# Patient Record
Sex: MALE | Race: WHITE | NOT HISPANIC OR LATINO | Employment: OTHER | URBAN - METROPOLITAN AREA
[De-identification: names, ages, dates, MRNs, and addresses within clinical notes are randomized per-mention and may not be internally consistent; named-entity substitution may affect disease eponyms.]

---

## 2017-10-10 ENCOUNTER — HOSPITAL ENCOUNTER (EMERGENCY)
Facility: HOSPITAL | Age: 77
Discharge: HOME | End: 2017-10-11
Attending: EMERGENCY MEDICINE | Admitting: NURSE PRACTITIONER

## 2017-10-10 DIAGNOSIS — R53.1 SPELL OF GENERALIZED WEAKNESS: Primary | ICD-10-CM

## 2017-10-10 PROCEDURE — 96360 HYDRATION IV INFUSION INIT: CPT

## 2017-10-10 PROCEDURE — 99283 EMERGENCY DEPT VISIT LOW MDM: CPT

## 2017-10-10 PROCEDURE — 85025 COMPLETE CBC W/AUTO DIFF WBC: CPT | Performed by: NURSE PRACTITIONER

## 2017-10-10 PROCEDURE — 80053 COMPREHEN METABOLIC PANEL: CPT | Performed by: NURSE PRACTITIONER

## 2017-10-10 RX ORDER — SODIUM CHLORIDE 0.9 % (FLUSH) 0.9 %
10 SYRINGE (ML) INJECTION AS NEEDED
Status: DISCONTINUED | OUTPATIENT
Start: 2017-10-10 | End: 2017-10-11 | Stop reason: HOSPADM

## 2017-10-10 RX ORDER — EZETIMIBE 10 MG/1
10 TABLET ORAL DAILY
COMMUNITY

## 2017-10-10 RX ORDER — ALLOPURINOL 100 MG/1
300 TABLET ORAL DAILY
COMMUNITY

## 2017-10-10 RX ORDER — ASPIRIN 81 MG/1
81 TABLET ORAL DAILY
Status: ON HOLD | COMMUNITY
End: 2021-01-01 | Stop reason: ALTCHOICE

## 2017-10-10 RX ORDER — TRIAMCINOLONE ACETONIDE 55 UG/1
2 SPRAY, METERED NASAL DAILY
Status: ON HOLD | COMMUNITY
End: 2021-01-01

## 2017-10-10 RX ADMIN — SODIUM CHLORIDE 1000 ML: 9 INJECTION, SOLUTION INTRAVENOUS at 23:56

## 2017-10-11 VITALS
HEIGHT: 68 IN | HEART RATE: 89 BPM | TEMPERATURE: 98.1 F | OXYGEN SATURATION: 93 % | DIASTOLIC BLOOD PRESSURE: 70 MMHG | WEIGHT: 151 LBS | RESPIRATION RATE: 16 BRPM | SYSTOLIC BLOOD PRESSURE: 162 MMHG | BODY MASS INDEX: 22.88 KG/M2

## 2017-10-11 LAB
ALBUMIN SERPL-MCNC: 4.1 G/DL (ref 3.5–5.2)
ALBUMIN/GLOB SERPL: 1.5 G/DL
ALP SERPL-CCNC: 129 U/L (ref 39–117)
ALT SERPL W P-5'-P-CCNC: 20 U/L (ref 1–41)
ANION GAP SERPL CALCULATED.3IONS-SCNC: 19.6 MMOL/L
AST SERPL-CCNC: 25 U/L (ref 1–40)
BASOPHILS # BLD AUTO: 0.02 10*3/MM3 (ref 0–0.2)
BASOPHILS NFR BLD AUTO: 0.2 % (ref 0–1.5)
BILIRUB SERPL-MCNC: 0.3 MG/DL (ref 0.1–1.2)
BUN BLD-MCNC: 24 MG/DL (ref 8–23)
BUN/CREAT SERPL: 20.5 (ref 7–25)
CALCIUM SPEC-SCNC: 9.6 MG/DL (ref 8.6–10.5)
CHLORIDE SERPL-SCNC: 100 MMOL/L (ref 98–107)
CO2 SERPL-SCNC: 20.4 MMOL/L (ref 22–29)
CREAT BLD-MCNC: 1.17 MG/DL (ref 0.76–1.27)
DEPRECATED RDW RBC AUTO: 50.7 FL (ref 37–54)
EOSINOPHIL # BLD AUTO: 0.23 10*3/MM3 (ref 0–0.7)
EOSINOPHIL NFR BLD AUTO: 2.4 % (ref 0.3–6.2)
ERYTHROCYTE [DISTWIDTH] IN BLOOD BY AUTOMATED COUNT: 14 % (ref 11.5–14.5)
GFR SERPL CREATININE-BSD FRML MDRD: 61 ML/MIN/1.73
GLOBULIN UR ELPH-MCNC: 2.8 GM/DL
GLUCOSE BLD-MCNC: 213 MG/DL (ref 65–99)
HCT VFR BLD AUTO: 45.6 % (ref 40.4–52.2)
HGB BLD-MCNC: 15.4 G/DL (ref 13.7–17.6)
IMM GRANULOCYTES # BLD: 0.04 10*3/MM3 (ref 0–0.03)
IMM GRANULOCYTES NFR BLD: 0.4 % (ref 0–0.5)
LYMPHOCYTES # BLD AUTO: 2.31 10*3/MM3 (ref 0.9–4.8)
LYMPHOCYTES NFR BLD AUTO: 23.9 % (ref 19.6–45.3)
MCH RBC QN AUTO: 33.8 PG (ref 27–32.7)
MCHC RBC AUTO-ENTMCNC: 33.8 G/DL (ref 32.6–36.4)
MCV RBC AUTO: 100 FL (ref 79.8–96.2)
MONOCYTES # BLD AUTO: 0.72 10*3/MM3 (ref 0.2–1.2)
MONOCYTES NFR BLD AUTO: 7.4 % (ref 5–12)
NEUTROPHILS # BLD AUTO: 6.36 10*3/MM3 (ref 1.9–8.1)
NEUTROPHILS NFR BLD AUTO: 65.7 % (ref 42.7–76)
PLATELET # BLD AUTO: 207 10*3/MM3 (ref 140–500)
PMV BLD AUTO: 10.1 FL (ref 6–12)
POTASSIUM BLD-SCNC: 4.2 MMOL/L (ref 3.5–5.2)
PROT SERPL-MCNC: 6.9 G/DL (ref 6–8.5)
RBC # BLD AUTO: 4.56 10*6/MM3 (ref 4.6–6)
SODIUM BLD-SCNC: 140 MMOL/L (ref 136–145)
WBC NRBC COR # BLD: 9.68 10*3/MM3 (ref 4.5–10.7)

## 2017-10-11 PROCEDURE — 96361 HYDRATE IV INFUSION ADD-ON: CPT

## 2017-10-11 NOTE — DISCHARGE INSTRUCTIONS
Continue current home medications  Increase fluids  Change positions slowly  Follow up with pmd in 3-5 days for recheck  Return to er for fever, chills, vomiting, headache, dizziness, weakness or any new or worsening symptoms

## 2017-10-11 NOTE — ED PROVIDER NOTES
Discussed pt's case pita Bridges.  Pt presents to ER c/o of bilateral leg weakness that occurred after Pt consumed 4 double bourbons. Pt states he went to use the restroom when his legs went out from him. Pt denies LOC or any injuries obtained from this episode of leg weakness. Pt also denies CP,SOB, or any other pertinent symptoms. Pt states he feels improved upon evaluation. Pt states he only has mild weakness in the legs currently. Plan to administer IVF and perform labs for further evaluation. On exam, Pt is resting comfortably, in no distress, and without focal neurologic deficit. Abdomen is soft and non-tender, cardio-vascular normal with no murmur, and moves all extremities appropriately.     I supervised care provided by the midlevel provider.    We have discussed this patient's history, physical exam, and treatment plan.   I have reviewed the note and personally saw and examined the patient and agree with the plan of care.    Documentation assistance provided by nicolasa Sharma for Dr. Yoder.  Information recorded by the scribe was done at my direction and has been verified and validated by me.       Rafiq Sharma  10/11/17 0013       George Yoder MD  10/14/17 9063

## 2017-10-11 NOTE — ED NOTES
Patient was drinking tonight (4 glasses bourbon), states he was going to the bathroom and felt like his legs were very weak and then fell back down onto the toilet.      Jocy Magaña RN  10/10/17 8873

## 2017-10-11 NOTE — ED PROVIDER NOTES
EMERGENCY DEPARTMENT ENCOUNTER    CHIEF COMPLAINT  Chief Complaint: Leg weakness   History given by:patient   History limited by:n/a  Room Number: 15/15  PMD: Mal Barboza MD      HPI:  Pt is a 77 y.o. male who presents with BLE weakness. Pt states that he was celebrating his birthday tonight and had about 4 double shots of bourbon. Pt states that he went to the bathroom, and then felt that he had no strength in his BLE, and collapsed onto the toilet. Pt denies LOC. Pt states that he was able to pull himself into a standing position and make it up 1 flight of stairs before he fell unable to go any farther. He denies chest pain, SOA, headache, numbness, or any other sx. Currently, pt states that the weakness in the BLE has improved. Past Medical History of diabetes, hypertension, and hyperlipidemia.     Duration: prior to arrival   Timing:constant   Location:BLE   Radiation:none  Quality:weakness   Intensity/Severity:moderate  Progression:improved   Associated Symptoms:none  Aggravating Factors:none  Alleviating Factors:none  Previous Episodes:none    PAST MEDICAL HISTORY  Active Ambulatory Problems     Diagnosis Date Noted   • No Active Ambulatory Problems     Resolved Ambulatory Problems     Diagnosis Date Noted   • No Resolved Ambulatory Problems     Past Medical History:   Diagnosis Date   • Diabetes mellitus    • Gout    • Hyperlipidemia    • Hypertension        PAST SURGICAL HISTORY  History reviewed. No pertinent surgical history.    FAMILY HISTORY  History reviewed. No pertinent family history.    SOCIAL HISTORY  Social History     Social History   • Marital status:      Spouse name: N/A   • Number of children: N/A   • Years of education: N/A     Occupational History   • Not on file.     Social History Main Topics   • Smoking status: Former Smoker   • Smokeless tobacco: Not on file   • Alcohol use Yes   • Drug use: Not on file   • Sexual activity: Not on file     Other Topics Concern   • Not on  file     Social History Narrative   • No narrative on file         ALLERGIES  Review of patient's allergies indicates no known allergies.    REVIEW OF SYSTEMS  Review of Systems   Constitutional: Negative for chills and fever.   HENT: Negative for sore throat.    Respiratory: Negative for shortness of breath.    Cardiovascular: Negative for chest pain.   Gastrointestinal: Negative for nausea and vomiting.   Genitourinary: Negative for dysuria.   Musculoskeletal: Negative for back pain.   Skin: Negative for rash.   Neurological: Positive for weakness (BLE). Negative for dizziness.   Psychiatric/Behavioral: The patient is not nervous/anxious.        PHYSICAL EXAM  ED Triage Vitals   Temp Heart Rate Resp BP SpO2   10/10/17 2322 10/10/17 2316 10/10/17 2316 10/10/17 2316 10/10/17 2316   97.5 °F (36.4 °C) 78 18 161/84 96 %     Physical Exam   Constitutional: He is oriented to person, place, and time and well-developed, well-nourished, and in no distress. No distress.   HENT:   Head: Atraumatic.   Mouth/Throat: Mucous membranes are normal.   Eyes: Conjunctivae and EOM are normal. Pupils are equal, round, and reactive to light. No scleral icterus.   Neck: Normal range of motion.   Cardiovascular: Normal rate, regular rhythm and normal heart sounds.    Pulmonary/Chest: Effort normal and breath sounds normal.   Musculoskeletal: Normal range of motion.   Neurological: He is alert and oriented to person, place, and time. He has normal motor skills and normal strength. He has a normal Finger-Nose-Finger Test.   Skin: Skin is warm and dry.   Psychiatric: Mood and affect normal.   Nursing note and vitals reviewed.      LAB RESULTS  Recent Results (from the past 24 hour(s))   Comprehensive Metabolic Panel    Collection Time: 10/10/17 11:56 PM   Result Value Ref Range    Glucose 213 (H) 65 - 99 mg/dL    BUN 24 (H) 8 - 23 mg/dL    Creatinine 1.17 0.76 - 1.27 mg/dL    Sodium 140 136 - 145 mmol/L    Potassium 4.2 3.5 - 5.2 mmol/L     Chloride 100 98 - 107 mmol/L    CO2 20.4 (L) 22.0 - 29.0 mmol/L    Calcium 9.6 8.6 - 10.5 mg/dL    Total Protein 6.9 6.0 - 8.5 g/dL    Albumin 4.10 3.50 - 5.20 g/dL    ALT (SGPT) 20 1 - 41 U/L    AST (SGOT) 25 1 - 40 U/L    Alkaline Phosphatase 129 (H) 39 - 117 U/L    Total Bilirubin 0.3 0.1 - 1.2 mg/dL    eGFR Non African Amer 61 >60 mL/min/1.73    Globulin 2.8 gm/dL    A/G Ratio 1.5 g/dL    BUN/Creatinine Ratio 20.5 7.0 - 25.0    Anion Gap 19.6 mmol/L   CBC Auto Differential    Collection Time: 10/10/17 11:56 PM   Result Value Ref Range    WBC 9.68 4.50 - 10.70 10*3/mm3    RBC 4.56 (L) 4.60 - 6.00 10*6/mm3    Hemoglobin 15.4 13.7 - 17.6 g/dL    Hematocrit 45.6 40.4 - 52.2 %    .0 (H) 79.8 - 96.2 fL    MCH 33.8 (H) 27.0 - 32.7 pg    MCHC 33.8 32.6 - 36.4 g/dL    RDW 14.0 11.5 - 14.5 %    RDW-SD 50.7 37.0 - 54.0 fl    MPV 10.1 6.0 - 12.0 fL    Platelets 207 140 - 500 10*3/mm3    Neutrophil % 65.7 42.7 - 76.0 %    Lymphocyte % 23.9 19.6 - 45.3 %    Monocyte % 7.4 5.0 - 12.0 %    Eosinophil % 2.4 0.3 - 6.2 %    Basophil % 0.2 0.0 - 1.5 %    Immature Grans % 0.4 0.0 - 0.5 %    Neutrophils, Absolute 6.36 1.90 - 8.10 10*3/mm3    Lymphocytes, Absolute 2.31 0.90 - 4.80 10*3/mm3    Monocytes, Absolute 0.72 0.20 - 1.20 10*3/mm3    Eosinophils, Absolute 0.23 0.00 - 0.70 10*3/mm3    Basophils, Absolute 0.02 0.00 - 0.20 10*3/mm3    Immature Grans, Absolute 0.04 (H) 0.00 - 0.03 10*3/mm3       I ordered the above labs and reviewed the results    PROGRESS AND CONSULTS    23:46  Pt able to stand and ambulate, though does still complain of weakness. Plan for labs and IVF. Pt understands and agrees with the plan, all questions answered.    23:50  Reviewed pt's history and workup with Dr. Yoder.  At bedside evaluation, they agree with the plan of care.    01:50  BP- 161/84 HR- 78 Temp- 97.5 °F (36.4 °C) (Tympanic) O2 sat- 96%  Rechecked the patient who is in NAD and is resting comfortably. Advised pt and family that the labs  "show NAD. Sx may be attributed to EtOH use. Pt able to stand and ambulate at bedside. Pt will be discharged. Pt understands and agrees with the plan, all questions answered.    01:53  Reviewed implications of results, diagnosis, meds, responsibility to follow up, warning signs and symptoms of possible worsening, potential complications and reasons to return to ER with patient.  Discussed all results and noted any abnormalities with patient.  Discussed absolute need to recheck abnormalities with PMD    Discussed plan for discharge, as there is no emergent indication for admission.  Pt is agreeable and understands need for follow up and repeat testing.  Pt is aware that discharge does not mean that nothing is wrong but it indicates no emergency is present.  Pt is discharged with instructions to follow up with primary care doctor to have their blood pressure rechecked.       DIAGNOSIS  Final diagnoses:   Spell of generalized weakness       FOLLOW UP   Mal Barboza MD  [address] [address]  [phone]            OURSE & MEDICAL DECISION MAKING  Pertinent Labs that were ordered and reviewed are noted above.  Results were reviewed/discussed with the patient and they were also made aware of online assess.     MEDICATIONS GIVEN IN ER  Medications   sodium chloride 0.9 % flush 10 mL (not administered)   sodium chloride 0.9 % bolus 1,000 mL (1,000 mL Intravenous New Bag 10/10/17 2356)       /84  Pulse 78  Temp 97.5 °F (36.4 °C) (Tympanic)   Resp 18  Ht 68\" (172.7 cm)  Wt 151 lb (68.5 kg)  SpO2 96%  BMI 22.96 kg/m2      I personally reviewed the past medical history, past surgical history, social history, family history, current medications and allergies as they appear in this chart.  The scribe's note accurately reflects the work and decisions made by me.     Documentation assistance provided by nicolasa Sigala for VIKKI Peralta on 10/10/2017 at 11:39 PM. " Information recorded by the scribe was done at my direction and has been verified and validated by me.        Jessika Sigala  10/11/17 0154       Kaylyn Eden, APRN  10/12/17 [id number]

## 2017-10-13 ENCOUNTER — TRANSCRIBE ORDERS (OUTPATIENT)
Dept: ADMINISTRATIVE | Facility: HOSPITAL | Age: 77
End: 2017-10-13

## 2017-10-13 DIAGNOSIS — I25.10 ASCVD (ARTERIOSCLEROTIC CARDIOVASCULAR DISEASE): ICD-10-CM

## 2017-10-13 DIAGNOSIS — R55 SYNCOPE, UNSPECIFIED SYNCOPE TYPE: Primary | ICD-10-CM

## 2017-10-18 ENCOUNTER — HOSPITAL ENCOUNTER (OUTPATIENT)
Dept: CARDIOLOGY | Facility: HOSPITAL | Age: 77
Discharge: HOME | End: 2017-10-18
Attending: INTERNAL MEDICINE | Admitting: INTERNAL MEDICINE

## 2017-10-18 DIAGNOSIS — I25.10 ASCVD (ARTERIOSCLEROTIC CARDIOVASCULAR DISEASE): ICD-10-CM

## 2017-10-18 DIAGNOSIS — R55 SYNCOPE, UNSPECIFIED SYNCOPE TYPE: ICD-10-CM

## 2017-10-18 LAB
BH CV NUCLEAR PRIOR STUDY: 2
BH CV STRESS BP STAGE 1: NORMAL
BH CV STRESS BP STAGE 2: NORMAL
BH CV STRESS DURATION MIN STAGE 1: 3
BH CV STRESS DURATION MIN STAGE 2: 4
BH CV STRESS DURATION SEC STAGE 1: 0
BH CV STRESS DURATION SEC STAGE 2: 0
BH CV STRESS GRADE STAGE 1: 10
BH CV STRESS GRADE STAGE 2: 12
BH CV STRESS HR STAGE 1: 101
BH CV STRESS HR STAGE 2: 130
BH CV STRESS METS STAGE 1: 5
BH CV STRESS METS STAGE 2: 7.5
BH CV STRESS PROTOCOL 1: NORMAL
BH CV STRESS RECOVERY BP: NORMAL MMHG
BH CV STRESS RECOVERY HR: 84 BPM
BH CV STRESS SPEED STAGE 1: 1.7
BH CV STRESS SPEED STAGE 2: 2.5
BH CV STRESS STAGE 1: 1
BH CV STRESS STAGE 2: 2
LV EF NUC BP: 56 %
MAXIMAL PREDICTED HEART RATE: 145 BPM
PERCENT MAX PREDICTED HR: 89.66 %
STRESS BASELINE BP: NORMAL MMHG
STRESS BASELINE HR: 69 BPM
STRESS PERCENT HR: 105 %
STRESS POST ESTIMATED WORKLOAD: 7.5 METS
STRESS POST EXERCISE DUR MIN: 7 MIN
STRESS POST EXERCISE DUR SEC: 0 SEC
STRESS POST PEAK BP: NORMAL MMHG
STRESS POST PEAK HR: 130 BPM
STRESS TARGET HR: 123 BPM

## 2017-10-18 PROCEDURE — 0 TECHNETIUM TETROFOSMIN KIT: Performed by: INTERNAL MEDICINE

## 2017-10-18 PROCEDURE — 93018 CV STRESS TEST I&R ONLY: CPT | Performed by: INTERNAL MEDICINE

## 2017-10-18 PROCEDURE — 78452 HT MUSCLE IMAGE SPECT MULT: CPT

## 2017-10-18 PROCEDURE — 93016 CV STRESS TEST SUPVJ ONLY: CPT | Performed by: INTERNAL MEDICINE

## 2017-10-18 PROCEDURE — 93017 CV STRESS TEST TRACING ONLY: CPT

## 2017-10-18 PROCEDURE — 78452 HT MUSCLE IMAGE SPECT MULT: CPT | Performed by: INTERNAL MEDICINE

## 2017-10-18 PROCEDURE — A9502 TC99M TETROFOSMIN: HCPCS | Performed by: INTERNAL MEDICINE

## 2017-10-18 RX ADMIN — TETROFOSMIN 1 DOSE: 1.38 INJECTION, POWDER, LYOPHILIZED, FOR SOLUTION INTRAVENOUS at 08:04

## 2017-10-18 RX ADMIN — TETROFOSMIN 1 DOSE: 1.38 INJECTION, POWDER, LYOPHILIZED, FOR SOLUTION INTRAVENOUS at 09:03

## 2017-11-22 ENCOUNTER — TRANSCRIBE ORDERS (OUTPATIENT)
Dept: DIABETES SERVICES | Facility: HOSPITAL | Age: 77
End: 2017-11-22

## 2017-11-22 DIAGNOSIS — E11.9 DIABETES MELLITUS WITHOUT COMPLICATION (HCC): Primary | ICD-10-CM

## 2017-12-05 ENCOUNTER — HOSPITAL ENCOUNTER (OUTPATIENT)
Dept: DIABETES SERVICES | Facility: HOSPITAL | Age: 77
Setting detail: RECURRING SERIES
Discharge: HOME | End: 2017-12-05
Attending: INTERNAL MEDICINE

## 2017-12-05 PROCEDURE — G0109 DIAB MANAGE TRN IND/GROUP: HCPCS

## 2017-12-12 ENCOUNTER — HOSPITAL ENCOUNTER (OUTPATIENT)
Dept: DIABETES SERVICES | Facility: HOSPITAL | Age: 77
Setting detail: RECURRING SERIES
Discharge: HOME | End: 2017-12-12
Attending: INTERNAL MEDICINE

## 2017-12-12 PROCEDURE — G0109 DIAB MANAGE TRN IND/GROUP: HCPCS

## 2017-12-19 ENCOUNTER — HOSPITAL ENCOUNTER (OUTPATIENT)
Dept: DIABETES SERVICES | Facility: HOSPITAL | Age: 77
Setting detail: RECURRING SERIES
Discharge: HOME | End: 2017-12-19
Attending: INTERNAL MEDICINE

## 2017-12-19 PROCEDURE — G0109 DIAB MANAGE TRN IND/GROUP: HCPCS

## 2018-11-28 ENCOUNTER — TRANSCRIBE ORDERS (OUTPATIENT)
Dept: ADMINISTRATIVE | Facility: HOSPITAL | Age: 78
End: 2018-11-28

## 2018-11-28 DIAGNOSIS — I38 VALVULAR DISEASE: Primary | ICD-10-CM

## 2018-11-30 ENCOUNTER — HOSPITAL ENCOUNTER (OUTPATIENT)
Dept: CARDIOLOGY | Facility: HOSPITAL | Age: 78
Discharge: HOME | End: 2018-11-30
Attending: INTERNAL MEDICINE | Admitting: INTERNAL MEDICINE

## 2018-11-30 VITALS
HEIGHT: 68 IN | WEIGHT: 151 LBS | DIASTOLIC BLOOD PRESSURE: 47 MMHG | BODY MASS INDEX: 22.88 KG/M2 | SYSTOLIC BLOOD PRESSURE: 160 MMHG | HEART RATE: 71 BPM

## 2018-11-30 DIAGNOSIS — I38 VALVULAR DISEASE: ICD-10-CM

## 2018-11-30 LAB
AORTIC DIMENSIONLESS INDEX: 0.6 (DI)
BH CV ECHO MEAS - ACS: 2 CM
BH CV ECHO MEAS - AO MAX PG: 7 MMHG
BH CV ECHO MEAS - AO MEAN PG (FULL): 3 MMHG
BH CV ECHO MEAS - AO MEAN PG: 4 MMHG
BH CV ECHO MEAS - AO ROOT AREA (BSA CORRECTED): 1.8
BH CV ECHO MEAS - AO ROOT AREA: 8.6 CM^2
BH CV ECHO MEAS - AO ROOT DIAM: 3.3 CM
BH CV ECHO MEAS - AO V2 MAX: 134 CM/SEC
BH CV ECHO MEAS - AO V2 MEAN: 88.7 CM/SEC
BH CV ECHO MEAS - AO V2 VTI: 25.8 CM
BH CV ECHO MEAS - AVA(I,A): 2.1 CM^2
BH CV ECHO MEAS - AVA(I,D): 2.1 CM^2
BH CV ECHO MEAS - BSA(HAYCOCK): 1.8 M^2
BH CV ECHO MEAS - BSA: 1.8 M^2
BH CV ECHO MEAS - BZI_BMI: 23 KILOGRAMS/M^2
BH CV ECHO MEAS - BZI_METRIC_HEIGHT: 172.7 CM
BH CV ECHO MEAS - BZI_METRIC_WEIGHT: 68.5 KG
BH CV ECHO MEAS - EDV(CUBED): 97.3 ML
BH CV ECHO MEAS - EDV(MOD-SP2): 64 ML
BH CV ECHO MEAS - EDV(MOD-SP4): 55 ML
BH CV ECHO MEAS - EDV(TEICH): 97.3 ML
BH CV ECHO MEAS - EF(CUBED): 69.4 %
BH CV ECHO MEAS - EF(MOD-BP): 59 %
BH CV ECHO MEAS - EF(MOD-SP2): 53.1 %
BH CV ECHO MEAS - EF(MOD-SP4): 63.6 %
BH CV ECHO MEAS - EF(TEICH): 61 %
BH CV ECHO MEAS - ESV(CUBED): 29.8 ML
BH CV ECHO MEAS - ESV(MOD-SP2): 30 ML
BH CV ECHO MEAS - ESV(MOD-SP4): 20 ML
BH CV ECHO MEAS - ESV(TEICH): 37.9 ML
BH CV ECHO MEAS - FS: 32.6 %
BH CV ECHO MEAS - IVS/LVPW: 0.82
BH CV ECHO MEAS - IVSD: 0.9 CM
BH CV ECHO MEAS - LA DIMENSION: 4.4 CM
BH CV ECHO MEAS - LA/AO: 1.3
BH CV ECHO MEAS - LAT PEAK E' VEL: 8 CM/SEC
BH CV ECHO MEAS - LV DIASTOLIC VOL/BSA (35-75): 30.3 ML/M^2
BH CV ECHO MEAS - LV MASS(C)D: 158.8 GRAMS
BH CV ECHO MEAS - LV MASS(C)DI: 87.6 GRAMS/M^2
BH CV ECHO MEAS - LV MEAN PG: 1 MMHG
BH CV ECHO MEAS - LV SYSTOLIC VOL/BSA (12-30): 11 ML/M^2
BH CV ECHO MEAS - LV V1 MAX: 87 CM/SEC
BH CV ECHO MEAS - LV V1 MEAN: 57.2 CM/SEC
BH CV ECHO MEAS - LV V1 VTI: 15.9 CM
BH CV ECHO MEAS - LVIDD: 4.6 CM
BH CV ECHO MEAS - LVIDS: 3.1 CM
BH CV ECHO MEAS - LVLD AP2: 6.7 CM
BH CV ECHO MEAS - LVLD AP4: 7 CM
BH CV ECHO MEAS - LVLS AP2: 5.7 CM
BH CV ECHO MEAS - LVLS AP4: 5.8 CM
BH CV ECHO MEAS - LVOT AREA (M): 3.5 CM^2
BH CV ECHO MEAS - LVOT AREA: 3.5 CM^2
BH CV ECHO MEAS - LVOT DIAM: 2.1 CM
BH CV ECHO MEAS - LVPWD: 1.1 CM
BH CV ECHO MEAS - MED PEAK E' VEL: 10 CM/SEC
BH CV ECHO MEAS - MR MAX PG: 70.9 MMHG
BH CV ECHO MEAS - MR MAX VEL: 421 CM/SEC
BH CV ECHO MEAS - MV A DUR: 0.07 SEC
BH CV ECHO MEAS - MV A MAX VEL: 53.3 CM/SEC
BH CV ECHO MEAS - MV DEC SLOPE: 308 CM/SEC^2
BH CV ECHO MEAS - MV DEC TIME: 0.14 SEC
BH CV ECHO MEAS - MV E MAX VEL: 82.4 CM/SEC
BH CV ECHO MEAS - MV E/A: 1.5
BH CV ECHO MEAS - MV MEAN PG: 1 MMHG
BH CV ECHO MEAS - MV P1/2T MAX VEL: 82.5 CM/SEC
BH CV ECHO MEAS - MV P1/2T: 78.5 MSEC
BH CV ECHO MEAS - MV V2 MEAN: 45.6 CM/SEC
BH CV ECHO MEAS - MV V2 VTI: 25.9 CM
BH CV ECHO MEAS - MVA P1/2T LCG: 2.7 CM^2
BH CV ECHO MEAS - MVA(P1/2T): 2.8 CM^2
BH CV ECHO MEAS - MVA(VTI): 2.1 CM^2
BH CV ECHO MEAS - PA ACC SLOPE: 2258 CM/SEC^2
BH CV ECHO MEAS - PA ACC TIME: 0.06 SEC
BH CV ECHO MEAS - PA MAX PG (FULL): 3.7 MMHG
BH CV ECHO MEAS - PA MAX PG: 6.4 MMHG
BH CV ECHO MEAS - PA PR(ACCEL): 53.8 MMHG
BH CV ECHO MEAS - PA V2 MAX: 126 CM/SEC
BH CV ECHO MEAS - PULM A REVS DUR: 0.1 SEC
BH CV ECHO MEAS - PULM A REVS VEL: 26.8 CM/SEC
BH CV ECHO MEAS - PULM DIAS VEL: 62.4 CM/SEC
BH CV ECHO MEAS - PULM S/D: 1.1
BH CV ECHO MEAS - PULM SYS VEL: 66.2 CM/SEC
BH CV ECHO MEAS - PVA(V,A): 2.9 CM^2
BH CV ECHO MEAS - PVA(V,D): 2.9 CM^2
BH CV ECHO MEAS - QP/QS: 1.6
BH CV ECHO MEAS - RAP SYSTOLE: 8 MMHG
BH CV ECHO MEAS - RV MAX PG: 2.7 MMHG
BH CV ECHO MEAS - RV MEAN PG: 2 MMHG
BH CV ECHO MEAS - RV V1 MAX: 82 CM/SEC
BH CV ECHO MEAS - RV V1 MEAN: 58.3 CM/SEC
BH CV ECHO MEAS - RV V1 VTI: 18.9 CM
BH CV ECHO MEAS - RVOT AREA: 4.5 CM^2
BH CV ECHO MEAS - RVOT DIAM: 2.4 CM
BH CV ECHO MEAS - RVSP: 36 MMHG
BH CV ECHO MEAS - SI(AO): 121.7 ML/M^2
BH CV ECHO MEAS - SI(CUBED): 37.2 ML/M^2
BH CV ECHO MEAS - SI(LVOT): 30.4 ML/M^2
BH CV ECHO MEAS - SI(MOD-SP2): 18.7 ML/M^2
BH CV ECHO MEAS - SI(MOD-SP4): 19.3 ML/M^2
BH CV ECHO MEAS - SI(TEICH): 32.8 ML/M^2
BH CV ECHO MEAS - SV(AO): 220.7 ML
BH CV ECHO MEAS - SV(CUBED): 67.5 ML
BH CV ECHO MEAS - SV(LVOT): 55.1 ML
BH CV ECHO MEAS - SV(MOD-SP2): 34 ML
BH CV ECHO MEAS - SV(MOD-SP4): 35 ML
BH CV ECHO MEAS - SV(RVOT): 85.5 ML
BH CV ECHO MEAS - SV(TEICH): 59.4 ML
BH CV ECHO MEAS - TAPSE (>1.6): 2.5 CM2
BH CV ECHO MEAS - TR MAX VEL: 265 CM/SEC
BH CV ECHO MEASUREMENTS AVERAGE E/E' RATIO: 9.16
BH CV VAS BP LEFT ARM: NORMAL MMHG
BH CV XLRA - RV BASE: 4.2 CM
BH CV XLRA - TDI S': 17.2 CM/SEC
LEFT ATRIUM VOLUME INDEX: 48.1 ML/M2
LV EF 2D ECHO EST: 59 %
MAXIMAL PREDICTED HEART RATE: 144 BPM
STRESS TARGET HR: 122 BPM

## 2018-11-30 PROCEDURE — 25010000002 PERFLUTREN (DEFINITY) 8.476 MG IN SODIUM CHLORIDE 0.9 % 10 ML INJECTION: Performed by: INTERNAL MEDICINE

## 2018-11-30 PROCEDURE — 93306 TTE W/DOPPLER COMPLETE: CPT | Performed by: INTERNAL MEDICINE

## 2018-11-30 PROCEDURE — 93306 TTE W/DOPPLER COMPLETE: CPT

## 2018-11-30 RX ADMIN — SODIUM CHLORIDE 3 ML: 9 INJECTION INTRAMUSCULAR; INTRAVENOUS; SUBCUTANEOUS at 11:10

## 2019-06-26 ENCOUNTER — HOSPITAL ENCOUNTER (OUTPATIENT)
Dept: GENERAL RADIOLOGY | Facility: HOSPITAL | Age: 79
Discharge: HOME | End: 2019-06-26
Admitting: INTERNAL MEDICINE

## 2019-06-26 DIAGNOSIS — R06.02 SHORTNESS OF BREATH: ICD-10-CM

## 2019-06-26 PROCEDURE — 71046 X-RAY EXAM CHEST 2 VIEWS: CPT

## 2019-11-08 ENCOUNTER — APPOINTMENT (OUTPATIENT)
Dept: GENERAL RADIOLOGY | Facility: HOSPITAL | Age: 79
End: 2019-11-08

## 2019-11-08 ENCOUNTER — HOSPITAL ENCOUNTER (EMERGENCY)
Facility: HOSPITAL | Age: 79
Discharge: HOME | End: 2019-11-08
Attending: EMERGENCY MEDICINE | Admitting: EMERGENCY MEDICINE

## 2019-11-08 VITALS
SYSTOLIC BLOOD PRESSURE: 175 MMHG | DIASTOLIC BLOOD PRESSURE: 78 MMHG | TEMPERATURE: 98.4 F | OXYGEN SATURATION: 98 % | HEIGHT: 68 IN | BODY MASS INDEX: 24.25 KG/M2 | RESPIRATION RATE: 16 BRPM | WEIGHT: 160 LBS | HEART RATE: 88 BPM

## 2019-11-08 DIAGNOSIS — S82.831A OTHER CLOSED FRACTURE OF DISTAL END OF RIGHT FIBULA, INITIAL ENCOUNTER: Primary | ICD-10-CM

## 2019-11-08 PROCEDURE — 73610 X-RAY EXAM OF ANKLE: CPT

## 2019-11-08 PROCEDURE — 73630 X-RAY EXAM OF FOOT: CPT

## 2019-11-08 PROCEDURE — 99283 EMERGENCY DEPT VISIT LOW MDM: CPT

## 2019-11-08 RX ORDER — HYDROCODONE BITARTRATE AND ACETAMINOPHEN 5; 325 MG/1; MG/1
1 TABLET ORAL EVERY 6 HOURS PRN
Qty: 20 TABLET | Refills: 0 | Status: SHIPPED | OUTPATIENT
Start: 2019-11-08 | End: 2019-11-19

## 2019-11-08 RX ORDER — PRAVASTATIN SODIUM 40 MG
40 TABLET ORAL DAILY
COMMUNITY

## 2019-11-08 RX ORDER — MONTELUKAST SODIUM 10 MG/1
10 TABLET ORAL NIGHTLY
COMMUNITY

## 2019-11-08 RX ORDER — PIOGLITAZONEHYDROCHLORIDE 15 MG/1
15 TABLET ORAL DAILY
COMMUNITY

## 2019-11-08 NOTE — ED TRIAGE NOTES
Patient presents to er via private vehicle from home. Patient reports a mechanical fall last night injuring his right foot.

## 2019-11-12 ENCOUNTER — ANESTHESIA EVENT (OUTPATIENT)
Dept: PERIOP | Facility: HOSPITAL | Age: 79
End: 2019-11-12

## 2019-11-12 ENCOUNTER — APPOINTMENT (OUTPATIENT)
Dept: GENERAL RADIOLOGY | Facility: HOSPITAL | Age: 79
End: 2019-11-12

## 2019-11-12 ENCOUNTER — HOSPITAL ENCOUNTER (INPATIENT)
Facility: HOSPITAL | Age: 79
LOS: 3 days | Discharge: NURSING, REHAB OR LONG-TERM CARE | End: 2019-11-15
Attending: ORTHOPAEDIC SURGERY | Admitting: ORTHOPAEDIC SURGERY

## 2019-11-12 ENCOUNTER — ANESTHESIA (OUTPATIENT)
Dept: PERIOP | Facility: HOSPITAL | Age: 79
End: 2019-11-12

## 2019-11-12 PROBLEM — S82.891A CLOSED RIGHT ANKLE FRACTURE: Status: ACTIVE | Noted: 2019-11-12

## 2019-11-12 LAB
ABO GROUP BLD: NORMAL
BLD GP AB SCN SERPL QL: NEGATIVE
GLUCOSE BLDC GLUCOMTR-MCNC: 144 MG/DL (ref 70–130)
GLUCOSE BLDC GLUCOMTR-MCNC: 146 MG/DL (ref 70–130)
GLUCOSE BLDC GLUCOMTR-MCNC: 252 MG/DL (ref 70–130)
GLUCOSE BLDC GLUCOMTR-MCNC: 258 MG/DL (ref 70–130)
RH BLD: NEGATIVE
T&S EXPIRATION DATE: NORMAL

## 2019-11-12 PROCEDURE — C1713 ANCHOR/SCREW BN/BN,TIS/BN: HCPCS | Performed by: ORTHOPAEDIC SURGERY

## 2019-11-12 PROCEDURE — 73610 X-RAY EXAM OF ANKLE: CPT

## 2019-11-12 PROCEDURE — 0QSJ04Z REPOSITION RIGHT FIBULA WITH INTERNAL FIXATION DEVICE, OPEN APPROACH: ICD-10-PCS | Performed by: ORTHOPAEDIC SURGERY

## 2019-11-12 PROCEDURE — 25010000002 KETOROLAC TROMETHAMINE PER 15 MG: Performed by: NURSE ANESTHETIST, CERTIFIED REGISTERED

## 2019-11-12 PROCEDURE — 82962 GLUCOSE BLOOD TEST: CPT

## 2019-11-12 PROCEDURE — 73600 X-RAY EXAM OF ANKLE: CPT

## 2019-11-12 PROCEDURE — 25010000003 CEFAZOLIN IN DEXTROSE 2-4 GM/100ML-% SOLUTION: Performed by: NURSE ANESTHETIST, CERTIFIED REGISTERED

## 2019-11-12 PROCEDURE — 86901 BLOOD TYPING SEROLOGIC RH(D): CPT | Performed by: ORTHOPAEDIC SURGERY

## 2019-11-12 PROCEDURE — 97162 PT EVAL MOD COMPLEX 30 MIN: CPT

## 2019-11-12 PROCEDURE — 25010000002 FENTANYL CITRATE (PF) 100 MCG/2ML SOLUTION: Performed by: NURSE ANESTHETIST, CERTIFIED REGISTERED

## 2019-11-12 PROCEDURE — 86850 RBC ANTIBODY SCREEN: CPT | Performed by: ORTHOPAEDIC SURGERY

## 2019-11-12 PROCEDURE — 76000 FLUOROSCOPY <1 HR PHYS/QHP: CPT

## 2019-11-12 PROCEDURE — 86900 BLOOD TYPING SEROLOGIC ABO: CPT | Performed by: ORTHOPAEDIC SURGERY

## 2019-11-12 PROCEDURE — 93010 ELECTROCARDIOGRAM REPORT: CPT | Performed by: INTERNAL MEDICINE

## 2019-11-12 PROCEDURE — L4386 NON-PNEUM WALK BOOT PRE CST: HCPCS | Performed by: ORTHOPAEDIC SURGERY

## 2019-11-12 PROCEDURE — 97110 THERAPEUTIC EXERCISES: CPT

## 2019-11-12 PROCEDURE — 93005 ELECTROCARDIOGRAM TRACING: CPT | Performed by: ORTHOPAEDIC SURGERY

## 2019-11-12 PROCEDURE — 25010000002 DEXAMETHASONE PER 1 MG: Performed by: NURSE ANESTHETIST, CERTIFIED REGISTERED

## 2019-11-12 PROCEDURE — 25010000002 ONDANSETRON PER 1 MG: Performed by: NURSE ANESTHETIST, CERTIFIED REGISTERED

## 2019-11-12 PROCEDURE — 25010000002 PROPOFOL 10 MG/ML EMULSION: Performed by: NURSE ANESTHETIST, CERTIFIED REGISTERED

## 2019-11-12 DEVICE — EVOS 3.5MM X 11MM CORTEX SCREW SELF-TAPPING
Type: IMPLANTABLE DEVICE | Status: FUNCTIONAL
Brand: EVOS

## 2019-11-12 DEVICE — EVOS 3.5 MM LOCKING 1/3 TUBULAR                                    PLATE 7 HOLE 82MM
Type: IMPLANTABLE DEVICE | Status: FUNCTIONAL
Brand: EVOS

## 2019-11-12 DEVICE — EVOS 3.5MM X 13MM LOCKING SCREW SELF-TAPPING
Type: IMPLANTABLE DEVICE | Status: FUNCTIONAL
Brand: EVOS

## 2019-11-12 DEVICE — EVOS 3.5MM X 16MM CORTEX SCREW SELF-TAPPING
Type: IMPLANTABLE DEVICE | Status: FUNCTIONAL
Brand: EVOS

## 2019-11-12 DEVICE — EVOS 3.5MM X 13MM CORTEX SCREW SELF-TAPPING
Type: IMPLANTABLE DEVICE | Status: FUNCTIONAL
Brand: EVOS

## 2019-11-12 DEVICE — EVOS 3.5MM X 10MM CORTEX SCREW SELF-TAPPING
Type: IMPLANTABLE DEVICE | Status: FUNCTIONAL
Brand: EVOS

## 2019-11-12 RX ORDER — MIDAZOLAM HYDROCHLORIDE 1 MG/ML
1 INJECTION INTRAMUSCULAR; INTRAVENOUS
Status: DISCONTINUED | OUTPATIENT
Start: 2019-11-12 | End: 2019-11-12 | Stop reason: HOSPADM

## 2019-11-12 RX ORDER — SODIUM CHLORIDE 0.9 % (FLUSH) 0.9 %
10 SYRINGE (ML) INJECTION AS NEEDED
Status: DISCONTINUED | OUTPATIENT
Start: 2019-11-12 | End: 2019-11-15 | Stop reason: HOSPADM

## 2019-11-12 RX ORDER — ALBUTEROL SULFATE 2.5 MG/3ML
2.5 SOLUTION RESPIRATORY (INHALATION) ONCE AS NEEDED
Status: DISCONTINUED | OUTPATIENT
Start: 2019-11-12 | End: 2019-11-12 | Stop reason: HOSPADM

## 2019-11-12 RX ORDER — HYDRALAZINE HYDROCHLORIDE 20 MG/ML
5 INJECTION INTRAMUSCULAR; INTRAVENOUS
Status: DISCONTINUED | OUTPATIENT
Start: 2019-11-12 | End: 2019-11-12 | Stop reason: HOSPADM

## 2019-11-12 RX ORDER — PRAVASTATIN SODIUM 40 MG
40 TABLET ORAL NIGHTLY
Status: DISCONTINUED | OUTPATIENT
Start: 2019-11-12 | End: 2019-11-15 | Stop reason: HOSPADM

## 2019-11-12 RX ORDER — FAMOTIDINE 10 MG/ML
20 INJECTION, SOLUTION INTRAVENOUS ONCE
Status: COMPLETED | OUTPATIENT
Start: 2019-11-12 | End: 2019-11-12

## 2019-11-12 RX ORDER — UBIDECARENONE 75 MG
250 CAPSULE ORAL DAILY
Status: ON HOLD | COMMUNITY
End: 2021-01-01

## 2019-11-12 RX ORDER — SODIUM CHLORIDE 0.9 % (FLUSH) 0.9 %
10 SYRINGE (ML) INJECTION EVERY 12 HOURS SCHEDULED
Status: DISCONTINUED | OUTPATIENT
Start: 2019-11-12 | End: 2019-11-15 | Stop reason: HOSPADM

## 2019-11-12 RX ORDER — FENTANYL CITRATE 50 UG/ML
INJECTION, SOLUTION INTRAMUSCULAR; INTRAVENOUS AS NEEDED
Status: DISCONTINUED | OUTPATIENT
Start: 2019-11-12 | End: 2019-11-12 | Stop reason: SURG

## 2019-11-12 RX ORDER — FLUMAZENIL 0.1 MG/ML
0.2 INJECTION INTRAVENOUS AS NEEDED
Status: DISCONTINUED | OUTPATIENT
Start: 2019-11-12 | End: 2019-11-12 | Stop reason: HOSPADM

## 2019-11-12 RX ORDER — DIPHENHYDRAMINE HCL 25 MG
25 CAPSULE ORAL
Status: DISCONTINUED | OUTPATIENT
Start: 2019-11-12 | End: 2019-11-12 | Stop reason: HOSPADM

## 2019-11-12 RX ORDER — PROMETHAZINE HYDROCHLORIDE 25 MG/ML
6.25 INJECTION, SOLUTION INTRAMUSCULAR; INTRAVENOUS
Status: DISCONTINUED | OUTPATIENT
Start: 2019-11-12 | End: 2019-11-12 | Stop reason: HOSPADM

## 2019-11-12 RX ORDER — FENTANYL CITRATE 50 UG/ML
50 INJECTION, SOLUTION INTRAMUSCULAR; INTRAVENOUS
Status: DISCONTINUED | OUTPATIENT
Start: 2019-11-12 | End: 2019-11-12 | Stop reason: HOSPADM

## 2019-11-12 RX ORDER — HYDROMORPHONE HYDROCHLORIDE 1 MG/ML
1 INJECTION, SOLUTION INTRAMUSCULAR; INTRAVENOUS; SUBCUTANEOUS
Status: DISCONTINUED | OUTPATIENT
Start: 2019-11-12 | End: 2019-11-15 | Stop reason: HOSPADM

## 2019-11-12 RX ORDER — DOCUSATE SODIUM 100 MG/1
100 CAPSULE, LIQUID FILLED ORAL 2 TIMES DAILY
Status: DISCONTINUED | OUTPATIENT
Start: 2019-11-12 | End: 2019-11-15 | Stop reason: HOSPADM

## 2019-11-12 RX ORDER — EPHEDRINE SULFATE 50 MG/ML
5 INJECTION, SOLUTION INTRAVENOUS ONCE AS NEEDED
Status: DISCONTINUED | OUTPATIENT
Start: 2019-11-12 | End: 2019-11-12 | Stop reason: HOSPADM

## 2019-11-12 RX ORDER — PROMETHAZINE HYDROCHLORIDE 25 MG/ML
12.5 INJECTION, SOLUTION INTRAMUSCULAR; INTRAVENOUS ONCE AS NEEDED
Status: DISCONTINUED | OUTPATIENT
Start: 2019-11-12 | End: 2019-11-12 | Stop reason: HOSPADM

## 2019-11-12 RX ORDER — BUPIVACAINE HYDROCHLORIDE 5 MG/ML
INJECTION, SOLUTION PERINEURAL AS NEEDED
Status: DISCONTINUED | OUTPATIENT
Start: 2019-11-12 | End: 2019-11-12 | Stop reason: HOSPADM

## 2019-11-12 RX ORDER — FAMOTIDINE 10 MG/ML
INJECTION, SOLUTION INTRAVENOUS
Status: DISPENSED
Start: 2019-11-12 | End: 2019-11-12

## 2019-11-12 RX ORDER — OXYCODONE AND ACETAMINOPHEN 7.5; 325 MG/1; MG/1
1 TABLET ORAL ONCE AS NEEDED
Status: DISCONTINUED | OUTPATIENT
Start: 2019-11-12 | End: 2019-11-12 | Stop reason: HOSPADM

## 2019-11-12 RX ORDER — DIPHENHYDRAMINE HYDROCHLORIDE 50 MG/ML
12.5 INJECTION INTRAMUSCULAR; INTRAVENOUS
Status: DISCONTINUED | OUTPATIENT
Start: 2019-11-12 | End: 2019-11-12 | Stop reason: HOSPADM

## 2019-11-12 RX ORDER — NALOXONE HCL 0.4 MG/ML
0.4 VIAL (ML) INJECTION
Status: DISCONTINUED | OUTPATIENT
Start: 2019-11-12 | End: 2019-11-15 | Stop reason: HOSPADM

## 2019-11-12 RX ORDER — DEXAMETHASONE SODIUM PHOSPHATE 10 MG/ML
INJECTION INTRAMUSCULAR; INTRAVENOUS AS NEEDED
Status: DISCONTINUED | OUTPATIENT
Start: 2019-11-12 | End: 2019-11-12 | Stop reason: SURG

## 2019-11-12 RX ORDER — SODIUM CHLORIDE, SODIUM LACTATE, POTASSIUM CHLORIDE, CALCIUM CHLORIDE 600; 310; 30; 20 MG/100ML; MG/100ML; MG/100ML; MG/100ML
9 INJECTION, SOLUTION INTRAVENOUS CONTINUOUS
Status: DISCONTINUED | OUTPATIENT
Start: 2019-11-12 | End: 2019-11-14

## 2019-11-12 RX ORDER — LIDOCAINE HYDROCHLORIDE 10 MG/ML
0.5 INJECTION, SOLUTION EPIDURAL; INFILTRATION; INTRACAUDAL; PERINEURAL ONCE AS NEEDED
Status: DISCONTINUED | OUTPATIENT
Start: 2019-11-12 | End: 2019-11-12 | Stop reason: HOSPADM

## 2019-11-12 RX ORDER — PROMETHAZINE HYDROCHLORIDE 25 MG/1
25 SUPPOSITORY RECTAL ONCE AS NEEDED
Status: DISCONTINUED | OUTPATIENT
Start: 2019-11-12 | End: 2019-11-12 | Stop reason: HOSPADM

## 2019-11-12 RX ORDER — ACETAMINOPHEN 325 MG/1
650 TABLET ORAL ONCE AS NEEDED
Status: DISCONTINUED | OUTPATIENT
Start: 2019-11-12 | End: 2019-11-12 | Stop reason: HOSPADM

## 2019-11-12 RX ORDER — HYDROCODONE BITARTRATE AND ACETAMINOPHEN 5; 325 MG/1; MG/1
1 TABLET ORAL EVERY 4 HOURS PRN
Status: DISCONTINUED | OUTPATIENT
Start: 2019-11-12 | End: 2019-11-15 | Stop reason: HOSPADM

## 2019-11-12 RX ORDER — HYDROCODONE BITARTRATE AND ACETAMINOPHEN 5; 325 MG/1; MG/1
1 TABLET ORAL EVERY 4 HOURS PRN
Qty: 30 TABLET | Refills: 0 | OUTPATIENT
Start: 2019-11-12 | End: 2019-11-19

## 2019-11-12 RX ORDER — SODIUM CHLORIDE 0.9 % (FLUSH) 0.9 %
3-10 SYRINGE (ML) INJECTION AS NEEDED
Status: DISCONTINUED | OUTPATIENT
Start: 2019-11-12 | End: 2019-11-12 | Stop reason: HOSPADM

## 2019-11-12 RX ORDER — FAMOTIDINE 40 MG/1
40 TABLET, FILM COATED ORAL DAILY
Status: DISCONTINUED | OUTPATIENT
Start: 2019-11-12 | End: 2019-11-13

## 2019-11-12 RX ORDER — SODIUM CHLORIDE 0.9 % (FLUSH) 0.9 %
3 SYRINGE (ML) INJECTION EVERY 12 HOURS SCHEDULED
Status: DISCONTINUED | OUTPATIENT
Start: 2019-11-12 | End: 2019-11-12 | Stop reason: HOSPADM

## 2019-11-12 RX ORDER — ONDANSETRON 4 MG/1
4 TABLET, FILM COATED ORAL EVERY 6 HOURS PRN
Status: DISCONTINUED | OUTPATIENT
Start: 2019-11-12 | End: 2019-11-15 | Stop reason: HOSPADM

## 2019-11-12 RX ORDER — HYDROCODONE BITARTRATE AND ACETAMINOPHEN 7.5; 325 MG/1; MG/1
1 TABLET ORAL ONCE AS NEEDED
Status: COMPLETED | OUTPATIENT
Start: 2019-11-12 | End: 2019-11-12

## 2019-11-12 RX ORDER — PROPOFOL 10 MG/ML
VIAL (ML) INTRAVENOUS AS NEEDED
Status: DISCONTINUED | OUTPATIENT
Start: 2019-11-12 | End: 2019-11-12 | Stop reason: SURG

## 2019-11-12 RX ORDER — CEFAZOLIN SODIUM 2 G/100ML
INJECTION, SOLUTION INTRAVENOUS AS NEEDED
Status: DISCONTINUED | OUTPATIENT
Start: 2019-11-12 | End: 2019-11-12 | Stop reason: SURG

## 2019-11-12 RX ORDER — PROMETHAZINE HYDROCHLORIDE 25 MG/1
25 TABLET ORAL ONCE AS NEEDED
Status: DISCONTINUED | OUTPATIENT
Start: 2019-11-12 | End: 2019-11-12 | Stop reason: HOSPADM

## 2019-11-12 RX ORDER — MELATONIN
2000 DAILY
COMMUNITY

## 2019-11-12 RX ORDER — FENTANYL CITRATE 50 UG/ML
25 INJECTION, SOLUTION INTRAMUSCULAR; INTRAVENOUS
Status: DISCONTINUED | OUTPATIENT
Start: 2019-11-12 | End: 2019-11-12 | Stop reason: HOSPADM

## 2019-11-12 RX ORDER — LIDOCAINE HYDROCHLORIDE 20 MG/ML
INJECTION, SOLUTION INFILTRATION; PERINEURAL AS NEEDED
Status: DISCONTINUED | OUTPATIENT
Start: 2019-11-12 | End: 2019-11-12 | Stop reason: SURG

## 2019-11-12 RX ORDER — MIDAZOLAM HYDROCHLORIDE 1 MG/ML
2 INJECTION INTRAMUSCULAR; INTRAVENOUS
Status: DISCONTINUED | OUTPATIENT
Start: 2019-11-12 | End: 2019-11-12 | Stop reason: HOSPADM

## 2019-11-12 RX ORDER — LABETALOL HYDROCHLORIDE 5 MG/ML
5 INJECTION, SOLUTION INTRAVENOUS
Status: DISCONTINUED | OUTPATIENT
Start: 2019-11-12 | End: 2019-11-12 | Stop reason: HOSPADM

## 2019-11-12 RX ORDER — ALLOPURINOL 100 MG/1
100 TABLET ORAL DAILY
Status: DISCONTINUED | OUTPATIENT
Start: 2019-11-12 | End: 2019-11-15 | Stop reason: HOSPADM

## 2019-11-12 RX ORDER — ONDANSETRON 2 MG/ML
4 INJECTION INTRAMUSCULAR; INTRAVENOUS ONCE AS NEEDED
Status: COMPLETED | OUTPATIENT
Start: 2019-11-12 | End: 2019-11-12

## 2019-11-12 RX ORDER — HYDROMORPHONE HYDROCHLORIDE 1 MG/ML
0.25 INJECTION, SOLUTION INTRAMUSCULAR; INTRAVENOUS; SUBCUTANEOUS
Status: DISCONTINUED | OUTPATIENT
Start: 2019-11-12 | End: 2019-11-12 | Stop reason: HOSPADM

## 2019-11-12 RX ORDER — NALOXONE HCL 0.4 MG/ML
0.2 VIAL (ML) INJECTION AS NEEDED
Status: DISCONTINUED | OUTPATIENT
Start: 2019-11-12 | End: 2019-11-12 | Stop reason: HOSPADM

## 2019-11-12 RX ORDER — GLYCOPYRROLATE 0.2 MG/ML
INJECTION INTRAMUSCULAR; INTRAVENOUS AS NEEDED
Status: DISCONTINUED | OUTPATIENT
Start: 2019-11-12 | End: 2019-11-12 | Stop reason: SURG

## 2019-11-12 RX ORDER — KETOROLAC TROMETHAMINE 30 MG/ML
INJECTION, SOLUTION INTRAMUSCULAR; INTRAVENOUS AS NEEDED
Status: DISCONTINUED | OUTPATIENT
Start: 2019-11-12 | End: 2019-11-12 | Stop reason: SURG

## 2019-11-12 RX ADMIN — FENTANYL CITRATE 50 MCG: 50 INJECTION INTRAMUSCULAR; INTRAVENOUS at 08:27

## 2019-11-12 RX ADMIN — FENTANYL CITRATE 50 MCG: 50 INJECTION INTRAMUSCULAR; INTRAVENOUS at 08:38

## 2019-11-12 RX ADMIN — CEFAZOLIN SODIUM 2 G: 2 INJECTION, SOLUTION INTRAVENOUS at 08:34

## 2019-11-12 RX ADMIN — PROPOFOL 150 MG: 10 INJECTION, EMULSION INTRAVENOUS at 08:29

## 2019-11-12 RX ADMIN — CARBIDOPA AND LEVODOPA 1 TABLET: 25; 100 TABLET ORAL at 21:11

## 2019-11-12 RX ADMIN — ONDANSETRON 4 MG: 2 INJECTION INTRAMUSCULAR; INTRAVENOUS at 09:37

## 2019-11-12 RX ADMIN — SODIUM CHLORIDE, POTASSIUM CHLORIDE, SODIUM LACTATE AND CALCIUM CHLORIDE 9 ML/HR: 600; 310; 30; 20 INJECTION, SOLUTION INTRAVENOUS at 07:07

## 2019-11-12 RX ADMIN — FENTANYL CITRATE 25 MCG: 50 INJECTION, SOLUTION INTRAMUSCULAR; INTRAVENOUS at 10:14

## 2019-11-12 RX ADMIN — METOPROLOL TARTRATE 25 MG: 25 TABLET ORAL at 21:11

## 2019-11-12 RX ADMIN — ALLOPURINOL 100 MG: 100 TABLET ORAL at 16:38

## 2019-11-12 RX ADMIN — LIDOCAINE HYDROCHLORIDE 100 MG: 20 INJECTION, SOLUTION INFILTRATION; PERINEURAL at 08:29

## 2019-11-12 RX ADMIN — GLYCOPYRROLATE 0.2 MG: 0.2 INJECTION INTRAMUSCULAR; INTRAVENOUS at 08:27

## 2019-11-12 RX ADMIN — FENTANYL CITRATE 25 MCG: 50 INJECTION, SOLUTION INTRAMUSCULAR; INTRAVENOUS at 09:38

## 2019-11-12 RX ADMIN — FAMOTIDINE 20 MG: 10 INJECTION, SOLUTION INTRAVENOUS at 07:08

## 2019-11-12 RX ADMIN — DOCUSATE SODIUM 100 MG: 100 CAPSULE, LIQUID FILLED ORAL at 21:11

## 2019-11-12 RX ADMIN — HYDROCODONE BITARTRATE AND ACETAMINOPHEN 1 TABLET: 7.5; 325 TABLET ORAL at 10:07

## 2019-11-12 RX ADMIN — FENTANYL CITRATE 25 MCG: 50 INJECTION, SOLUTION INTRAMUSCULAR; INTRAVENOUS at 09:59

## 2019-11-12 RX ADMIN — DEXAMETHASONE SODIUM PHOSPHATE 4 MG: 10 INJECTION INTRAMUSCULAR; INTRAVENOUS at 08:42

## 2019-11-12 RX ADMIN — KETOROLAC TROMETHAMINE 30 MG: 30 INJECTION, SOLUTION INTRAMUSCULAR; INTRAVENOUS at 08:40

## 2019-11-12 RX ADMIN — PROPOFOL 20 MG: 10 INJECTION, EMULSION INTRAVENOUS at 08:38

## 2019-11-12 RX ADMIN — PRAVASTATIN SODIUM 40 MG: 40 TABLET ORAL at 21:11

## 2019-11-12 RX ADMIN — SODIUM CHLORIDE, PRESERVATIVE FREE 10 ML: 5 INJECTION INTRAVENOUS at 21:11

## 2019-11-12 RX ADMIN — CARBIDOPA AND LEVODOPA 1 TABLET: 25; 100 TABLET ORAL at 16:38

## 2019-11-12 NOTE — OP NOTE
Ankle Operative Note  Dr. JOHAN Keith II  [phone]    PATIENT NAME: Dustin Villeda  MRN: [id number]  : 1940 AGE: 79 y.o. GENDER: male  DATE OF OPERATION: 2019  PREOPERATIVE DIAGNOSIS: Unstable Lateral Malleolar Ankle Fracture  POSTOPERATIVE DIAGNOSIS: Unstable Lateral Malleolar Ankle Fracture  OPERATION PERFORMED: Right Procedure(s):  OPEN REDUCTION INTERNAL FIXATION RIGHT ANKLE  SURGEON: Evgeny Keith MD  Circulator: Alistair Strickland RN; Elidia Balderrama RN  Scrub Person: Juan Rothman; Rena Esteves  Vendor Representative: Thom Katz  ANESTHESIA: General  ASSISTANT: Barbara Kemp. This case would not have been possible without another set of skilled surgical hands for retraction, use of instrumentation, and general assistance.  This assistance was vital to the success of the case.  ESTIMATED BLOOD LOSS: Minimal  SPONGE AND NEEDLE COUNT: Correct  INDICATIONS: This patient is noted to have an unstable ankle injury that was not amenable to conservative treatment. The risks of surgery were discussed and included the risk of anesthesia, infection, wound healing problems, damage to neurovascular structures, implant loosening/fialure, nonunion, malunion, loss of range of motion, fracture, hardware prominence, the need for further procedures, medical complications, and others. No guarantees were made. The patient wished to proceed with surgery and a surgical consent was signed.  COMPONENTS:   · Fibula    [employer] 7 hole fibular locking plate    PERTINENT FINDINGS: Unstable Ankle Injury    DETAILS OF PROCEDURE:  The patient was met in the preoperative area. The site was marked. The consent and H&P were reviewed. The patient was then wheeled back to the operative suite and transferred to the operative table. The patient underwent anesthesia. A tourniquet was placed on the upper thigh. Surgical alcohol was used to thoroughly clean the entire operative extremity.    The leg was then  "prepped in the normal sterile fashion, which included Chloroprep and multiple layers of sterile drapes. After a surgical timeout in which administration of preoperative antibiotics and the surgical site were confirmed, the tourniquet was inflated.    LATERAL MALEOLUS   A skin incision based off of the lateral border of the fibula was used. Dissection was carried down to the fibula, ensuring that the superficial peroneal nerve was not damaged. The fracture site was identified. Hematoma and tissue in the fracture site was cleaned out. The bone was reduced.    LAG SCREWS   A lag screw was utilized to maintain reduction of the fibula drilled with AO technique.      A plate was chosen and clamped to the fibula. The position of the plate and the reduction was checked by fluoroscopy. The fibular plate was then fixed to the bone with screws proximally and distally, ensuring that no distal screws penetrated into the joint.     The tourniquet was then taken down and adequate hemostasis was achieved. The wound was thoroughly irrigated and the skin was closed with a deep 0 Vicryl, 2-0 Vicryl subcutaneously and finally the skin was closed.     A sterile dressing was applied and the ankle was placed into a CAM boot. The patient was awoken from anesthesia, moved to the [facility] and taken to the recovery room in stable condition. Sponge and needle count was correct. There were no complications. Patient tolerated the procedure well.    R \"Irving\" Sagar VELASQUEZ MD  Orthopaedic Surgery  [facility]  [phone]            "

## 2019-11-12 NOTE — THERAPY EVALUATION
Patient Name: Dustin Villeda  : 1940    MRN: [id number]                              Today's Date: 2019       Admit Date: 2019    Visit Dx: No diagnosis found.  Patient Active Problem List   Diagnosis   • Closed right ankle fracture     Past Medical History:   Diagnosis Date   • Ankle fracture     RIGHT ANKLE    • Anxiety     ABOUT CURRENT HEALTH SITUATION    • Constipation due to opioid therapy     NO BOWEL MOVEMENT IN 5 DAYS   • Coronary artery disease    • Diabetes mellitus (CMS/HCC)    • Gout     BIG TOES   • Hyperlipidemia    • Hypertension    • Parkinson disease (CMS/HCC)      Past Surgical History:   Procedure Laterality Date   • CATARACT EXTRACTION EXTRACAPSULAR W/ INTRAOCULAR LENS IMPLANTATION Bilateral    • COLONOSCOPY     • CORONARY ANGIOPLASTY WITH STENT PLACEMENT     • EXTRACORPOREAL SHOCK WAVE LITHOTRIPSY (ESWL)      OVER  10 YEARS AGO   • PILONIDAL CYSTECTOMY       General Information     Row Name 19 1345          PT Evaluation Time/Intention    Document Type  evaluation  -PC     Mode of Treatment  physical therapy  -PC     Row Name 19 1345          General Information    Patient Profile Reviewed?  yes  -PC     Prior Level of Function  independent: prior to injury, pt was independent in all mobility without an assistive device  -PC     Existing Precautions/Restrictions  fall;non-weight bearing  -PC     Barriers to Rehab  --  (Significant)  pt has Parkinson's and although he was an independent ambulator, wife states that he had balance problems  -PC     Row Name 19 1344          Home Main Entrance    Number of Stairs, Main Entrance  six  -PC     Stair Railings, Main Entrance  -- one railing  -     Row Name 19 1344          Stairs Within Home, Primary    Number of Stairs, Within Home, Primary  other (see comments)  (Significant)  16 stairs to get to bedroom/bathroom  -     Stair Railings, Within Home, Primary  -- one rail  -     Row Name  11/12/19 1345          Cognitive Assessment/Intervention- PT/OT    Orientation Status (Cognition)  oriented x 4  -PC     Row Name 11/12/19 1340          Safety Issues, Functional Mobility    Safety Issues Affecting Function (Mobility)  insight into deficits/self awareness;unable to maintain weight-bearing restrictions  -PC     Impairments Affecting Function (Mobility)  balance;strength  -PC       User Key  (r) = Recorded By, (t) = Taken By, (c) = Cosigned By    Initials Name Provider Type    PC Yas Corral, PT Physical Therapist        Mobility     Row Name 11/12/19 1345          Bed Mobility Assessment/Treatment    Bed Mobility Assessment/Treatment  supine-sit;sit-supine  -PC     Supine-Sit [place] (Bed Mobility)  contact guard  -PC     Sit-Supine [place] (Bed Mobility)  minimum assist (75% patient effort)  -PC     Comment (Bed Mobility)  assist to bring leg back onto bed  -PC     Row Name 11/12/19 3167          Transfer Assessment/Treatment    Comment (Transfers)  stood with crutches x 2 working on standing with NWB RLE, pt having difficulty maintaining balance in standing, and not able to support self in order to take a step, so tried with walker  -     Row Name 11/12/19 6831          Sit-Stand Transfer    Sit-Stand [place] (Transfers)  minimum assist (75% patient effort)  -PC     Assistive Device (Sit-Stand Transfers)  walker, front-wheeled;crutches, axillary  -     Row Name 11/12/19 9979          Gait/Stairs Assessment/Training    Gait/Stairs Assessment/Training  gait/ambulation independence;gait/ambulation assistive device  -     [place] Level (Gait)  moderate assist (50% patient effort);2 person assist  -PC     Assistive Device (Gait)  walker, standard  -     Distance in Feet (Gait)  attempted one step forward, pt did not have the upper body strength to support himself and maintain NWB status and he also had difficulty with balance, not able to take a step  -PC       User Key   (r) = Recorded By, (t) = Taken By, (c) = Cosigned By    Initials Name Provider Type     Yas Corral PT Physical Therapist        Obj/Interventions     Row Name 11/12/19 135          General ROM    GENERAL ROM COMMENTS  WFL x R ankle  -PC     Row Name 11/12/19 1351          MMT (Manual Muscle Testing)    General MMT Comments  B UE 4+/5, LLE 4+/5, RLE 3+/5 hip and knee, R ankle in boot  -PC     Row Name 11/12/19 [facility]          Static Sitting Balance    Level of [place] (Unsupported Sitting, Static Balance)  independent  -PC     Row Name 11/12/19 [facility]          Static Standing Balance    Level of [place] (Supported Standing, Static Balance)  minimal assist, 75% patient effort;moderate assist, 50 to 74% patient effort  -     Assistive Device Utilized (Supported Standing, Static Balance)  walker, standard  -PC     Row Name 11/12/19 [facility]          Dynamic Standing Balance    Level of [place], Reaches Outside Midline (Standing, Dynamic Balance)  moderate assist, 50 to 74% patient effort;2 person assist  -     Assistive Device Utilized (Supported Standing, Dynamic Balance)  walker, standard  -PC     [facility] Name 11/12/19 [facility]          Sensory Assessment/Intervention    Sensory General Assessment  no sensation deficits identified  -       User Key  (r) = Recorded By, (t) = Taken By, (c) = Cosigned By    Initials Name Provider Type     Yas Corral PT Physical Therapist        Goals/Plan     Row Name 11/12/19 [facility]          Bed Mobility Goal 1 (PT)    Activity/Assistive Device (Bed Mobility Goal 1, PT)  sit to supine/supine to sit  -     [place] Level/Cues Needed (Bed Mobility Goal 1, PT)  supervision required  -     Time Frame (Bed Mobility Goal 1, PT)  1 week  -     Row Name 11/12/19 1238          Transfer Goal 1 (PT)    Activity/Assistive Device (Transfer Goal 1, PT)  sit-to-stand/stand-to-sit;bed-to-chair/chair-to-bed  -     [place] Level/Cues Needed (Transfer Goal 1, PT)   minimum assist (75% or more patient effort)  -PC     Time Frame (Transfer Goal 1, PT)  1 week  -PC     Row Name 11/12/19 5674          Gait Training Goal 1 (PT)    Activity/Assistive Device (Gait Training Goal 1, PT)  gait (walking locomotion);assistive device use  -PC     [place] Level (Gait Training Goal 1, PT)  minimum assist (75% or more patient effort)  -PC     Distance (Gait Goal 1, PT)  5  -PC     Time Frame (Gait Training Goal 1, PT)  1 week  -PC     Row Name 11/12/19 6514          Stairs Goal 1 (PT)    Barriers (Stairs Goal 1, PT)  I don't think pt could safely manage one step and maintain NWB status, rec SNF or if has to go home, need w/c and assist to get into home and arrangements made in the home to stay on one level  -PC       User Key  (r) = Recorded By, (t) = Taken By, (c) = Cosigned By    Initials Name Provider Type    PC Yas Corral, PT Physical Therapist        Clinical Impression     Row Name 11/12/19 0571          Pain Assessment    Additional Documentation  Pain Scale: Numbers Pre/Post-Treatment (Group)  -PC     Row Name 11/12/19 2153          Pain Scale: Numbers Pre/Post-Treatment    Pain Scale: Numbers, Pretreatment  0/10 - no pain  -PC     Pain Location - Side  Right  -PC     Pain Location  ankle  -PC     Pain Intervention(s)  Medication (See MAR)  -PC     Row Name 11/12/19 7115          Plan of Care Review    Plan of Care Reviewed With  patient;spouse  -PC     Row Name 11/12/19 [facility]          Physical Therapy Clinical Impression    Criteria for Skilled Interventions Met (PT Clinical Impression)  yes;treatment indicated  -PC     Rehab Potential (PT Clinical Summary)  fair, will monitor progress closely  -PC     Row Name 11/12/19 6033          Positioning and Restraints    Pre-Treatment Position  in bed  -PC     Post Treatment Position  bed  -PC     In Bed  supine;patient within staff view;with family/caregiver;RLE elevated  -PC       User Key  (r) = Recorded By, (t) = Taken By, (c) =  Cosigned By    Initials Name Provider Type    PC Yas Corral, PT Physical Therapist        Outcome Measures     Row Name 11/12/19 1356          How much help from another person do you currently need...    Turning from your back to your side while in flat bed without using bedrails?  3  -PC     Moving from lying on back to sitting on the side of a flat bed without bedrails?  3  -PC     Moving to and from a bed to a chair (including a wheelchair)?  2  -PC     Standing up from a chair using your arms (e.g., wheelchair, bedside chair)?  3  -PC     Climbing 3-5 steps with a railing?  1  -PC     To walk in hospital room?  2  -PC     AM-PAC 6 Clicks Score (PT)  14  -PC     Row Name 11/12/19 8802          Functional Assessment    Outcome Measure Options  AM-PAC 6 Clicks Basic Mobility (PT)  -PC       User Key  (r) = Recorded By, (t) = Taken By, (c) = Cosigned By    Initials Name Provider Type    PC Yas Corral PT Physical Therapist        Physical Therapy Education     Title: PT OT SLP Therapies (In Progress)     Topic: Physical Therapy (In Progress)     Point: Mobility training (In Progress)     Learning Progress Summary           Patient Acceptance, E,D, NR by  at 11/12/2019  1:57 PM                   Point: Body mechanics (In Progress)     Learning Progress Summary           Patient Acceptance, E,D, NR by PC at 11/12/2019  1:57 PM                   Point: Precautions (In Progress)     Learning Progress Summary           Patient Acceptance, E,D, NR by  at 11/12/2019  1:57 PM                               User Key     Initials Effective Dates Name Provider Type Discipline     04/03/18 -  Yas Corral PT Physical Therapist PT              PT Recommendation and Plan  Planned Therapy Interventions (PT Eval): balance training, bed mobility training, gait training, stair training, transfer training, strengthening  Outcome Summary/Treatment Plan (PT)  Anticipated Discharge Disposition (PT): skilled nursing  facility  Plan of Care Reviewed With: patient  Outcome Summary: pt presents s/p R ankle fx with ORIF and is NWB L LE.  Pt has a history of Parkinson's and has a balance deficit at baseline, he also has general weakness B UE and is unable to maintain NWB status in order to take a step with a walker, attempted with crutches and a walker.  Pt is not safe to go home at this time due to this and also pt has 6 steps to enter home, and 16 steps to get to bedroom/bathroom.  Recommend pt go to SNF at discharge     Time Calculation:   PT Charges     Row Name 11/12/19 1401             Time Calculation    Start Time  1245  -PC      Stop Time  1310  -PC      Time Calculation (min)  25 min  -PC      PT Received On  11/12/19  -PC      PT - Next Appointment  11/13/19  -PC      PT Goal Re-Cert Due Date  11/19/19  -PC        User Key  (r) = Recorded By, (t) = Taken By, (c) = Cosigned By    Initials Name Provider Type    PC Yas Corral, PT Physical Therapist        Therapy Charges for Today     Code Description Service Date Service Provider Modifiers Qty    57833938501 HC PT EVAL MOD COMPLEXITY 2 11/12/2019 Yas Corral, PT GP 1    45645348097 HC PT THER PROC EA 15 MIN 11/12/2019 Yas Corral, PT GP 1    60857003723 HC PT THER SUPP EA 15 MIN 11/12/2019 Yas Corral, PT GP 1          PT G-Codes  Outcome Measure Options: AM-PAC 6 Clicks Basic Mobility (PT)  AM-PAC 6 Clicks Score (PT): 14    Yas Corral PT  11/12/2019

## 2019-11-12 NOTE — ANESTHESIA PREPROCEDURE EVALUATION
Anesthesia Evaluation     Patient summary reviewed and Nursing notes reviewed   NPO Solid Status: > 8 hours  NPO Liquid Status: > 8 hours           Airway   Mallampati: III  Neck ROM: full  Possible difficult intubation  Dental    (+) partials    Pulmonary     breath sounds clear to auscultation  (+) a smoker Former,   Cardiovascular     Rhythm: regular    (+) hypertension, CAD, cardiac stents hyperlipidemia,       Neuro/Psych  (+) psychiatric history Anxiety,       ROS Comment: parkinsons  GI/Hepatic/Renal/Endo    (+)   diabetes mellitus,     Musculoskeletal     Abdominal    Substance History      OB/GYN          Other                        Anesthesia Plan    ASA 3     general     intravenous induction     Anesthetic plan, all risks, benefits, and alternatives have been provided, discussed and informed consent has been obtained with: patient.

## 2019-11-12 NOTE — PLAN OF CARE
Problem: Patient Care Overview  Goal: Plan of Care Review  Outcome: Ongoing (interventions implemented as appropriate)   11/12/19 9609   Coping/Psychosocial   Plan of Care Reviewed With patient   OTHER   Outcome Summary pt presents s/p R ankle fx with ORIF and is NWB R LE. Pt has a history of Parkinson's and has a balance deficit at baseline, he also has general weakness B UE and is unable to maintain NWB status in order to take a step with a walker, attempted with crutches and a walker. Pt is not safe to go home at this time due to this and also pt has 6 steps to enter home, and 16 steps to get to bedroom/bathroom. Recommend pt go to SNF at discharge

## 2019-11-12 NOTE — DISCHARGE INSTRUCTIONS
Ankle Discharge Instructions  Dr. JOHAN Duran” [place]  [phone]    • INCISION CARE  o Wash your hands prior to dressing changes  o The boot and postoperative dressings should be taken off starting on postoperative day #3. New dry dressings can then be placed around the ankle wounds and held in place with an Ace wrap. Dressings should be changed daily. The boot should be put back on after all dressing changes  No creams or ointments to the incision until 3 weeks post op (assuming no drainage).  o May remove dressing once the incision is completely free of drainage. But need to wait at least a week after surgery.  o Do not touch or pick at the incision  o Check incision every day and notify surgeon immediately if any of the following signs or symptoms are seen:  - Increase in redness  - Increase in swelling around the incision and of the entire extremity  - Increase in pain  - NEW drainage or oozing from the incision  - Pulling apart of the edges of the incision  - Increase in overall body temperature (greater than 100.4 degrees)  o Your surgeon will instruct you regarding suture or staple removal. In general, this happens at 2-week post op. If you are discharged to an SNF/SNU facility, they can and should remove your staples at 14 days.    • ACTIVITIES  o Exercises:    - Physical therapy will likely not begin immediately. Elderly patients going to a nursing home will get therapy as part of their care. Elderly patients going home may also have a therapist come to the house to help them mobilize. However, younger more active patients that can use crutches on their own will not need this acute therapy and can wait for bony healing before beginning  - Elevate the affected leg most of the day during the first week post operatively. Caution must be taken to avoid pillow placement directly under the heel of the leg, as this can cause pressure ulcers even with a soft pillow. All pillows and blankets should be placed  "underneath of the thigh and calf so that the heel is free-floating.  - Use cold packs for 20-30 minutes approximately 5 times per day.  - Your ankle was placed into a postoperative boot after surgery. It is okay to come out of the boot to work on ankle range of motion exercises. These exercises should include \"painting the alphabet.\" This should be done in a controlled manner while lying on the bed or couch. The boot be on at all times while ambulating, while maintaining strict non weight bearing.  o Activities of Daily Living:   - Showering may begin immediately if the boot can be protected. The boot and incision cannot get wet after surgery.   - No tub baths, hot tubs, or swimming pools for 4 weeks. If there is a fracture, you may have to wait longer than this for sufficient bony healing to be safe to go into a pool.  - May shower and let water run over the incision on postoperative day #10 if there is no drainage and the wound is well healing. A new dry dressing can be applied after showering and the ankle can be placed back into the boot.     • Restrictions  o Weight: Do not put weight on the ankle until instructed to do so. Your surgeon will discuss with you when you will be able to put weight on the leg.  o Driving: Many patients have questions about when it is safe to return to driving. The answer is that this is extremely variable. It depends on the extent of the surgery, as well as how quickly you heal. Certainly left leg surgeries make returning to driving easier while right leg surgeries require more extensive rehabilitation before driving can be safe. Until you can press down on the brake hard, and are off of all narcotics, driving is not permitted. Your surgeon cannot “clear” you to return to driving, only you can make the decision when you feel it is safe.     • Medications  o Anticoagulants: After upper extremity surgery most patients do not require an anticoagulant unless you have another injury that " will be keeping you from mobilizing. Lower extremity surgery typically does require use of an anticoagulation medicine.   - IF YOU HAD LOWER EXTREMITY SURGERY AND ARE NOT DISCHARGED HOME WITH ANY ANTICOAGULANT MEDICINE YOU SHOULD TAKE ASPIRIN 325mg DAILY FOR 30 DAYS POSTOPERATIVELY.  - If you are discharged home with an anticoagulant such as Aspirin, Xarelto, Eliquis, Coumadin, or Lovenox, follow these simple instructions:   - Notify surgeon immediately if any sushila bleeding is noted in the urine, stool, emesis, or from the nose or the incision. Blood in the stool will often appear as black rather than red. Blood in urine may appear as pink. Blood in emesis may appear as brown/black like coffee grounds.  - You will need to apply pressure for longer periods of time to any cuts or abrasions to stop bleeding  - Avoid alcohol while taking anticoagulants  - Most anticoagulants are to be taken for 30 days postoperatively. After this time, you may stop using them unless instructed otherwise.   - If you were already taking an anticoagulant (commonly Aspirin, Coumadin, or Plavix) you will likely be resuming your normal dose postoperatively and will be continuing that medication at the discretion of the prescribing physician.  o Stool Softeners: You will be at greater risk of constipation after surgery due to being less mobile and the pain medications.  - Take stool softeners as needed. Over the counter Colace 100 mg 1-2 capsules twice daily can be taken.  - If stools become too loose or too frequent, please decreases the dosage or stop the stool softener.  - If constipation occurs despite use of stool softeners, you are to continue the stool softeners and add a laxative (Milk of Magnesia 1 ounce daily as needed)  - Drink plenty of fluids, and eat fruits and vegetables during your recovery time. Getting up and mobilizing will help the bowels to recover their regular function, as will weaning off of all narcotics when the pain  becomes tolerable.  o Pain Medications utilized after surgery are narcotics. This is some general information about these medications.  - CLASSIFICATION: Pain medications are called Opioids and are narcotics  - LEGALITIES: It is illegal to share narcotics with others  - DRIVING: it is illegal to drive while under the influence of narcotics. Doing so is a DUI.  - POTENTIAL SIDE EFFECTS: nausea, vomiting, itching, dizziness, drowsiness, dry mouth, constipation, and difficulty urinating.  - POTENTIAL ADVERSE EFFECTS:  - Opioid tolerance can develop with use of pain medications and this simply means that it requires more and more of the medication to control pain. However, this is seen more in patients that use opioids for longer periods of time.  - Opioid dependence can develop with use of Opioids. People with opioid dependence will experience withdrawal symptoms upon cessation of the medication.  - Opioid addiction can develop with use of Opioids. The incidence of this is very unlikely in patients who take the medications as ordered and stop the medications as instructed.  - Opioid overdose can be dangerous, but is unlikely when the medication is taken as ordered and stopped when ordered. It is important not to mix opioids with alcohol as this can lead to over sedation and respiratory difficulty.  - DOSAGE:  - After the initial surgical pain begins to resolve, you may begin to decrease the pain medication. By the end of a few weeks, you should be off of pain medications.  - Refills will not be given by the office during evening hours, on weekends, or after 6 weeks post-op. You are responsible for weaning off of pain medication. You can increase the time between narcotic pills, taking one every 4 then 6 then 8 hours and so on.  - To seek refills on pain medications during the initial 6-week post-operative period, you must call the office to request the refill. The office will then notify you when to  the  prescription. DO NOT wait until you are out of the medication to request a refill. Prescriptions will not be filled over the weekend and depending on the schedule, it may take a couple days for the prescription to be available. Someone will have to pick the prescription in person at the office.    • FOLLOW-UP VISITS  o You will need to follow up in the office with your surgeon in 2 weeks, or as instructed elsewhere in your discharge paperwork. Please call this number [phone] to schedule this appointment. If you are going to an SNF facility, they will arrange for you to follow up in the office.   o If you have any concerns or suspected complications prior to your follow up visit, please call the office. Do not wait until your appointment time if you suspect complications. These will need to be addressed in the office promptly.      Evgeny Keith II, MD  Orthopaedic Surgery  [facility]        SEDATION DISCHARGE INSTRUCTIONS.    IMPORTANT: The following information will help you return to your best level of health.  GENERAL ANESTHESIA.  You have had general anesthesia. You were given a medicine to help you go to sleep and not feel pain.    Follow these instructions:   Go right home. Rest quietly at home today, then you can be up and about.   Do not drink alcohol, drive or operate machinery for 24 hours.   Do not make any important decisions or sign any legal papers in the next 24 hours.   Have a RESPONSIBLE PERSON stay with you the rest of today and overnight for your protection and safety.   Start your diet with fluids and light foods (jello, soup, juice, toast). Then eat a normal diet if not nauseated.    Call your doctor if you have:   any blue or gray skin color.   repeated vomiting.   trouble breathing.   any new problems or concerns.

## 2019-11-12 NOTE — PLAN OF CARE
Problem: Patient Care Overview  Goal: Plan of Care Review  Outcome: Ongoing (interventions implemented as appropriate)   11/12/19 6704   Coping/Psychosocial   Plan of Care Reviewed With patient   OTHER   Outcome Summary S/P R ankle ORIF. VSS. Pt denies pain. NWB. Boot to RLE. DTV 9610. Discussed bp med and monitoring r/t HTN. D/C plan pending.    Plan of Care Review   Progress improving     Goal: Individualization and Mutuality  Outcome: Ongoing (interventions implemented as appropriate)      Problem: Fall Risk (Adult)  Goal: Identify Related Risk Factors and Signs and Symptoms  Outcome: Outcome(s) achieved Date Met: 11/12/19    Goal: Absence of Fall  Outcome: Ongoing (interventions implemented as appropriate)      Problem: Surgery Nonspecified (Adult)  Goal: Signs and Symptoms of Listed Potential Problems Will be Absent, Minimized or Managed (Surgery Nonspecified)  Outcome: Ongoing (interventions implemented as appropriate)    Goal: Anesthesia/Sedation Recovery  Outcome: Ongoing (interventions implemented as appropriate)

## 2019-11-12 NOTE — ANESTHESIA POSTPROCEDURE EVALUATION
"Patient: Dustin Villeda    Procedure Summary     Date:  11/12/19 Room / Location:  [facility] OR [address] [place] [address] MAIN OR    Anesthesia Start:  0821 Anesthesia Stop:  0920    Procedure:  OPEN REDUCTION INTERNAL FIXATION RIGHT ANKLE (Right Ankle) Diagnosis:      Surgeon:  Evgeny Keith II, MD Provider:  Byron Naranjo MD    Anesthesia Type:  general ASA Status:  3          Anesthesia Type: general  Last vitals  BP   136/60 (11/12/19 1100)   Temp   36.7 °C (98 °F) (11/12/19 1020)   Pulse   86 (11/12/19 1100)   Resp   16 (11/12/19 1100)     SpO2   92 % (11/12/19 1100)     Post Anesthesia Care and Evaluation    Patient location during evaluation: bedside  Patient participation: complete - patient participated  Level of consciousness: awake  Pain score: 2  Pain management: adequate  Airway patency: patent  Anesthetic complications: No anesthetic complications  PONV Status: none  Cardiovascular status: acceptable  Respiratory status: acceptable  Hydration status: acceptable    Comments: /60 (BP Location: Left arm, Patient Position: Sitting)   Pulse 86   Temp 36.7 °C (98 °F) (Oral)   Resp 16   Ht 172 cm (67.72\")   Wt 75.3 kg (166 lb 1 oz)   SpO2 92%   BMI 25.46 kg/m²         "
hypertension

## 2019-11-12 NOTE — PERIOPERATIVE NURSING NOTE
PT informed RN of safety concerns regarding pt discharge.  Pt unable to ambulate with NWB restriction using crutches, has hx parkinsons and 16 steps at home.  Called into OR to inform Dr. Keith of concerns, ok to admit per MD.  Pt and wife aware.

## 2019-11-13 LAB
ANION GAP SERPL CALCULATED.3IONS-SCNC: 15.2 MMOL/L (ref 5–15)
BUN BLD-MCNC: 40 MG/DL (ref 8–23)
BUN/CREAT SERPL: 24.8 (ref 7–25)
CALCIUM SPEC-SCNC: 8.8 MG/DL (ref 8.6–10.5)
CHLORIDE SERPL-SCNC: 103 MMOL/L (ref 98–107)
CO2 SERPL-SCNC: 20.8 MMOL/L (ref 22–29)
CREAT BLD-MCNC: 1.61 MG/DL (ref 0.76–1.27)
GFR SERPL CREATININE-BSD FRML MDRD: 42 ML/MIN/1.73
GLUCOSE BLD-MCNC: 146 MG/DL (ref 65–99)
GLUCOSE BLDC GLUCOMTR-MCNC: 160 MG/DL (ref 70–130)
GLUCOSE BLDC GLUCOMTR-MCNC: 174 MG/DL (ref 70–130)
GLUCOSE BLDC GLUCOMTR-MCNC: 188 MG/DL (ref 70–130)
GLUCOSE BLDC GLUCOMTR-MCNC: 204 MG/DL (ref 70–130)
HCT VFR BLD AUTO: 35.3 % (ref 37.5–51)
HGB BLD-MCNC: 11.8 G/DL (ref 13–17.7)
POTASSIUM BLD-SCNC: 5 MMOL/L (ref 3.5–5.2)
SODIUM BLD-SCNC: 139 MMOL/L (ref 136–145)

## 2019-11-13 PROCEDURE — 85014 HEMATOCRIT: CPT | Performed by: ORTHOPAEDIC SURGERY

## 2019-11-13 PROCEDURE — 82962 GLUCOSE BLOOD TEST: CPT

## 2019-11-13 PROCEDURE — 85018 HEMOGLOBIN: CPT | Performed by: ORTHOPAEDIC SURGERY

## 2019-11-13 PROCEDURE — 97110 THERAPEUTIC EXERCISES: CPT

## 2019-11-13 PROCEDURE — 25010000002 HYDROMORPHONE PER 4 MG: Performed by: ORTHOPAEDIC SURGERY

## 2019-11-13 PROCEDURE — 80048 BASIC METABOLIC PNL TOTAL CA: CPT | Performed by: ORTHOPAEDIC SURGERY

## 2019-11-13 RX ORDER — HYDROCODONE BITARTRATE AND ACETAMINOPHEN 5; 325 MG/1; MG/1
1 TABLET ORAL EVERY 4 HOURS PRN
Qty: 40 TABLET | Refills: 0 | Status: SHIPPED | OUTPATIENT
Start: 2019-11-13 | End: 2019-11-15

## 2019-11-13 RX ORDER — FAMOTIDINE 20 MG/1
20 TABLET, FILM COATED ORAL DAILY
Status: DISCONTINUED | OUTPATIENT
Start: 2019-11-14 | End: 2019-11-15 | Stop reason: HOSPADM

## 2019-11-13 RX ADMIN — FAMOTIDINE 40 MG: 40 TABLET, FILM COATED ORAL at 08:23

## 2019-11-13 RX ADMIN — CARBIDOPA AND LEVODOPA 1 TABLET: 25; 100 TABLET ORAL at 08:23

## 2019-11-13 RX ADMIN — HYDROCODONE BITARTRATE AND ACETAMINOPHEN 1 TABLET: 5; 325 TABLET ORAL at 12:20

## 2019-11-13 RX ADMIN — PRAVASTATIN SODIUM 40 MG: 40 TABLET ORAL at 20:05

## 2019-11-13 RX ADMIN — SODIUM CHLORIDE, PRESERVATIVE FREE 10 ML: 5 INJECTION INTRAVENOUS at 08:23

## 2019-11-13 RX ADMIN — METOPROLOL TARTRATE 25 MG: 25 TABLET ORAL at 08:23

## 2019-11-13 RX ADMIN — ALLOPURINOL 100 MG: 100 TABLET ORAL at 08:23

## 2019-11-13 RX ADMIN — CARBIDOPA AND LEVODOPA 1 TABLET: 25; 100 TABLET ORAL at 15:36

## 2019-11-13 RX ADMIN — DOCUSATE SODIUM 100 MG: 100 CAPSULE, LIQUID FILLED ORAL at 20:05

## 2019-11-13 RX ADMIN — HYDROMORPHONE HYDROCHLORIDE 1 MG: 1 INJECTION, SOLUTION INTRAMUSCULAR; INTRAVENOUS; SUBCUTANEOUS at 15:32

## 2019-11-13 RX ADMIN — DOCUSATE SODIUM 100 MG: 100 CAPSULE, LIQUID FILLED ORAL at 08:23

## 2019-11-13 RX ADMIN — METOPROLOL TARTRATE 25 MG: 25 TABLET ORAL at 20:05

## 2019-11-13 RX ADMIN — HYDROCODONE BITARTRATE AND ACETAMINOPHEN 1 TABLET: 5; 325 TABLET ORAL at 01:13

## 2019-11-13 RX ADMIN — HYDROCODONE BITARTRATE AND ACETAMINOPHEN 1 TABLET: 5; 325 TABLET ORAL at 05:20

## 2019-11-13 RX ADMIN — CARBIDOPA AND LEVODOPA 1 TABLET: 25; 100 TABLET ORAL at 20:05

## 2019-11-13 NOTE — PLAN OF CARE
Problem: Patient Care Overview  Goal: Plan of Care Review   11/13/19 4713   OTHER   Outcome Summary VSS, RLE ACE C/D/I, NERVE BLOCK WEARING OFF, VOIDING IN SMALL AMTS, REPORTS ADEQUATE PAIN CONTROL, BOOT TO RLE, BG ELEVATED, AWAITING [facility] CONSULT-HAVE VERIFIED CONSULT WAS CALLED, DISCHARGE PLANS PENDING , WILL CONTINUE TO MONITOR

## 2019-11-13 NOTE — THERAPY TREATMENT NOTE
Patient Name: Dustin Villeda  : 1940    MRN: [id number]                              Today's Date: 2019       Admit Date: 2019    Visit Dx: No diagnosis found.  Patient Active Problem List   Diagnosis   • Closed right ankle fracture     Past Medical History:   Diagnosis Date   • Ankle fracture     RIGHT ANKLE    • Anxiety     ABOUT CURRENT HEALTH SITUATION    • Constipation due to opioid therapy     NO BOWEL MOVEMENT IN 5 DAYS   • Coronary artery disease    • Diabetes mellitus (CMS/HCC)    • Gout     BIG TOES   • Hyperlipidemia    • Hypertension    • Parkinson disease (CMS/HCC)      Past Surgical History:   Procedure Laterality Date   • CATARACT EXTRACTION EXTRACAPSULAR W/ INTRAOCULAR LENS IMPLANTATION Bilateral    • COLONOSCOPY     • CORONARY ANGIOPLASTY WITH STENT PLACEMENT     • EXTRACORPOREAL SHOCK WAVE LITHOTRIPSY (ESWL)      OVER  10 YEARS AGO   • PILONIDAL CYSTECTOMY       General Information     Row Name 19 1334          PT Evaluation Time/Intention    Document Type  therapy note (daily note)  -PC     Mode of Treatment  physical therapy  -PC     Row Name 19 1333          Cognitive Assessment/Intervention- PT/OT    Orientation Status (Cognition)  oriented x 4  -PC     Row Name 19 1334          Safety Issues, Functional Mobility    Impairments Affecting Function (Mobility)  balance;strength  -PC       User Key  (r) = Recorded By, (t) = Taken By, (c) = Cosigned By    Initials Name Provider Type    PC Yas Corral PT Physical Therapist        Mobility     Row Name 19 1330          Bed Mobility Assessment/Treatment    Supine-Sit [place] (Bed Mobility)  contact guard  -PC     Row Name 19 1333          Sit-Stand Transfer    Sit-Stand [place] (Transfers)  contact guard  -PC     Assistive Device (Sit-Stand Transfers)  walker, front-wheeled  -PC     Row Name 19 133          Gait/Stairs Assessment/Training    [place] Level (Gait)   moderate assist (50% patient effort)  -PC     Assistive Device (Gait)  walker, front-wheeled  -PC     Distance in Feet (Gait)  pt able to take 4 steps forward with walker X 2,  required assist for support and for balance, after 4 steps, pt fatigued and was unable to take another step and maintain NWB status, worked on gait training twice with seated rest in between  -PC     Comment (Gait/Stairs)  pt was able to take a few steps today but needed assist and does not have enough upper body strength to safely navigate with walker and maintain NWB status  -PC       User Key  (r) = Recorded By, (t) = Taken By, (c) = Cosigned By    Initials Name Provider Type    PC Yas Corral PT Physical Therapist        Obj/Interventions     Row Name 11/13/19 1339          Static Sitting Balance    Level of [place] (Unsupported Sitting, Static Balance)  independent  -PC     Sitting Position (Unsupported Sitting, Static Balance)  sitting on edge of bed  -PC     Row Name 11/13/19 1339          Static Standing Balance    Level of [place] (Supported Standing, Static Balance)  contact guard assist  -PC     Time Able to Maintain Position (Supported Standing, Static Balance)  1 to 2 minutes  -PC     Assistive Device Utilized (Supported Standing, Static Balance)  walker, rolling  -PC     Row Name 11/13/19 1337          Dynamic Standing Balance    Level of [place], Reaches Outside Midline (Standing, Dynamic Balance)  minimal assist, 75% patient effort;moderate assist, 50 to 74% patient effort  -PC     Assistive Device Utilized (Supported Standing, Dynamic Balance)  walker, rolling  -PC       User Key  (r) = Recorded By, (t) = Taken By, (c) = Cosigned By    Initials Name Provider Type    PC Yas Corral PT Physical Therapist        Goals/Plan    No documentation.       Clinical Impression     Row Name 11/13/19 0907          Pain Assessment    Additional Documentation  Pain Scale: FACES Pre/Post-Treatment (Group)  -PC      Row Name 11/13/19 1340          Pain Scale: Numbers Pre/Post-Treatment    Pain Location - Side  Right  -PC     Pain Location  ankle  -PC     Pain Intervention(s)  Medication (See MAR);Cold applied;Repositioned  -PC     Row Name 11/13/19 1340          Pain Scale: FACES Pre/Post-Treatment    Pain: FACES Scale, Pretreatment  4-->hurts little more  -PC     Row Name 11/13/19 1340          Plan of Care Review    Plan of Care Reviewed With  patient;spouse  -PC     Row Name 11/13/19 1340          Positioning and Restraints    Pre-Treatment Position  in bed  -PC     Post Treatment Position  chair  -PC     In Chair  reclined;call light within reach;encouraged to call for assist;with family/caregiver  -PC       User Key  (r) = Recorded By, (t) = Taken By, (c) = Cosigned By    Initials Name Provider Type    PC Yas Corral, PT Physical Therapist        Outcome Measures     Row Name 11/13/19 1341          How much help from another person do you currently need...    Turning from your back to your side while in flat bed without using bedrails?  3  -PC     Moving from lying on back to sitting on the side of a flat bed without bedrails?  3  -PC     Moving to and from a bed to a chair (including a wheelchair)?  3  -PC     Standing up from a chair using your arms (e.g., wheelchair, bedside chair)?  3  -PC     Climbing 3-5 steps with a railing?  1  -PC     To walk in hospital room?  2  -PC     AM-PAC 6 Clicks Score (PT)  15  -PC       User Key  (r) = Recorded By, (t) = Taken By, (c) = Cosigned By    Initials Name Provider Type    PC Yas Corral, PT Physical Therapist        Physical Therapy Education     Title: PT OT SLP Therapies (Done)     Topic: Physical Therapy (Done)     Point: Mobility training (Done)     Learning Progress Summary           Patient Acceptance, E,D, DU,NR by PC at 11/13/2019  1:41 PM    Acceptance, E,D, NR by PC at 11/12/2019  1:57 PM   Family Acceptance, E,D, DU,NR by PC at 11/13/2019  1:41 PM                    Point: Body mechanics (Done)     Learning Progress Summary           Patient Acceptance, E,D, DU,NR by PC at 11/13/2019  1:41 PM    Acceptance, E,D, NR by PC at 11/12/2019  1:57 PM   Family Acceptance, E,D, DU,NR by PC at 11/13/2019  1:41 PM                   Point: Precautions (Done)     Learning Progress Summary           Patient Acceptance, E,D, DU,NR by PC at 11/13/2019  1:41 PM    Acceptance, E,D, NR by PC at 11/12/2019  1:57 PM   Family Acceptance, E,D, DU,NR by PC at 11/13/2019  1:41 PM                               User Key     Initials Effective Dates Name Provider Type Discipline     04/03/18 -  Yas Corral PT Physical Therapist PT              PT Recommendation and Plan  Planned Therapy Interventions (PT Eval): balance training, bed mobility training, gait training, stair training, transfer training, strengthening  Outcome Summary/Treatment Plan (PT)  Anticipated Discharge Disposition (PT): skilled nursing facility  Plan of Care Reviewed With: patient  Progress: improving  Outcome Summary: pt was able to stand with less assist today and he was able to take 3-4 steps forward with a walker, but required min assist and fatigues quickly, lacking the upper body strength necessary to maintain NWB status RLE.  Pt needed assist to provide support and to assist with balance deficit as he lost his balance twice during the 3-4 steps forward.  Pt has multiple sets of stairs at his home and would not be safe to attempt stairs at this time. Pt wife is unable to provide physical assist .     Time Calculation:   PT Charges     Row Name 11/13/19 1346             Time Calculation    Start Time  1308  -PC      Stop Time  1332  -PC      Time Calculation (min)  24 min  -PC      PT Received On  11/13/19  -PC      PT - Next Appointment  11/14/19  -PC        User Key  (r) = Recorded By, (t) = Taken By, (c) = Cosigned By    Initials Name Provider Type    PC Yas Corral PT Physical Therapist        Therapy  Charges for Today     Code Description Service Date Service Provider Modifiers Qty    60462824007 HC PT EVAL MOD COMPLEXITY 2 11/12/2019 Yas Corral, PT GP 1    36827664265 HC PT THER PROC EA 15 MIN 11/12/2019 Yas Corral, PT GP 1    47639306521 HC PT THER SUPP EA 15 MIN 11/12/2019 Yas Corral, PT GP 1    58745517005 HC PT THER PROC EA 15 MIN 11/13/2019 Yas Corral, PT GP 2    06041793406 HC PT THER SUPP EA 15 MIN 11/13/2019 Yas Corral, PT GP 1          PT G-Codes  Outcome Measure Options: AM-PAC 6 Clicks Basic Mobility (PT)  AM-PAC 6 Clicks Score (PT): 15    Yas Corral, PT  11/13/2019

## 2019-11-13 NOTE — PROGRESS NOTES
Orthopaedic Surgery   Daily Progress Note  Dr. JOHAN Keith II  [phone]  DEMOGRAPHICS:   · Dustin Villeda   · Age:79 y.o.   · MRN:[id number]  · Admitted: 11/12/2019    PROCEDURE: 1 Day Post-Op s/p Procedure(s):  OPEN REDUCTION INTERNAL FIXATION RIGHT ANKLE     HOSPITAL PROGRESS  · Patient Issues: Patient admitted for elective right ankle surgery when it was deemed he was not safe to go home  · Ambulation/Activity: Minimal activity since surgery     VITALS:  Vitals:    11/12/19 2208 11/12/19 2315 11/13/19 0308 11/13/19 0703   BP:  131/69 126/61 131/73   BP Location:  Right arm Right arm Right arm   Patient Position:  Lying Lying Lying   Pulse:  79 67 69   Resp: 14 14 14 16   Temp:  98.4 °F (36.9 °C) 98 °F (36.7 °C) 98.4 °F (36.9 °C)   TempSrc:  Oral Oral Oral   SpO2:  95% 96% 97%   Weight:       Height:           PHYSICAL EXAM:  · LUNGS: Equal chest rise, no shortness of air  · CARDIOVASCULAR: brisk capillary refill intact  · WOUND: Dressings clean dry and intact in the boot  · EXTREMITY: Operative Leg  · Pulses: brisk capillary refill intact  · Sensation: Sensation intact to light touch to the saphenous/sural/tibial/deep peroneal/superficial peroneal nerves, and grossly throughout the extremity.  · Motor: 5/5 EHL/FHL/TA/GS motor complexes    LABS:   Lab Results   Component Value Date    HGB 11.8 (L) 11/13/2019     Lab Results   Component Value Date    WBC 9.68 10/10/2017     Lab Results   Component Value Date    GLUCOSE 146 (H) 11/13/2019    CALCIUM 8.8 11/13/2019     11/13/2019    K 5.0 11/13/2019    CO2 20.8 (L) 11/13/2019     11/13/2019    BUN 40 (H) 11/13/2019    CREATININE 1.61 (H) 11/13/2019    EGFRIFNONA 42 (L) 11/13/2019    BCR 24.8 11/13/2019    ANIONGAP 15.2 (H) 11/13/2019       ASSESSMENT: Patient is a 79 y.o. male who is 1 Day Post-Op s/p Procedure(s):  OPEN REDUCTION INTERNAL FIXATION RIGHT ANKLE     PLAN:   · Weight Bearing: Non Weight Bearing  · Labs: Above lab values review.  "Plan: No intervention necessary at this time.   · PT/OT: To Mobilize  · Post-Op Xray: Hardware in good position. Acceptable bony reduction.   · Follow-Up: In office at 2 weeks postop  · Dispo: Pending physical therapy evaluation, he possibly could need SNF    R \"Irving\" Sagar VELASQUEZ MD  Orthopaedic Surgery  [facility]  [phone] - [place] Office  [phone] - [place] Office      "

## 2019-11-13 NOTE — PLAN OF CARE
Problem: Patient Care Overview  Goal: Plan of Care Review  Outcome: Ongoing (interventions implemented as appropriate)   11/13/19 3645   Coping/Psychosocial   Plan of Care Reviewed With patient   OTHER   Outcome Summary pt was able to stand with less assist today and he was able to take 3-4 steps forward with a walker, but required min assist and fatigues quickly, lacking the upper body strength necessary to maintain NWB status RLE. Pt needed assist to provide support and to assist with balance deficit as he lost his balance twice during the 3-4 steps forward. Pt has multiple sets of stairs at his home and would not be safe to attempt stairs at this time. Pt wife is unable to provide physical assist .  I recommend discharge to SNF to work on safe ambulation/ transfers   Plan of Care Review   Progress improving

## 2019-11-13 NOTE — PROGRESS NOTES
Discharge Planning Assessment  [facility]     Patient Name: Dustin Villeda  MRN: [id number]  Today's Date: 11/13/2019    Admit Date: 11/12/2019    Discharge Needs Assessment     Row Name 11/13/19 1521       Living Environment    Lives With  spouse    Current Living Arrangements  home/apartment/condo    Primary Care Provided by  self    Provides Primary Care For  no one    Family Caregiver if Needed  spouse    Quality of Family Relationships  helpful;involved;supportive       Resource/Environmental Concerns    Resource/Environmental Concerns  none    Transportation Concerns  car, none       Transition Planning    Patient/Family Anticipates Transition to  home with help/services;inpatient rehabilitation facility    Patient/Family Anticipated Services at Transition      Transportation Anticipated  family or friend will provide       Discharge Needs Assessment    Readmission Within the Last 30 Days  no previous admission in last 30 days    Concerns to be Addressed  no discharge needs identified    Equipment Currently Used at Home  wheelchair;walker, rolling;cane, straight    Anticipated Changes Related to Illness  none    Equipment Needed After Discharge  none        Discharge Plan     Row Name 11/13/19 1521       Plan    Plan  SNF- follow up with patient/wife tomorrow afternoon for facility choice    Patient/Family in Agreement with Plan  yes    Plan Comments  Spoke with patient and wife, Cecy, at bedside.  Introduced self and explained role.  Facesheet, PCP, and pharmacy verified.  Patient lives with his wife Cecy.  He uses a cane, walker and transport chair.  PT notes reviewed and patient will need SNF at [place].  Provided wife with SNF list and she will review it tongt.  CCP to follow up tomorrow afternoon for facility choices.  Transfer packet started and in CCP office. Celia Melissa RN        Destination      No service coordination in this encounter.      Durable Medical Equipment      No  service coordination in this encounter.      Dialysis/Infusion      No service coordination in this encounter.      Home Medical Care      No service coordination in this encounter.      Therapy      No service coordination in this encounter.      Community Resources      No service coordination in this encounter.        Expected Discharge Date and Time     Expected Discharge Date Expected Discharge Time    Nov 12, 2019         Demographic Summary     Row Name 11/13/19 1519       General Information    Admission Type  inpatient    Arrived From  PACU/recovery room    Required Notices Provided  Important Message from Medicare    Referral Source  admission list    Reason for Consult  discharge planning    Preferred Language  English        Functional Status     Row Name 11/13/19 1520       Functional Status    Usual Activity Tolerance  good    Current Activity Tolerance  moderate       Functional Status, IADL    Medications  assistive person    Meal Preparation  assistive person    Housekeeping  assistive person    Laundry  assistive person    Shopping  assistive person       Mental Status    General Appearance WDL  WDL        Psychosocial    No documentation.       Abuse/Neglect    No documentation.       Legal    No documentation.       Substance Abuse    No documentation.       Patient Forms    No documentation.           Celia Melissa RN

## 2019-11-13 NOTE — PLAN OF CARE
Problem: Patient Care Overview  Goal: Plan of Care Review  Outcome: Ongoing (interventions implemented as appropriate)   11/13/19 0223   Coping/Psychosocial   Plan of Care Reviewed With patient   OTHER   Outcome Summary VSS. Pain controlled with PO pain med and IM dilaudid x1 for breakthrough pain. NWB to RLE, boot in place. Stand pivot with assist x2 and walker. Worked with PT today and sat in chair. Discussed blood glucose monitoring and med r/t DM. Plans to d/c to rehab when stable.    Plan of Care Review   Progress improving       Problem: Fall Risk (Adult)  Goal: Absence of Fall  Outcome: Ongoing (interventions implemented as appropriate)      Problem: Surgery Nonspecified (Adult)  Goal: Anesthesia/Sedation Recovery  Outcome: Ongoing (interventions implemented as appropriate)

## 2019-11-13 NOTE — PLAN OF CARE
Problem: Patient Care Overview  Goal: Plan of Care Review  Outcome: Ongoing (interventions implemented as appropriate)    Goal: Individualization and Mutuality  Outcome: Ongoing (interventions implemented as appropriate)    Goal: Discharge Needs Assessment  Outcome: Ongoing (interventions implemented as appropriate)    Goal: Interprofessional Rounds/Family Conf  Outcome: Ongoing (interventions implemented as appropriate)      Problem: Fall Risk (Adult)  Goal: Absence of Fall  Outcome: Ongoing (interventions implemented as appropriate)      Problem: Surgery Nonspecified (Adult)  Goal: Signs and Symptoms of Listed Potential Problems Will be Absent, Minimized or Managed (Surgery Nonspecified)  Outcome: Ongoing (interventions implemented as appropriate)    Goal: Anesthesia/Sedation Recovery  Outcome: Ongoing (interventions implemented as appropriate)

## 2019-11-14 LAB
GLUCOSE BLDC GLUCOMTR-MCNC: 133 MG/DL (ref 70–130)
GLUCOSE BLDC GLUCOMTR-MCNC: 145 MG/DL (ref 70–130)
GLUCOSE BLDC GLUCOMTR-MCNC: 150 MG/DL (ref 70–130)
GLUCOSE BLDC GLUCOMTR-MCNC: 172 MG/DL (ref 70–130)
HCT VFR BLD AUTO: 34.8 % (ref 37.5–51)
HGB BLD-MCNC: 11.8 G/DL (ref 13–17.7)

## 2019-11-14 PROCEDURE — 85014 HEMATOCRIT: CPT | Performed by: ORTHOPAEDIC SURGERY

## 2019-11-14 PROCEDURE — 97110 THERAPEUTIC EXERCISES: CPT

## 2019-11-14 PROCEDURE — 82962 GLUCOSE BLOOD TEST: CPT

## 2019-11-14 PROCEDURE — 85018 HEMOGLOBIN: CPT | Performed by: ORTHOPAEDIC SURGERY

## 2019-11-14 RX ADMIN — METOPROLOL TARTRATE 25 MG: 25 TABLET ORAL at 21:14

## 2019-11-14 RX ADMIN — DOCUSATE SODIUM 100 MG: 100 CAPSULE, LIQUID FILLED ORAL at 08:02

## 2019-11-14 RX ADMIN — CARBIDOPA AND LEVODOPA 1 TABLET: 25; 100 TABLET ORAL at 21:14

## 2019-11-14 RX ADMIN — PRAVASTATIN SODIUM 40 MG: 40 TABLET ORAL at 21:14

## 2019-11-14 RX ADMIN — DOCUSATE SODIUM 100 MG: 100 CAPSULE, LIQUID FILLED ORAL at 21:14

## 2019-11-14 RX ADMIN — CARBIDOPA AND LEVODOPA 1 TABLET: 25; 100 TABLET ORAL at 16:35

## 2019-11-14 RX ADMIN — HYDROCODONE BITARTRATE AND ACETAMINOPHEN 1 TABLET: 5; 325 TABLET ORAL at 21:14

## 2019-11-14 RX ADMIN — SODIUM CHLORIDE, PRESERVATIVE FREE 10 ML: 5 INJECTION INTRAVENOUS at 08:03

## 2019-11-14 RX ADMIN — METOPROLOL TARTRATE 25 MG: 25 TABLET ORAL at 08:02

## 2019-11-14 RX ADMIN — CARBIDOPA AND LEVODOPA 1 TABLET: 25; 100 TABLET ORAL at 08:02

## 2019-11-14 RX ADMIN — FAMOTIDINE 20 MG: 20 TABLET, FILM COATED ORAL at 08:02

## 2019-11-14 RX ADMIN — SODIUM CHLORIDE, PRESERVATIVE FREE 10 ML: 5 INJECTION INTRAVENOUS at 21:15

## 2019-11-14 RX ADMIN — ALLOPURINOL 100 MG: 100 TABLET ORAL at 08:02

## 2019-11-14 NOTE — THERAPY TREATMENT NOTE
Patient Name: Dustin Villeda  : 1940    MRN: [id number]                              Today's Date: 2019       Admit Date: 2019    Visit Dx: No diagnosis found.  Patient Active Problem List   Diagnosis   • Closed right ankle fracture     Past Medical History:   Diagnosis Date   • Ankle fracture     RIGHT ANKLE    • Anxiety     ABOUT CURRENT HEALTH SITUATION    • Constipation due to opioid therapy     NO BOWEL MOVEMENT IN 5 DAYS   • Coronary artery disease    • Diabetes mellitus (CMS/HCC)    • Gout     BIG TOES   • Hyperlipidemia    • Hypertension    • Parkinson disease (CMS/HCC)      Past Surgical History:   Procedure Laterality Date   • ANKLE OPEN REDUCTION INTERNAL FIXATION Right 2019    Procedure: OPEN REDUCTION INTERNAL FIXATION RIGHT ANKLE;  Surgeon: Evgeny Keith II, MD;  Location: [facility];  Service: Orthopedics   • CATARACT EXTRACTION EXTRACAPSULAR W/ INTRAOCULAR LENS IMPLANTATION Bilateral    • COLONOSCOPY     • CORONARY ANGIOPLASTY WITH STENT PLACEMENT     • EXTRACORPOREAL SHOCK WAVE LITHOTRIPSY (ESWL)      OVER  10 YEARS AGO   • PILONIDAL CYSTECTOMY       General Information     Row Name 19 0933          PT Evaluation Time/Intention    Document Type  therapy note (daily note)  -PC     Mode of Treatment  physical therapy  -PC     Row Name 19 0933          General Information    Existing Precautions/Restrictions  fall;non-weight bearing  -PC     Row Name 19 0933          Cognitive Assessment/Intervention- PT/OT    Orientation Status (Cognition)  oriented x 4  -PC     Row Name 19 0933          Safety Issues, Functional Mobility    Safety Issues Affecting Function (Mobility)  insight into deficits/self awareness;unable to maintain weight-bearing restrictions  -PC     Impairments Affecting Function (Mobility)  balance;strength  -PC       User Key  (r) = Recorded By, (t) = Taken By, (c) = Cosigned By    Initials Name Provider Type     PC Yas Corral, PT Physical Therapist        Mobility     Row Name 11/14/19 0934          Bed Mobility Assessment/Treatment    Supine-Sit [place] (Bed Mobility)  contact guard  -PC     Assistive Device (Bed Mobility)  bed rails  -PC     Row Name 11/14/19 0934          Sit-Stand Transfer    Sit-Stand [place] (Transfers)  minimum assist (75% patient effort)  -PC     Assistive Device (Sit-Stand Transfers)  walker, front-wheeled  -PC     Row Name 11/14/19 0934          Gait/Stairs Assessment/Training    [place] Level (Gait)  moderate assist (50% patient effort)  -PC     Assistive Device (Gait)  walker, front-wheeled  -PC     Distance in Feet (Gait)  pt stood and used urinal, needed assist for balance and had difficutly maintaining NWB status while using urinal, after seated rest, he stood again and attempted steps, had difficutly even taking one step this morning, finally was able to pivot to chair with moderate assist  -PC       User Key  (r) = Recorded By, (t) = Taken By, (c) = Cosigned By    Initials Name Provider Type    PC Yas Corral, PT Physical Therapist        Obj/Interventions    No documentation.       Goals/Plan    No documentation.       Clinical Impression     Row Name 11/14/19 0939          Pain Scale: Numbers Pre/Post-Treatment    Pain Location - Side  Right  -PC     Pain Location  ankle  -PC     Pain Intervention(s)  Medication (See MAR);Repositioned  -PC     Row Name 11/14/19 0939          Pain Scale: FACES Pre/Post-Treatment    Pain: FACES Scale, Pretreatment  4-->hurts little more  -PC     Row Name 11/14/19 0939          Plan of Care Review    Plan of Care Reviewed With  patient  -PC     Row Name 11/14/19 0939          Positioning and Restraints    Pre-Treatment Position  in bed  -PC     Post Treatment Position  chair  -PC     In Chair  reclined;call light within reach;encouraged to call for assist;exit alarm on  -PC       User Key  (r) = Recorded By, (t) = Taken By, (c) =  Cosigned By    Initials Name Provider Type    PC Yas Corral, PT Physical Therapist        Outcome Measures     Row Name 11/14/19 0941          How much help from another person do you currently need...    Turning from your back to your side while in flat bed without using bedrails?  3  -PC     Moving from lying on back to sitting on the side of a flat bed without bedrails?  3  -PC     Moving to and from a bed to a chair (including a wheelchair)?  2  -PC     Standing up from a chair using your arms (e.g., wheelchair, bedside chair)?  2  -PC     Climbing 3-5 steps with a railing?  1  -PC     To walk in hospital room?  2  -PC     AM-PAC 6 Clicks Score (PT)  13  -PC       User Key  (r) = Recorded By, (t) = Taken By, (c) = Cosigned By    Initials Name Provider Type    PC Yas Corral, PT Physical Therapist        Physical Therapy Education     Title: PT OT SLP Therapies (In Progress)     Topic: Physical Therapy (In Progress)     Point: Mobility training (In Progress)     Learning Progress Summary           Patient Acceptance, E,D, NR by PC at 11/14/2019  9:41 AM    Acceptance, E,D, DU,NR by PC at 11/13/2019  1:41 PM    Acceptance, E,D, NR by PC at 11/12/2019  1:57 PM   Family Acceptance, E,D, DU,NR by PC at 11/13/2019  1:41 PM                   Point: Body mechanics (In Progress)     Learning Progress Summary           Patient Acceptance, E,D, NR by PC at 11/14/2019  9:41 AM    Acceptance, E,D, DU,NR by PC at 11/13/2019  1:41 PM    Acceptance, E,D, NR by PC at 11/12/2019  1:57 PM   Family Acceptance, E,D, DU,NR by PC at 11/13/2019  1:41 PM                   Point: Precautions (In Progress)     Learning Progress Summary           Patient Acceptance, E,D, NR by PC at 11/14/2019  9:41 AM    Acceptance, E,D, DU,NR by PC at 11/13/2019  1:41 PM    Acceptance, E,D, NR by PC at 11/12/2019  1:57 PM   Family Acceptance, E,D, DU,NR by PC at 11/13/2019  1:41 PM                               User Key     Initials Effective  Dates Name Provider Type Discipline    PC 04/03/18 -  Yas Corral PT Physical Therapist PT              PT Recommendation and Plan  Planned Therapy Interventions (PT Eval): balance training, bed mobility training, gait training, stair training, transfer training, strengthening  Outcome Summary/Treatment Plan (PT)  Anticipated Discharge Disposition (PT): skilled nursing facility  Plan of Care Reviewed With: patient  Progress: improving  Outcome Summary: pt cont to require moderate assist to transfer bed to chair, had difficulty with standing balance today while trying to stand and use urinal.  Pt cont to have balance deficits, and upper body weakness, making it difficult for him to maintain NWB status in order to take a step, only able to pivot to chair today.  Pt educated on importance of NWB, as he cont to lack full understanding.     Time Calculation:   PT Charges     Row Name 11/14/19 0945             Time Calculation    Start Time  0918  -PC      Stop Time  0931  -PC      Time Calculation (min)  13 min  -PC      PT Received On  11/14/19  -PC      PT - Next Appointment  11/15/19  -PC        User Key  (r) = Recorded By, (t) = Taken By, (c) = Cosigned By    Initials Name Provider Type    PC Yas Corral PT Physical Therapist        Therapy Charges for Today     Code Description Service Date Service Provider Modifiers Qty    89509722435 HC PT THER PROC EA 15 MIN 11/13/2019 Yas Corral, PT GP 2    24054047603 HC PT THER SUPP EA 15 MIN 11/13/2019 Yas Corral, PT GP 1    91401451762 HC PT THER PROC EA 15 MIN 11/14/2019 Yas Corral PT GP 1          PT G-Codes  Outcome Measure Options: AM-PAC 6 Clicks Basic Mobility (PT)  AM-PAC 6 Clicks Score (PT): 13    Yas Corral PT  11/14/2019

## 2019-11-14 NOTE — PROGRESS NOTES
Continued Stay Note  [facility]     Patient Name: Dustin Villeda  MRN: [id number]  Today's Date: 11/14/2019    Admit Date: 11/12/2019    Discharge Plan     Row Name 11/14/19 1503       Plan    Plan  SNF- [facility] to evaluate    Patient/Family in Agreement with Plan  yes    Plan Comments  Spoke with patient and wife at bedside.  1st choice for SNF is [facility].  Referral sent to Gissel and she will evaluate. Transfer packet in CCP office. Celia Melissa RN        Discharge Codes    No documentation.       Expected Discharge Date and Time     Expected Discharge Date Expected Discharge Time    Nov 12, 2019             Celia Melissa RN

## 2019-11-14 NOTE — DISCHARGE PLACEMENT REQUEST
"Dustin Arevalo (79 y.o. Male)     Date of Birth Social Security Number Address Home Phone MRN    1940  [id number]  [place] [address] [phone] [id number]    Tenriism Marital Status          Patient Refused        Admission Date Admission Type Admitting Provider Attending Provider Department, Room/Bed    11/12/19 Elective Evgeny Keith II, MD Sweet, Richard Alexander II, MD [address], P[address]/1    Discharge Date Discharge Disposition Discharge Destination                       Attending Provider:  Evgeny Keith II, MD    Allergies:  No Known Allergies    Isolation:  None   Infection:  None   Code Status:  CPR    Ht:  172 cm (67.72\")   Wt:  75.3 kg (166 lb 1 oz)    Admission Cmt:  None   Principal Problem:  None                Active Insurance as of 11/12/2019     Primary Coverage     Payor Plan Insurance Group Employer/Plan Group    MEDICARE MEDICARE A & B      Payor Plan Address Payor Plan Phone Number Payor Plan Fax Number Effective Dates    [address] [phone]  10/1/2007 - None Entered    [facility] [id number]       Subscriber Name Subscriber Birth Date Member ID       DUSTIN AREVALO 1940 [id number]           Secondary Coverage     Payor Plan Insurance Group Employer/Plan Group    ANTHEM BLUE CROSS [facility] SUPP [id number]     Payor Plan Address Payor Plan Phone Number Payor Plan Fax Number Effective Dates    [address]   12/1/2016 - None Entered    [facility] [id number]       Subscriber Name Subscriber Birth Date Member ID       DUSTIN AREVALO 1940 [id number]                 Emergency Contacts      (Rel.) Home Phone Work Phone Mobile Phone    Angi Arevalo (Spouse) [phone] -- --              "

## 2019-11-14 NOTE — DISCHARGE SUMMARY
Orthopaedic Discharge Summary  Dr. PRADO “[facility]  [phone]    NAME: Dustin MAGGY Hawthorneer PCP: Mal Barboza MD   :  MRN: [id number]  [id number] LOS:  ADMIT: 2 days  2019   AGE/SEX: 79 y.o. male DC:  tomorrow             · Admitting Diagnosis: Closed right ankle fracture [S82.891A]    · Surgery Performed: OPEN REDUCTION INTERNAL FIXATION RIGHT ANKLE    · Discharge Medications:         Discharge Medications      Changes to Medications      Instructions Start Date   HYDROcodone-acetaminophen 5-325 MG per tablet  Commonly known as:  NORCO  What changed:  Another medication with the same name was added. Make sure you understand how and when to take each.   1 tablet, Oral, Every 6 Hours PRN      HYDROcodone-acetaminophen 5-325 MG per tablet  Commonly known as:  YOKO  What changed:  You were already taking a medication with the same name, and this prescription was added. Make sure you understand how and when to take each.   1 tablet, Oral, Every 4 Hours PRN      HYDROcodone-acetaminophen 5-325 MG per tablet  Commonly known as:  YOKO  What changed:  You were already taking a medication with the same name, and this prescription was added. Make sure you understand how and when to take each.   1 tablet, Oral, Every 4 Hours PRN         Continue These Medications      Instructions Start Date   allopurinol 100 MG tablet  Commonly known as:  ZYLOPRIM   100 mg, Oral, Daily      aspirin 81 MG EC tablet   81 mg, Oral, Daily      carbidopa-levodopa  MG per tablet  Commonly known as:  SINEMET   1 tablet, Oral, 3 Times Daily      cholecalciferol 25 MCG (1000 UT) tablet  Commonly known as:  VITAMIN D3   1,000 Units, Oral, Daily      fluticasone-salmeterol 100-50 MCG/DOSE DISKUS  Commonly known as:  ADVAIR   Inhalation, 2 Times Daily - RT      metoprolol tartrate 25 MG tablet  Commonly known as:  LOPRESSOR   25 mg, Oral, 2 Times Daily      montelukast 10 MG tablet  Commonly known as:  SINGULAIR   10 mg, Oral,  Nightly      pioglitazone 15 MG tablet  Commonly known as:  ACTOS   15 mg, Oral, Daily      pravastatin 40 MG tablet  Commonly known as:  PRAVACHOL   40 mg, Oral, Daily      Triamcinolone Acetonide 55 MCG/ACT nasal inhaler  Commonly known as:  NASACORT   2 sprays, Nasal, Daily      vitamin B-12 100 MCG tablet  Commonly known as:  CYANOCOBALAMIN   250 mcg, Oral, Daily      ZETIA 10 MG tablet  Generic drug:  ezetimibe   10 mg, Oral, Daily             · Vitals:     Vitals:    11/13/19 2312 11/14/19 0350 11/14/19 0711 11/14/19 1106   BP: 134/80 142/71 174/85 137/61   BP Location: Right arm Right arm Left arm Right arm   Patient Position: Lying Lying Sitting Lying   Pulse: 63 70 74 65   Resp: 18 18 18 18   Temp: 97.5 °F (36.4 °C) 97.4 °F (36.3 °C) 98.9 °F (37.2 °C) 99.6 °F (37.6 °C)   TempSrc: Oral Oral Oral Oral   SpO2: 98% 91% 95% 94%   Weight:       Height:           · Labs:      Admission on 11/12/2019   Component Date Value Ref Range Status   • Glucose 11/12/2019 146* 70 - 130 mg/dL Final   • Glucose 11/12/2019 144* 70 - 130 mg/dL Final   • ABO Type 11/12/2019 O   Final   • RH type 11/12/2019 Negative   Final   • Antibody Screen 11/12/2019 Negative   Final   • T&S Expiration Date 11/12/2019 11/15/2019 11:59:59 PM   Final   • Glucose 11/12/2019 252* 70 - 130 mg/dL Final   • Glucose 11/12/2019 258* 70 - 130 mg/dL Final   • Glucose 11/13/2019 146* 65 - 99 mg/dL Final   • BUN 11/13/2019 40* 8 - 23 mg/dL Final   • Creatinine 11/13/2019 1.61* 0.76 - 1.27 mg/dL Final   • Sodium 11/13/2019 139  136 - 145 mmol/L Final   • Potassium 11/13/2019 5.0  3.5 - 5.2 mmol/L Final   • Chloride 11/13/2019 103  98 - 107 mmol/L Final   • CO2 11/13/2019 20.8* 22.0 - 29.0 mmol/L Final   • Calcium 11/13/2019 8.8  8.6 - 10.5 mg/dL Final   • eGFR Non African Amer 11/13/2019 42* >60 mL/min/1.73 Final   • BUN/Creatinine Ratio 11/13/2019 24.8  7.0 - 25.0 Final   • Anion Gap 11/13/2019 15.2* 5.0 - 15.0 mmol/L Final   • Hemoglobin 11/13/2019 11.8*  "13.0 - 17.7 g/dL Final   • Hematocrit 11/13/2019 35.3* 37.5 - 51.0 % Final   • Glucose 11/13/2019 160* 70 - 130 mg/dL Final   • Glucose 11/13/2019 188* 70 - 130 mg/dL Final   • Glucose 11/13/2019 174* 70 - 130 mg/dL Final   • Glucose 11/13/2019 204* 70 - 130 mg/dL Final   • Hemoglobin 11/14/2019 11.8* 13.0 - 17.7 g/dL Final   • Hematocrit 11/14/2019 34.8* 37.5 - 51.0 % Final   • Glucose 11/14/2019 133* 70 - 130 mg/dL Final   • Glucose 11/14/2019 172* 70 - 130 mg/dL Final        No results found.    · Hospital Course:   79 y.o. male was admitted to [facility] to services of Evgeny Keith II, MD with Closed right ankle fracture [S82.891A] on 11/12/2019 and underwent OPEN REDUCTION INTERNAL FIXATION RIGHT ANKLE.  Post-operatively the patient transferred to the floor where the patient underwent mobilization therapy. Opioids were titrated to achieve appropriate pain management to allow for participation in mobilization exercises. Vital signs and laboratory values are now within safe parameters for discharge. The dressings and/or incision is intact without signs or symptoms of acute infection. Operative extremity neurovascular status remains intact as compared to the preoperative exam. Appropriate education re: incision care, activity levels, medications, and follow up visits was completed and all questions were answered. The patient is now deemed stable for discharge.    SNF: The patient had a difficult time mobilizing sufficiently with physical therapy. Further rehabilitation was recommended in a SNF facility. Once a bed was available, and the patient was cleared, there were discharged to the SNF in good condition}.       R \"Irving\" Sagar VELASQUEZ MD  Orthopaedic Surgery  [facility]  [phone]                                               "

## 2019-11-14 NOTE — PLAN OF CARE
Problem: Patient Care Overview  Goal: Plan of Care Review  Outcome: Ongoing (interventions implemented as appropriate)   11/14/19 3578   Coping/Psychosocial   Plan of Care Reviewed With patient   OTHER   Outcome Summary good pain control, transfers assist of 1 with walker, difficult time maintaining NWB status, plan to dc to [facility] tomorrow, educated on bp meds and monitoring   Plan of Care Review   Progress improving       Problem: Fall Risk (Adult)  Goal: Absence of Fall  Outcome: Ongoing (interventions implemented as appropriate)      Problem: Surgery Nonspecified (Adult)  Goal: Signs and Symptoms of Listed Potential Problems Will be Absent, Minimized or Managed (Surgery Nonspecified)  Outcome: Ongoing (interventions implemented as appropriate)      Problem: Mobility, Physical Impaired (Adult)  Goal: Identify Related Risk Factors and Signs and Symptoms  Outcome: Outcome(s) achieved Date Met: 11/14/19    Goal: Enhanced Mobility Skills  Outcome: Ongoing (interventions implemented as appropriate)

## 2019-11-14 NOTE — PLAN OF CARE
Problem: Patient Care Overview  Goal: Plan of Care Review  Outcome: Ongoing (interventions implemented as appropriate)   11/14/19 0005   Coping/Psychosocial   Plan of Care Reviewed With patient   OTHER   Outcome Summary patient resting comfortably, pain controlled at this time, patient continues to have difficulty maintain NWB when out of bed, educated on glucose monitoring   Plan of Care Review   Progress improving     Goal: Individualization and Mutuality  Outcome: Ongoing (interventions implemented as appropriate)    Goal: Discharge Needs Assessment  Outcome: Ongoing (interventions implemented as appropriate)    Goal: Interprofessional Rounds/Family Conf  Outcome: Ongoing (interventions implemented as appropriate)      Problem: Fall Risk (Adult)  Goal: Absence of Fall  Outcome: Ongoing (interventions implemented as appropriate)      Problem: Surgery Nonspecified (Adult)  Goal: Signs and Symptoms of Listed Potential Problems Will be Absent, Minimized or Managed (Surgery Nonspecified)  Outcome: Ongoing (interventions implemented as appropriate)    Goal: Anesthesia/Sedation Recovery  Outcome: Outcome(s) achieved Date Met: 11/14/19

## 2019-11-14 NOTE — PLAN OF CARE
Problem: Patient Care Overview  Goal: Plan of Care Review  Outcome: Ongoing (interventions implemented as appropriate)   11/14/19 0941   Coping/Psychosocial   Plan of Care Reviewed With patient   OTHER   Outcome Summary pt cont to require moderate assist to transfer bed to chair, had difficulty with standing balance today while trying to stand and use urinal. Pt cont to have balance deficits, and upper body weakness, making it difficult for him to maintain NWB status in order to take a step, only able to pivot to chair today. Pt educated on importance of NWB, as he cont to lack full understanding.

## 2019-11-14 NOTE — PROGRESS NOTES
Orthopaedic Surgery   Daily Progress Note  Dr. JOHAN Keith II  [phone]  DEMOGRAPHICS:   · Dustin Villeda   · Age:79 y.o.   · MRN:[id number]  · Admitted: 11/12/2019    PROCEDURE: 2 Days Post-Op s/p Procedure(s):  OPEN REDUCTION INTERNAL FIXATION RIGHT ANKLE     HOSPITAL PROGRESS  · Patient Issues: Patient doing well, no complaints  · Ambulation/Activity: Working went on mobility exercises with therapy     VITALS:  Vitals:    11/13/19 2312 11/14/19 0350 11/14/19 0711 11/14/19 1106   BP: 134/80 142/71 174/85 137/61   BP Location: Right arm Right arm Left arm Right arm   Patient Position: Lying Lying Sitting Lying   Pulse: 63 70 74 65   Resp: 18 18 18 18   Temp: 97.5 °F (36.4 °C) 97.4 °F (36.3 °C) 98.9 °F (37.2 °C) 99.6 °F (37.6 °C)   TempSrc: Oral Oral Oral Oral   SpO2: 98% 91% 95% 94%   Weight:       Height:           PHYSICAL EXAM:  · LUNGS: Equal chest rise, no shortness of air  · CARDIOVASCULAR: brisk capillary refill intact  · WOUND: Dressings clean dry and intact in the boot  · EXTREMITY: Operative Leg  · Pulses: brisk capillary refill intact  · Sensation:  intact to the toes   · Motor: Able to wiggle his toes in the splint    LABS:   Lab Results   Component Value Date    HGB 11.8 (L) 11/14/2019     Lab Results   Component Value Date    WBC 9.68 10/10/2017     Lab Results   Component Value Date    GLUCOSE 146 (H) 11/13/2019    CALCIUM 8.8 11/13/2019     11/13/2019    K 5.0 11/13/2019    CO2 20.8 (L) 11/13/2019     11/13/2019    BUN 40 (H) 11/13/2019    CREATININE 1.61 (H) 11/13/2019    EGFRIFNONA 42 (L) 11/13/2019    BCR 24.8 11/13/2019    ANIONGAP 15.2 (H) 11/13/2019       ASSESSMENT: Patient is a 79 y.o. male who is 2 Days Post-Op s/p Procedure(s):  OPEN REDUCTION INTERNAL FIXATION RIGHT ANKLE     PLAN:   · Weight Bearing: Non Weight Bearing  · Labs: Above lab values review. Plan: No intervention necessary at this time.   · PT/OT: To Mobilize  · Post-Op Xray: Hardware in good position.  "Acceptable bony reduction.   · Follow-Up: In office at 2 weeks postop  · Dispo: Pending physical therapy evaluation, he possibly could need SNF    R \"Irving\" Sagar VELASQUEZ MD  Orthopaedic Surgery  [facility]  [phone] - [place] Office  [phone] - [place] Office      "

## 2019-11-14 NOTE — PROGRESS NOTES
Continued Stay Note  [facility]     Patient Name: Dustin Villeda  MRN: [id number]  Today's Date: 11/14/2019    Admit Date: 11/12/2019    Discharge Plan     Row Name 11/14/19 1539       Plan    Plan  SNF- [facility] to evaluate    Patient/Family in Agreement with Plan  yes    Plan Comments  Spoke with Gissel/[facility].  Facility has accepted and will have bed tomorrow.  Wife will transport. Celia Melissa RN    Row Name 11/14/19 7066       Plan    Plan  SNF- [facility] to evaluate    Patient/Family in Agreement with Plan  yes    Plan Comments  Spoke with patient and wife at bedside.  1st choice for SNF is [facility].  Referral sent to Gissel and she will evaluate. Transfer packet in CCP office. Celia Melissa RN        Discharge Codes    No documentation.       Expected Discharge Date and Time     Expected Discharge Date Expected Discharge Time    Nov 15, 2019             Celia Melissa RN

## 2019-11-15 VITALS
HEIGHT: 68 IN | DIASTOLIC BLOOD PRESSURE: 61 MMHG | HEART RATE: 71 BPM | RESPIRATION RATE: 16 BRPM | WEIGHT: 166.06 LBS | OXYGEN SATURATION: 93 % | BODY MASS INDEX: 25.17 KG/M2 | TEMPERATURE: 98.9 F | SYSTOLIC BLOOD PRESSURE: 143 MMHG

## 2019-11-15 LAB
GLUCOSE BLDC GLUCOMTR-MCNC: 133 MG/DL (ref 70–130)
GLUCOSE BLDC GLUCOMTR-MCNC: 136 MG/DL (ref 70–130)
HCT VFR BLD AUTO: 34.7 % (ref 37.5–51)
HGB BLD-MCNC: 11.9 G/DL (ref 13–17.7)

## 2019-11-15 PROCEDURE — 85014 HEMATOCRIT: CPT | Performed by: ORTHOPAEDIC SURGERY

## 2019-11-15 PROCEDURE — 82962 GLUCOSE BLOOD TEST: CPT

## 2019-11-15 PROCEDURE — 85018 HEMOGLOBIN: CPT | Performed by: ORTHOPAEDIC SURGERY

## 2019-11-15 RX ADMIN — ALLOPURINOL 100 MG: 100 TABLET ORAL at 08:33

## 2019-11-15 RX ADMIN — HYDROCODONE BITARTRATE AND ACETAMINOPHEN 1 TABLET: 5; 325 TABLET ORAL at 05:53

## 2019-11-15 RX ADMIN — CARBIDOPA AND LEVODOPA 1 TABLET: 25; 100 TABLET ORAL at 08:33

## 2019-11-15 RX ADMIN — DOCUSATE SODIUM 100 MG: 100 CAPSULE, LIQUID FILLED ORAL at 08:33

## 2019-11-15 RX ADMIN — FAMOTIDINE 20 MG: 20 TABLET, FILM COATED ORAL at 08:33

## 2019-11-15 RX ADMIN — METOPROLOL TARTRATE 25 MG: 25 TABLET ORAL at 08:33

## 2019-11-15 NOTE — PLAN OF CARE
Problem: Patient Care Overview  Goal: Plan of Care Review  Outcome: Ongoing (interventions implemented as appropriate)   11/15/19 8521   Coping/Psychosocial   Plan of Care Reviewed With patient   OTHER   Outcome Summary pain under control with pain meds. vitals stable. Dressing dry and intact. voiding without difficulty. NWB maintained to RLE. Neuro vascular checks intact. Educated on blood pressure monitoring, verbalises understanding. Will contiue to monitor.   Plan of Care Review   Progress improving     Goal: Individualization and Mutuality  Outcome: Ongoing (interventions implemented as appropriate)    Goal: Discharge Needs Assessment  Outcome: Ongoing (interventions implemented as appropriate)    Goal: Interprofessional Rounds/Family Conf  Outcome: Ongoing (interventions implemented as appropriate)      Problem: Fall Risk (Adult)  Goal: Absence of Fall  Outcome: Ongoing (interventions implemented as appropriate)

## 2019-11-15 NOTE — DISCHARGE PLACEMENT REQUEST
"Dustin Villeda (79 y.o. Male)     Date of Birth Social Security Number Address Home Phone MRN    [id number]  [id number]  [place] [id number] [phone] [id number]    Anglican Marital Status          Patient Refused        Admission Date Admission Type Admitting Provider Attending Provider Department, Room/Bed    11/12/19 Elective Evgeny Keith II, MD Sweet, Richard Alexander II, MD [address], P794/1    Discharge Date Discharge Disposition Discharge Destination         Skilled Nursing Facility (DC - External)              Attending Provider:  Evgeny Keith II, MD    Allergies:  No Known Allergies    Isolation:  None   Infection:  None   Code Status:  CPR    Ht:  172 cm (67.72\")   Wt:  75.3 kg (166 lb 1 oz)    Admission Cmt:  None   Principal Problem:  None                Active Insurance as of 11/12/2019     Primary Coverage     Payor Plan Insurance Group Employer/Plan Group    MEDICARE MEDICARE A & B      Payor Plan Address Payor Plan Phone Number Payor Plan Fax Number Effective Dates    [address] [phone]  10/1/2007 - None Entered    [facility] [id number]       Subscriber Name Subscriber Birth Date Member ID       DUSTIN VILLEDA 1940 [id number]           Secondary Coverage     Payor Plan Insurance Group Employer/Plan Group    ANTHEM BLUE CROSS [facility] SUPP [id number]     Payor Plan Address Payor Plan Phone Number Payor Plan Fax Number Effective Dates    [address]   12/1/2016 - None Entered    [facility] [address]       Subscriber Name Subscriber Birth Date Member ID       DUSTIN VILLEDA 1940 [id number]                 Emergency Contacts      (Rel.) Home Phone Work Phone Mobile Phone    Angi Villeda (Spouse) [phone] -- --                 Discharge Summary      Evgeny Keith II, MD at 11/14/19 1514            Orthopaedic Discharge Summary  Dr. JOHAN Keith II  [phone]    NAME: Dustin WINTER" Ronal PCP: Mal Barboza MD   :  MRN: [id number]  [id number] LOS:  ADMIT: 2 days  2019   AGE/SEX: 79 y.o. male DC:  tomorrow             · Admitting Diagnosis: Closed right ankle fracture [S82.891A]    · Surgery Performed: OPEN REDUCTION INTERNAL FIXATION RIGHT ANKLE    · Discharge Medications:         Discharge Medications      Changes to Medications      Instructions Start Date   HYDROcodone-acetaminophen 5-325 MG per tablet  Commonly known as:  KYRACO  What changed:  Another medication with the same name was added. Make sure you understand how and when to take each.   1 tablet, Oral, Every 6 Hours PRN      HYDROcodone-acetaminophen 5-325 MG per tablet  Commonly known as:  YOKO  What changed:  You were already taking a medication with the same name, and this prescription was added. Make sure you understand how and when to take each.   1 tablet, Oral, Every 4 Hours PRN      HYDROcodone-acetaminophen 5-325 MG per tablet  Commonly known as:  YOKO  What changed:  You were already taking a medication with the same name, and this prescription was added. Make sure you understand how and when to take each.   1 tablet, Oral, Every 4 Hours PRN         Continue These Medications      Instructions Start Date   allopurinol 100 MG tablet  Commonly known as:  ZYLOPRIM   100 mg, Oral, Daily      aspirin 81 MG EC tablet   81 mg, Oral, Daily      carbidopa-levodopa  MG per tablet  Commonly known as:  SINEMET   1 tablet, Oral, 3 Times Daily      cholecalciferol 25 MCG (1000 UT) tablet  Commonly known as:  VITAMIN D3   1,000 Units, Oral, Daily      fluticasone-salmeterol 100-50 MCG/DOSE DISKUS  Commonly known as:  ADVAIR   Inhalation, 2 Times Daily - RT      metoprolol tartrate 25 MG tablet  Commonly known as:  LOPRESSOR   25 mg, Oral, 2 Times Daily      montelukast 10 MG tablet  Commonly known as:  SINGULAIR   10 mg, Oral, Nightly      pioglitazone 15 MG tablet  Commonly known as:  ACTOS   15 mg, Oral, Daily       pravastatin 40 MG tablet  Commonly known as:  PRAVACHOL   40 mg, Oral, Daily      Triamcinolone Acetonide 55 MCG/ACT nasal inhaler  Commonly known as:  NASACORT   2 sprays, Nasal, Daily      vitamin B-12 100 MCG tablet  Commonly known as:  CYANOCOBALAMIN   250 mcg, Oral, Daily      ZETIA 10 MG tablet  Generic drug:  ezetimibe   10 mg, Oral, Daily             · Vitals:     Vitals:    11/13/19 2312 11/14/19 0350 11/14/19 0711 11/14/19 1106   BP: 134/80 142/71 174/85 137/61   BP Location: Right arm Right arm Left arm Right arm   Patient Position: Lying Lying Sitting Lying   Pulse: 63 70 74 65   Resp: 18 18 18 18   Temp: 97.5 °F (36.4 °C) 97.4 °F (36.3 °C) 98.9 °F (37.2 °C) 99.6 °F (37.6 °C)   TempSrc: Oral Oral Oral Oral   SpO2: 98% 91% 95% 94%   Weight:       Height:           · Labs:      Admission on 11/12/2019   Component Date Value Ref Range Status   • Glucose 11/12/2019 146* 70 - 130 mg/dL Final   • Glucose 11/12/2019 144* 70 - 130 mg/dL Final   • ABO Type 11/12/2019 O   Final   • RH type 11/12/2019 Negative   Final   • Antibody Screen 11/12/2019 Negative   Final   • T&S Expiration Date 11/12/2019 11/15/2019 11:59:59 PM   Final   • Glucose 11/12/2019 252* 70 - 130 mg/dL Final   • Glucose 11/12/2019 258* 70 - 130 mg/dL Final   • Glucose 11/13/2019 146* 65 - 99 mg/dL Final   • BUN 11/13/2019 40* 8 - 23 mg/dL Final   • Creatinine 11/13/2019 1.61* 0.76 - 1.27 mg/dL Final   • Sodium 11/13/2019 139  136 - 145 mmol/L Final   • Potassium 11/13/2019 5.0  3.5 - 5.2 mmol/L Final   • Chloride 11/13/2019 103  98 - 107 mmol/L Final   • CO2 11/13/2019 20.8* 22.0 - 29.0 mmol/L Final   • Calcium 11/13/2019 8.8  8.6 - 10.5 mg/dL Final   • eGFR Non African Amer 11/13/2019 42* >60 mL/min/1.73 Final   • BUN/Creatinine Ratio 11/13/2019 24.8  7.0 - 25.0 Final   • Anion Gap 11/13/2019 15.2* 5.0 - 15.0 mmol/L Final   • Hemoglobin 11/13/2019 11.8* 13.0 - 17.7 g/dL Final   • Hematocrit 11/13/2019 35.3* 37.5 - 51.0 % Final   • Glucose  "11/13/2019 160* 70 - 130 mg/dL Final   • Glucose 11/13/2019 188* 70 - 130 mg/dL Final   • Glucose 11/13/2019 174* 70 - 130 mg/dL Final   • Glucose 11/13/2019 204* 70 - 130 mg/dL Final   • Hemoglobin 11/14/2019 11.8* 13.0 - 17.7 g/dL Final   • Hematocrit 11/14/2019 34.8* 37.5 - 51.0 % Final   • Glucose 11/14/2019 133* 70 - 130 mg/dL Final   • Glucose 11/14/2019 172* 70 - 130 mg/dL Final        No results found.    · Hospital Course:   79 y.o. male was admitted to [facility] services of Evgeny Keith II, MD with Closed right ankle fracture [S82.891A] on 11/12/2019 and underwent OPEN REDUCTION INTERNAL FIXATION RIGHT ANKLE.  Post-operatively the patient transferred to the floor where the patient underwent mobilization therapy. Opioids were titrated to achieve appropriate pain management to allow for participation in mobilization exercises. Vital signs and laboratory values are now within safe parameters for discharge. The dressings and/or incision is intact without signs or symptoms of acute infection. Operative extremity neurovascular status remains intact as compared to the preoperative exam. Appropriate education re: incision care, activity levels, medications, and follow up visits was completed and all questions were answered. The patient is now deemed stable for discharge.    SNF: The patient had a difficult time mobilizing sufficiently with physical therapy. Further rehabilitation was recommended in a SNF facility. Once a bed was available, and the patient was cleared, there were discharged to the SNF in good condition}.       R \"Irving\" Sagar VELASQUEZ MD  Orthopaedic Surgery  [facility]  [phone]                                                 Electronically signed by Evgeny Keith II, MD at 11/14/19 6423       Discharge Order (From admission, onward)    Start     Ordered    11/14/19 1512  Discharge patient  Once     Expected Discharge Date:  11/15/19    Discharge " Disposition:  Skilled Nursing Facility (DC - External)    Physician of Record for Attribution - Please select from Treatment Team:  JEANINE GERONIMO II [[id number]]    Review needed by CMO to determine Physician of Record:  No       Question Answer Comment   Physician of Record for Attribution - Please select from Treatment Team JEANINE GERONIMO II    Review needed by CMO to determine Physician of Record No        11/14/19 1511    11/12/19 0646  Discharge patient  Once,   Status:  Canceled     Expected Discharge Date:  11/12/19    Discharge Disposition:  Home or Self Care    Review needed by CMO to determine Physician of Record:  No       Question:  Review needed by CMO to determine Physician of Record  Answer:  No    11/12/19 0646

## 2019-11-15 NOTE — PROGRESS NOTES
Continued Stay Note  [facility]     Patient Name: Dustin Villeda  MRN: [id number]  Today's Date: 11/15/2019    Admit Date: 11/12/2019    Discharge Plan     Row Name 11/15/19 1003       Plan    Plan Comments  DC orders in EPIC.  Spoke with Gissel/Nomi [place] and patient will be going to [facility] Room 104.  Transfer packet updated and dc summary faxed. Script x1 copied and original placed in transfer packet.  Nurse updated.  Celia Melissa RN        Discharge Codes    No documentation.       Expected Discharge Date and Time     Expected Discharge Date Expected Discharge Time    Nov 15, 2019             Celia Melissa RN

## 2019-11-18 NOTE — PROGRESS NOTES
Case Management Discharge Note    Final Note: DC to skilled at [facility]. Celia Melissa RN    Destination - Selection Complete      Service Provider Request Status Selected Services Address Phone Number Fax Number    [facility] Selected Skilled Nursing [address] [facility] [address] [phone]      Durable Medical Equipment      No service has been selected for the patient.      Dialysis/Infusion      No service has been selected for the patient.      Home Medical Care      No service has been selected for the patient.      Therapy      No service has been selected for the patient.      Community Resources      No service has been selected for the patient.             Final Discharge Disposition Code: 03 - skilled nursing facility (SNF)

## 2019-11-19 ENCOUNTER — APPOINTMENT (OUTPATIENT)
Dept: CT IMAGING | Facility: HOSPITAL | Age: 79
End: 2019-11-19

## 2019-11-19 ENCOUNTER — HOSPITAL ENCOUNTER (EMERGENCY)
Facility: HOSPITAL | Age: 79
Discharge: HOME | End: 2019-11-19
Attending: EMERGENCY MEDICINE | Admitting: EMERGENCY MEDICINE

## 2019-11-19 VITALS
DIASTOLIC BLOOD PRESSURE: 89 MMHG | SYSTOLIC BLOOD PRESSURE: 169 MMHG | BODY MASS INDEX: 24.25 KG/M2 | OXYGEN SATURATION: 98 % | HEART RATE: 88 BPM | TEMPERATURE: 98.4 F | RESPIRATION RATE: 18 BRPM | HEIGHT: 68 IN | WEIGHT: 160 LBS

## 2019-11-19 DIAGNOSIS — N20.1 RIGHT URETERAL STONE: Primary | ICD-10-CM

## 2019-11-19 LAB
ALBUMIN SERPL-MCNC: 3.4 G/DL (ref 3.5–5.2)
ALBUMIN/GLOB SERPL: 0.9 G/DL
ALP SERPL-CCNC: 123 U/L (ref 39–117)
ALT SERPL W P-5'-P-CCNC: <5 U/L (ref 1–41)
ANION GAP SERPL CALCULATED.3IONS-SCNC: 14.4 MMOL/L (ref 5–15)
AST SERPL-CCNC: 24 U/L (ref 1–40)
BACTERIA UR QL AUTO: ABNORMAL /HPF
BASOPHILS # BLD AUTO: 0.03 10*3/MM3 (ref 0–0.2)
BASOPHILS NFR BLD AUTO: 0.3 % (ref 0–1.5)
BILIRUB SERPL-MCNC: 0.7 MG/DL (ref 0.2–1.2)
BILIRUB UR QL STRIP: NEGATIVE
BUN BLD-MCNC: 24 MG/DL (ref 8–23)
BUN/CREAT SERPL: 18 (ref 7–25)
CALCIUM SPEC-SCNC: 9 MG/DL (ref 8.6–10.5)
CHLORIDE SERPL-SCNC: 104 MMOL/L (ref 98–107)
CLARITY UR: ABNORMAL
CO2 SERPL-SCNC: 22.6 MMOL/L (ref 22–29)
COLOR UR: YELLOW
CREAT BLD-MCNC: 1.33 MG/DL (ref 0.76–1.27)
DEPRECATED RDW RBC AUTO: 47.1 FL (ref 37–54)
EOSINOPHIL # BLD AUTO: 0.03 10*3/MM3 (ref 0–0.4)
EOSINOPHIL NFR BLD AUTO: 0.3 % (ref 0.3–6.2)
ERYTHROCYTE [DISTWIDTH] IN BLOOD BY AUTOMATED COUNT: 13.6 % (ref 12.3–15.4)
GFR SERPL CREATININE-BSD FRML MDRD: 52 ML/MIN/1.73
GLOBULIN UR ELPH-MCNC: 3.7 GM/DL
GLUCOSE BLD-MCNC: 206 MG/DL (ref 65–99)
GLUCOSE UR STRIP-MCNC: ABNORMAL MG/DL
HCT VFR BLD AUTO: 36.8 % (ref 37.5–51)
HGB BLD-MCNC: 13 G/DL (ref 13–17.7)
HGB UR QL STRIP.AUTO: ABNORMAL
HOLD SPECIMEN: NORMAL
HYALINE CASTS UR QL AUTO: ABNORMAL /LPF
IMM GRANULOCYTES # BLD AUTO: 0.06 10*3/MM3 (ref 0–0.05)
IMM GRANULOCYTES NFR BLD AUTO: 0.6 % (ref 0–0.5)
KETONES UR QL STRIP: ABNORMAL
LEUKOCYTE ESTERASE UR QL STRIP.AUTO: NEGATIVE
LIPASE SERPL-CCNC: 69 U/L (ref 13–60)
LYMPHOCYTES # BLD AUTO: 0.84 10*3/MM3 (ref 0.7–3.1)
LYMPHOCYTES NFR BLD AUTO: 8.1 % (ref 19.6–45.3)
MCH RBC QN AUTO: 34.3 PG (ref 26.6–33)
MCHC RBC AUTO-ENTMCNC: 35.3 G/DL (ref 31.5–35.7)
MCV RBC AUTO: 97.1 FL (ref 79–97)
MONOCYTES # BLD AUTO: 0.54 10*3/MM3 (ref 0.1–0.9)
MONOCYTES NFR BLD AUTO: 5.2 % (ref 5–12)
NEUTROPHILS # BLD AUTO: 8.87 10*3/MM3 (ref 1.7–7)
NEUTROPHILS NFR BLD AUTO: 85.5 % (ref 42.7–76)
NITRITE UR QL STRIP: NEGATIVE
NRBC BLD AUTO-RTO: 0 /100 WBC (ref 0–0.2)
PH UR STRIP.AUTO: <=5 [PH] (ref 5–8)
PLATELET # BLD AUTO: 301 10*3/MM3 (ref 140–450)
PMV BLD AUTO: 10 FL (ref 6–12)
POTASSIUM BLD-SCNC: 4.8 MMOL/L (ref 3.5–5.2)
PROT SERPL-MCNC: 7.1 G/DL (ref 6–8.5)
PROT UR QL STRIP: ABNORMAL
RBC # BLD AUTO: 3.79 10*6/MM3 (ref 4.14–5.8)
RBC # UR: ABNORMAL /HPF
REF LAB TEST METHOD: ABNORMAL
SODIUM BLD-SCNC: 141 MMOL/L (ref 136–145)
SP GR UR STRIP: 1.03 (ref 1–1.03)
SQUAMOUS #/AREA URNS HPF: ABNORMAL /HPF
UROBILINOGEN UR QL STRIP: ABNORMAL
WBC NRBC COR # BLD: 10.37 10*3/MM3 (ref 3.4–10.8)
WBC UR QL AUTO: ABNORMAL /HPF
WHOLE BLOOD HOLD SPECIMEN: NORMAL

## 2019-11-19 PROCEDURE — 83690 ASSAY OF LIPASE: CPT

## 2019-11-19 PROCEDURE — 25010000002 HYDROMORPHONE PER 4 MG: Performed by: PHYSICIAN ASSISTANT

## 2019-11-19 PROCEDURE — 96374 THER/PROPH/DIAG INJ IV PUSH: CPT

## 2019-11-19 PROCEDURE — 85025 COMPLETE CBC W/AUTO DIFF WBC: CPT

## 2019-11-19 PROCEDURE — 99284 EMERGENCY DEPT VISIT MOD MDM: CPT

## 2019-11-19 PROCEDURE — 74176 CT ABD & PELVIS W/O CONTRAST: CPT

## 2019-11-19 PROCEDURE — 80053 COMPREHEN METABOLIC PANEL: CPT

## 2019-11-19 PROCEDURE — 25010000002 ONDANSETRON PER 1 MG: Performed by: PHYSICIAN ASSISTANT

## 2019-11-19 PROCEDURE — 96375 TX/PRO/DX INJ NEW DRUG ADDON: CPT

## 2019-11-19 PROCEDURE — 81001 URINALYSIS AUTO W/SCOPE: CPT

## 2019-11-19 RX ORDER — ONDANSETRON 2 MG/ML
4 INJECTION INTRAMUSCULAR; INTRAVENOUS ONCE
Status: COMPLETED | OUTPATIENT
Start: 2019-11-19 | End: 2019-11-19

## 2019-11-19 RX ORDER — ONDANSETRON 4 MG/1
4 TABLET, ORALLY DISINTEGRATING ORAL EVERY 8 HOURS PRN
Qty: 15 TABLET | Refills: 0 | Status: ON HOLD | OUTPATIENT
Start: 2019-11-19 | End: 2021-01-01

## 2019-11-19 RX ORDER — TAMSULOSIN HYDROCHLORIDE 0.4 MG/1
1 CAPSULE ORAL DAILY
Qty: 30 CAPSULE | Refills: 0 | Status: ON HOLD | OUTPATIENT
Start: 2019-11-19 | End: 2021-01-01

## 2019-11-19 RX ORDER — HYDROMORPHONE HYDROCHLORIDE 1 MG/ML
0.5 INJECTION, SOLUTION INTRAMUSCULAR; INTRAVENOUS; SUBCUTANEOUS ONCE
Status: COMPLETED | OUTPATIENT
Start: 2019-11-19 | End: 2019-11-19

## 2019-11-19 RX ORDER — ONDANSETRON 2 MG/ML
4 INJECTION INTRAMUSCULAR; INTRAVENOUS ONCE
Status: DISCONTINUED | OUTPATIENT
Start: 2019-11-19 | End: 2019-11-19

## 2019-11-19 RX ORDER — HYDROMORPHONE HYDROCHLORIDE 1 MG/ML
0.5 INJECTION, SOLUTION INTRAMUSCULAR; INTRAVENOUS; SUBCUTANEOUS ONCE
Status: DISCONTINUED | OUTPATIENT
Start: 2019-11-19 | End: 2019-11-19

## 2019-11-19 RX ORDER — SODIUM CHLORIDE 0.9 % (FLUSH) 0.9 %
10 SYRINGE (ML) INJECTION AS NEEDED
Status: DISCONTINUED | OUTPATIENT
Start: 2019-11-19 | End: 2019-11-19 | Stop reason: HOSPADM

## 2019-11-19 RX ORDER — HYDROCODONE BITARTRATE AND ACETAMINOPHEN 5; 325 MG/1; MG/1
1 TABLET ORAL EVERY 6 HOURS PRN
Qty: 15 TABLET | Refills: 0 | Status: ON HOLD | OUTPATIENT
Start: 2019-11-19 | End: 2021-01-01

## 2019-11-19 RX ADMIN — LIDOCAINE HYDROCHLORIDE 125 MG: 10 INJECTION, SOLUTION INFILTRATION; PERINEURAL at 05:43

## 2019-11-19 RX ADMIN — ONDANSETRON 4 MG: 2 INJECTION INTRAMUSCULAR; INTRAVENOUS at 04:27

## 2019-11-19 RX ADMIN — SODIUM CHLORIDE 500 ML: 9 INJECTION, SOLUTION INTRAVENOUS at 04:32

## 2019-11-19 RX ADMIN — HYDROMORPHONE HYDROCHLORIDE 0.5 MG: 1 INJECTION, SOLUTION INTRAMUSCULAR; INTRAVENOUS; SUBCUTANEOUS at 04:29

## 2019-11-19 RX ADMIN — SODIUM CHLORIDE 500 ML: 9 INJECTION, SOLUTION INTRAVENOUS at 05:44

## 2019-11-19 NOTE — ED NOTES
Pt to ED via ambulance from [facility]. Pt c/o right flank pain that started at 1830. Pt hx kidney stones. Per Marisa, nurse he got norco 5 and flexeril 10mg at 2000.      Nazanin Sifuentes, RN  11/19/19 0720

## 2019-11-19 NOTE — ED PROVIDER NOTES
" EMERGENCY DEPARTMENT ENCOUNTER    CHIEF COMPLAINT  Chief Complaint: Flank pain  History given by: Patient  History limited by: None  Room Number:   PMD: Mal Barboza MD      HPI:  Pt is a 79 y.o. male who presents complaining of right flank pain that began yesterday morning and progressively worsened throughout the day. Patient denies fevers, but reports \"I woke up clammy.\" Patient reports associated urinary urgency, and nausea without vomiting. Patient reports hx of kidney stones 40 years ago.    Duration/Onset/Timin last night/gradual/constant  Location: right flank  Radiation: none  Quality: pain  Intensity/Severity: moderate  Associated Symptoms: none  Aggravating or Alleviating Factors: none stated  Previous Episodes: Pt reports hx of kidney stones 40 years ago.      PAST MEDICAL HISTORY  Active Ambulatory Problems     Diagnosis Date Noted   • Closed right ankle fracture 2019     Resolved Ambulatory Problems     Diagnosis Date Noted   • No Resolved Ambulatory Problems     Past Medical History:   Diagnosis Date   • Ankle fracture    • Anxiety    • Constipation due to opioid therapy    • Coronary artery disease    • Diabetes mellitus (CMS/HCC)    • Gout    • Hyperlipidemia    • Hypertension    • Parkinson disease (CMS/HCC)        PAST SURGICAL HISTORY  Past Surgical History:   Procedure Laterality Date   • ANKLE OPEN REDUCTION INTERNAL FIXATION Right 2019    Procedure: OPEN REDUCTION INTERNAL FIXATION RIGHT ANKLE;  Surgeon: Evgeny Keith II, MD;  Location: [facility];  Service: Orthopedics   • CATARACT EXTRACTION EXTRACAPSULAR W/ INTRAOCULAR LENS IMPLANTATION Bilateral    • COLONOSCOPY     • CORONARY ANGIOPLASTY WITH STENT PLACEMENT     • EXTRACORPOREAL SHOCK WAVE LITHOTRIPSY (ESWL)      OVER  10 YEARS AGO   • PILONIDAL CYSTECTOMY  1964       FAMILY HISTORY  Family History   Problem Relation Age of Onset   • Malig Hyperthermia Neg Hx        SOCIAL " HISTORY  Social History     Socioeconomic History   • Marital status:      Spouse name: Not on file   • Number of children: Not on file   • Years of education: Not on file   • Highest education level: Not on file   Tobacco Use   • Smoking status: Former Smoker   Substance and Sexual Activity   • Alcohol use: Yes     Alcohol/week: 1.8 oz     Types: 3 Shots of liquor per week     Comment: rickey   • Drug use: No       ALLERGIES  Patient has no known allergies.    REVIEW OF SYSTEMS  Review of Systems   Constitutional: Negative for activity change, appetite change and fever.   HENT: Negative for congestion and sore throat.    Eyes: Negative.    Respiratory: Negative for cough and shortness of breath.    Cardiovascular: Negative for chest pain and leg swelling.   Gastrointestinal: Negative for abdominal pain, diarrhea and vomiting.   Endocrine: Negative.    Genitourinary: Positive for flank pain. Negative for decreased urine volume and dysuria.   Musculoskeletal: Negative for neck pain.   Skin: Negative for rash and wound.   Allergic/Immunologic: Negative.    Neurological: Negative for weakness, numbness and headaches.   Hematological: Negative.    Psychiatric/Behavioral: Negative.    All other systems reviewed and are negative.      PHYSICAL EXAM  ED Triage Vitals [11/19/19 0132]   Temp Heart Rate Resp BP SpO2   98.4 °F (36.9 °C) 72 16 175/67 97 %      Temp src Heart Rate Source Patient Position BP Location FiO2 (%)   Tympanic -- -- -- --       Physical Exam   Constitutional: He is oriented to person, place, and time. No distress.   HENT:   Head: Normocephalic and atraumatic.   Eyes: EOM are normal. Pupils are equal, round, and reactive to light.   Neck: Normal range of motion. Neck supple.   Cardiovascular: Normal rate, regular rhythm and normal heart sounds.   Pulmonary/Chest: Effort normal and breath sounds normal. No respiratory distress.   Abdominal: Soft. There is no tenderness. There is no rebound and no  guarding.   Musculoskeletal: Normal range of motion. He exhibits no edema.   Neurological: He is alert and oriented to person, place, and time. He has normal sensation and normal strength.   Skin: Skin is warm and dry.   Psychiatric: Mood and affect normal.   Nursing note and vitals reviewed.      LAB RESULTS  Lab Results (last 24 hours)     Procedure Component Value Units Date/Time    Urinalysis With Microscopic If Indicated (No Culture) - Urine, Clean Catch [[id number]]  (Abnormal) Collected:  11/19/19 0222    Specimen:  Urine, Clean Catch Updated:  11/19/19 0307     Color, UA Yellow     Appearance, UA Cloudy     pH, UA <=5.0     Specific Gravity, UA 1.026     Glucose,  mg/dL (Trace)     Ketones, UA Trace     Bilirubin, UA Negative     Blood, UA Large (3+)     Protein, UA >=300 mg/dL (3+)     Leuk Esterase, UA Negative     Nitrite, UA Negative     Urobilinogen, UA 1.0 E.U./dL    Urinalysis, Microscopic Only - Urine, Clean Catch [[id number]]  (Abnormal) Collected:  11/19/19 0222    Specimen:  Urine, Clean Catch Updated:  11/19/19 0307     RBC, UA Too Numerous to Count /HPF      WBC, UA 3-5 /HPF      Bacteria, UA None Seen /HPF      Squamous Epithelial Cells, UA 0-2 /HPF      Hyaline Casts, UA 0-2 /LPF      Methodology Automated Microscopy    CBC & Differential [[id number]] Collected:  11/19/19 0223    Specimen:  Blood Updated:  11/19/19 0257    Narrative:       The following orders were created for panel order CBC & Differential.  Procedure                               Abnormality         Status                     ---------                               -----------         ------                     CBC Auto Differential[[id number]]        Abnormal            Final result                 Please view results for these tests on the individual orders.    Comprehensive Metabolic Panel [[id number]]  (Abnormal) Collected:  11/19/19 0223    Specimen:  Blood Updated:  11/19/19 0334     Glucose 206 mg/dL      BUN 24  mg/dL      Creatinine 1.33 mg/dL      Sodium 141 mmol/L      Potassium 4.8 mmol/L      Chloride 104 mmol/L      CO2 22.6 mmol/L      Calcium 9.0 mg/dL      Total Protein 7.1 g/dL      Albumin 3.40 g/dL      ALT (SGPT) <5 U/L      AST (SGOT) 24 U/L      Comment: Specimen hemolyzed.  Results may be affected.        Alkaline Phosphatase 123 U/L      Total Bilirubin 0.7 mg/dL      eGFR Non African Amer 52 mL/min/1.73      Globulin 3.7 gm/dL      A/G Ratio 0.9 g/dL      BUN/Creatinine Ratio 18.0     Anion Gap 14.4 mmol/L     Narrative:       GFR Normal >60  Chronic Kidney Disease <60  Kidney Failure <15    Lipase [[id number]]  (Abnormal) Collected:  11/19/19 0223    Specimen:  Blood Updated:  11/19/19 0310     Lipase 69 U/L     CBC Auto Differential [[id number]]  (Abnormal) Collected:  11/19/19 0223    Specimen:  Blood Updated:  11/19/19 0257     WBC 10.37 10*3/mm3      RBC 3.79 10*6/mm3      Hemoglobin 13.0 g/dL      Hematocrit 36.8 %      MCV 97.1 fL      MCH 34.3 pg      MCHC 35.3 g/dL      RDW 13.6 %      RDW-SD 47.1 fl      MPV 10.0 fL      Platelets 301 10*3/mm3      Neutrophil % 85.5 %      Lymphocyte % 8.1 %      Monocyte % 5.2 %      Eosinophil % 0.3 %      Basophil % 0.3 %      Immature Grans % 0.6 %      Neutrophils, Absolute 8.87 10*3/mm3      Lymphocytes, Absolute 0.84 10*3/mm3      Monocytes, Absolute 0.54 10*3/mm3      Eosinophils, Absolute 0.03 10*3/mm3      Basophils, Absolute 0.03 10*3/mm3      Immature Grans, Absolute 0.06 10*3/mm3      nRBC 0.0 /100 WBC           I ordered the above labs and reviewed the results    RADIOLOGY  CT Abdomen Pelvis Without Contrast   Final Result   1.  Right nephrolithiasis including a 3 mm mid ureteral calculus with   minimal hydronephrosis.   2. Uncomplicated cholelithiasis.   3. Mild colonic diverticulosis.   4. Right inguinal hernia as discussed without inflammation                   This report was finalized on 11/19/2019 5:33 AM by Sumit Klein M.D.               I  ordered the above noted radiological studies. Interpreted by radiologist. Reviewed by me in PACS.       PROCEDURES  Procedures      PROGRESS AND CONSULTS     0422 CT abdomen ordered for evaluation.    0421 Ordered Zofran and Dilaudid for treatment.    0504 Rechecked the patient. BP- 175/67 HR- 72 Temp- 98.4 °F (36.9 °C) (Tympanic) O2 sat- 97% Patient reports his pain has improved from a 10/10 to a 4/10 since receiving the Dilaudid. Discussed potential for kidney stone based on review of CT. Will confirm with radiology read. Discussed the plan to continue pain control. Will determine disposition after CT results are finalized. Pt understands and agrees with the plan, all questions answered.    0509 reviewed patient's case with Dr. Solorio, ER physician. After bedside evaluation, Dr. Solorio agrees with the plan of care.         MEDICAL DECISION MAKING  Results were reviewed/discussed with the patient and they were also made aware of online access. Pt also made aware that some labs, such as cultures, will not be resulted during ER visit and follow up with PMD is necessary.     MDM  Number of Diagnoses or Management Options     Amount and/or Complexity of Data Reviewed  Clinical lab tests: ordered and reviewed  Tests in the radiology section of CPT®: ordered and reviewed  Decide to obtain previous medical records or to obtain history from someone other than the patient: yes  Review and summarize past medical records: yes  Discuss the patient with other providers: yes (Dr. Lyndsey MD)  Independent visualization of images, tracings, or specimens: yes    Patient Progress  Patient progress: stable         DIAGNOSIS  Final diagnoses:   Right ureteral stone       DISPOSITION  DISCHARGE    Patient discharged in stable condition.    Reviewed implications of results, diagnosis, meds, responsibility to follow up, warning signs and symptoms of possible worsening, potential complications and reasons to return to  ER.    Patient/Family voiced understanding of above instructions.    Discussed plan for discharge, as there is no emergent indication for admission. Patient referred to primary care provider for BP management due to today's BP. Pt/family is agreeable and understands need for follow up and repeat testing.  Pt is aware that discharge does not mean that nothing is wrong but it indicates no emergency is present that requires admission and they must continue care with follow-up as given below or physician of their choice.     FOLLOW-UP  Tashi Zavala MD  [address] [place] [address]  [phone]    Schedule an appointment as soon as possible for a visit   For further evaluation and treatment         Medication List      New Prescriptions    ondansetron ODT 4 MG disintegrating tablet  Commonly known as:  ZOFRAN ODT  Take 1 tablet by mouth Every 8 (Eight) Hours As Needed for Nausea or   Vomiting.     tamsulosin 0.4 MG capsule 24 hr capsule  Commonly known as:  FLOMAX  Take 1 capsule by mouth Daily.        Changed    HYDROcodone-acetaminophen 5-325 MG per tablet  Commonly known as:  NORCO  Take 1 tablet by mouth Every 6 (Six) Hours As Needed for Severe Pain .  What changed:    when to take this  Another medication with the same name was removed. Continue taking this   medication, and follow the directions you see here.              Latest Documented Vital Signs:  As of 6:05 AM  BP- 155/88 HR- 78 Temp- 98.4 °F (36.9 °C) (Tympanic) O2 sat- 94%    --  Documentation assistance provided by nicolasa Perez for Luis Miguel Valverde PA-C.  Information recorded by the scribe was done at my direction and has been verified and validated by me.       Nilsa Perez  11/19/19 0517       Luis Miguel Valverde III, PA  11/19/19 0605

## 2019-11-19 NOTE — ED PROVIDER NOTES
Pt presents to ED c/o R sided flank pain that began yesterday. Pt also c/o urinary frequency and nausea. Hx of kidney stones.    Upon exam, pt is A&O x3 and has no abd or CVA tenderness.    Discussed with pt plan to obtain results of pt CT abd/pel prior to disposition. Pt understands and agrees with the plan, all questions answered.    MD ATTESTATION NOTE    The MARIA TERESA and I have discussed this patient's history, physical exam, and treatment plan.  I have reviewed the documentation and personally had a face to face interaction with the patient. I affirm the documentation and agree with the treatment and plan.  The attached note describes my personal findings.    Documentation assistance provided by nicolasa Moss for Dr. Solorio.  Information recorded by the scribe was done at my direction and has been verified and validated by me.     Shiloh Moss  11/19/19 9813       Rangel Solorio MD  11/19/19 4715

## 2019-11-19 NOTE — DISCHARGE INSTRUCTIONS
Call and follow up with the urologist above.  Use a urine strainer in an attempt to catch the stone for analyzation.  Return to the ER should you develop a fever or should the pain become worse and not be controlled with the prescribed medication.

## 2020-01-01 ENCOUNTER — HOSPITAL ENCOUNTER (OUTPATIENT)
Dept: CARDIOLOGY | Facility: HOSPITAL | Age: 80
Discharge: HOME | End: 2020-07-15
Admitting: INTERNAL MEDICINE

## 2020-01-01 ENCOUNTER — HOSPITAL ENCOUNTER (OUTPATIENT)
Dept: PHYSICAL THERAPY | Facility: HOSPITAL | Age: 80
Setting detail: THERAPIES SERIES
Discharge: HOME | End: 2020-02-21

## 2020-01-01 ENCOUNTER — HOSPITAL ENCOUNTER (OUTPATIENT)
Dept: GENERAL RADIOLOGY | Facility: HOSPITAL | Age: 80
Discharge: HOME | End: 2020-07-02
Admitting: INTERNAL MEDICINE

## 2020-01-01 ENCOUNTER — HOSPITAL ENCOUNTER (OUTPATIENT)
Dept: PHYSICAL THERAPY | Facility: HOSPITAL | Age: 80
Setting detail: THERAPIES SERIES
Discharge: HOME | End: 2020-02-28

## 2020-01-01 ENCOUNTER — TRANSCRIBE ORDERS (OUTPATIENT)
Dept: ADMINISTRATIVE | Facility: HOSPITAL | Age: 80
End: 2020-01-01

## 2020-01-01 VITALS
SYSTOLIC BLOOD PRESSURE: 130 MMHG | BODY MASS INDEX: 24.25 KG/M2 | HEIGHT: 68 IN | DIASTOLIC BLOOD PRESSURE: 80 MMHG | OXYGEN SATURATION: 97 % | WEIGHT: 160 LBS | HEART RATE: 77 BPM

## 2020-01-01 DIAGNOSIS — R26.2 DIFFICULTY WALKING: ICD-10-CM

## 2020-01-01 DIAGNOSIS — I25.10 DISEASE OF CARDIOVASCULAR SYSTEM: ICD-10-CM

## 2020-01-01 DIAGNOSIS — Z98.890 STATUS POST ORIF OF FRACTURE OF ANKLE: ICD-10-CM

## 2020-01-01 DIAGNOSIS — Z87.81 HISTORY OF FIBULA FRACTURE: Primary | ICD-10-CM

## 2020-01-01 DIAGNOSIS — R06.02 SOB (SHORTNESS OF BREATH): ICD-10-CM

## 2020-01-01 DIAGNOSIS — Z87.81 STATUS POST ORIF OF FRACTURE OF ANKLE: ICD-10-CM

## 2020-01-01 DIAGNOSIS — M25.571 RIGHT ANKLE PAIN, UNSPECIFIED CHRONICITY: ICD-10-CM

## 2020-01-01 DIAGNOSIS — I25.10 DISEASE OF CARDIOVASCULAR SYSTEM: Primary | ICD-10-CM

## 2020-01-01 LAB
ASCENDING AORTA: 3.1 CM
BH CV ECHO MEAS - ACS: 1.9 CM
BH CV ECHO MEAS - AO MAX PG: 11.9 MMHG
BH CV ECHO MEAS - AO ROOT AREA (BSA CORRECTED): 1.6
BH CV ECHO MEAS - AO ROOT AREA: 7.4 CM^2
BH CV ECHO MEAS - AO ROOT DIAM: 3.1 CM
BH CV ECHO MEAS - AO V2 MAX: 172.4 CM/SEC
BH CV ECHO MEAS - ASC AORTA: 3.1 CM
BH CV ECHO MEAS - BSA(HAYCOCK): 1.9 M^2
BH CV ECHO MEAS - BSA: 1.9 M^2
BH CV ECHO MEAS - BZI_BMI: 24.3 KILOGRAMS/M^2
BH CV ECHO MEAS - BZI_METRIC_HEIGHT: 172.7 CM
BH CV ECHO MEAS - BZI_METRIC_WEIGHT: 72.6 KG
BH CV ECHO MEAS - EDV(MOD-SP2): 91 ML
BH CV ECHO MEAS - EDV(MOD-SP4): 76 ML
BH CV ECHO MEAS - EDV(TEICH): 104.1 ML
BH CV ECHO MEAS - EF(CUBED): 66.4 %
BH CV ECHO MEAS - EF(MOD-BP): 66 %
BH CV ECHO MEAS - EF(MOD-SP2): 83.5 %
BH CV ECHO MEAS - EF(MOD-SP4): 75 %
BH CV ECHO MEAS - EF(TEICH): 57.8 %
BH CV ECHO MEAS - ESV(MOD-SP2): 15 ML
BH CV ECHO MEAS - ESV(MOD-SP4): 19 ML
BH CV ECHO MEAS - ESV(TEICH): 43.9 ML
BH CV ECHO MEAS - FS: 30.5 %
BH CV ECHO MEAS - IVS/LVPW: 0.96
BH CV ECHO MEAS - IVSD: 0.95 CM
BH CV ECHO MEAS - LAT PEAK E' VEL: 8.8 CM/SEC
BH CV ECHO MEAS - LV DIASTOLIC VOL/BSA (35-75): 40.9 ML/M^2
BH CV ECHO MEAS - LV MASS(C)D: 158.9 GRAMS
BH CV ECHO MEAS - LV MASS(C)DI: 85.5 GRAMS/M^2
BH CV ECHO MEAS - LV SYSTOLIC VOL/BSA (12-30): 10.2 ML/M^2
BH CV ECHO MEAS - LVIDD: 4.7 CM
BH CV ECHO MEAS - LVIDS: 3.3 CM
BH CV ECHO MEAS - LVLD AP2: 7.2 CM
BH CV ECHO MEAS - LVLD AP4: 6.7 CM
BH CV ECHO MEAS - LVLS AP2: 5.7 CM
BH CV ECHO MEAS - LVLS AP4: 5.9 CM
BH CV ECHO MEAS - LVPWD: 0.99 CM
BH CV ECHO MEAS - MED PEAK E' VEL: 8.4 CM/SEC
BH CV ECHO MEAS - MV A DUR: 0.1 SEC
BH CV ECHO MEAS - MV A MAX VEL: 47 CM/SEC
BH CV ECHO MEAS - MV DEC SLOPE: 480.2 CM/SEC^2
BH CV ECHO MEAS - MV DEC TIME: 0.19 SEC
BH CV ECHO MEAS - MV E MAX VEL: 86.4 CM/SEC
BH CV ECHO MEAS - MV E/A: 1.8
BH CV ECHO MEAS - MV P1/2T MAX VEL: 100.4 CM/SEC
BH CV ECHO MEAS - MV P1/2T: 61.3 MSEC
BH CV ECHO MEAS - MVA P1/2T LCG: 2.2 CM^2
BH CV ECHO MEAS - MVA(P1/2T): 3.6 CM^2
BH CV ECHO MEAS - PULM A REVS DUR: 0.09 SEC
BH CV ECHO MEAS - PULM A REVS VEL: 25.7 CM/SEC
BH CV ECHO MEAS - PULM DIAS VEL: 70.3 CM/SEC
BH CV ECHO MEAS - PULM S/D: 1
BH CV ECHO MEAS - PULM SYS VEL: 72.4 CM/SEC
BH CV ECHO MEAS - RAP SYSTOLE: 3 MMHG
BH CV ECHO MEAS - RVSP: 42.3 MMHG
BH CV ECHO MEAS - SI(CUBED): 37.9 ML/M^2
BH CV ECHO MEAS - SI(MOD-SP2): 40.9 ML/M^2
BH CV ECHO MEAS - SI(MOD-SP4): 30.7 ML/M^2
BH CV ECHO MEAS - SI(TEICH): 32.4 ML/M^2
BH CV ECHO MEAS - SV(CUBED): 70.4 ML
BH CV ECHO MEAS - SV(MOD-SP2): 76 ML
BH CV ECHO MEAS - SV(MOD-SP4): 57 ML
BH CV ECHO MEAS - SV(TEICH): 60.2 ML
BH CV ECHO MEAS - TAPSE (>1.6): 2.3 CM2
BH CV ECHO MEAS - TR MAX VEL: 313.6 CM/SEC
BH CV ECHO MEASUREMENTS AVERAGE E/E' RATIO: 10.05
BH CV STRESS BP STAGE 1: NORMAL
BH CV STRESS DURATION MIN STAGE 1: 3
BH CV STRESS DURATION SEC STAGE 1: 18
BH CV STRESS ECHO POST STRESS EJECTION FRACTION EF: 80 %
BH CV STRESS GRADE STAGE 1: 10
BH CV STRESS HR STAGE 1: 122
BH CV STRESS METS STAGE 1: 5
BH CV STRESS PROTOCOL 1: NORMAL
BH CV STRESS SPEED STAGE 1: 1.7
BH CV STRESS STAGE 1: 1
BH CV XLRA - RV BASE: 3.4 CM
BH CV XLRA - RV LENGTH: 8.2 CM
BH CV XLRA - RV MID: 3.5 CM
BH CV XLRA - TDI S': 14 CM/SEC
LEFT ATRIUM VOLUME INDEX: 36 ML/M2
MAXIMAL PREDICTED HEART RATE: 143 BPM
PERCENT MAX PREDICTED HR: 85.31 %
SINUS: 2.8 CM
STJ: 3 CM
STRESS BASELINE BP: NORMAL MMHG
STRESS BASELINE HR: 77 BPM
STRESS PERCENT HR: 100 %
STRESS POST ESTIMATED WORKLOAD: 4 METS
STRESS POST EXERCISE DUR MIN: 3 MIN
STRESS POST EXERCISE DUR SEC: 18 SEC
STRESS POST PEAK BP: NORMAL MMHG
STRESS POST PEAK HR: 122 BPM
STRESS TARGET HR: 122 BPM

## 2020-01-01 PROCEDURE — 93320 DOPPLER ECHO COMPLETE: CPT | Performed by: INTERNAL MEDICINE

## 2020-01-01 PROCEDURE — 97110 THERAPEUTIC EXERCISES: CPT

## 2020-01-01 PROCEDURE — 97112 NEUROMUSCULAR REEDUCATION: CPT

## 2020-01-01 PROCEDURE — 93320 DOPPLER ECHO COMPLETE: CPT

## 2020-01-01 PROCEDURE — 97116 GAIT TRAINING THERAPY: CPT

## 2020-01-01 PROCEDURE — 93017 CV STRESS TEST TRACING ONLY: CPT

## 2020-01-01 PROCEDURE — 93350 STRESS TTE ONLY: CPT

## 2020-01-01 PROCEDURE — 93350 STRESS TTE ONLY: CPT | Performed by: INTERNAL MEDICINE

## 2020-01-01 PROCEDURE — 93325 DOPPLER ECHO COLOR FLOW MAPG: CPT | Performed by: INTERNAL MEDICINE

## 2020-01-01 PROCEDURE — 93325 DOPPLER ECHO COLOR FLOW MAPG: CPT

## 2020-01-01 PROCEDURE — 93352 ADMIN ECG CONTRAST AGENT: CPT | Performed by: INTERNAL MEDICINE

## 2020-01-01 PROCEDURE — 93016 CV STRESS TEST SUPVJ ONLY: CPT | Performed by: INTERNAL MEDICINE

## 2020-01-01 PROCEDURE — 71046 X-RAY EXAM CHEST 2 VIEWS: CPT

## 2020-01-01 PROCEDURE — 25010000002 PERFLUTREN (DEFINITY) 8.476 MG IN SODIUM CHLORIDE 0.9 % 10 ML INJECTION: Performed by: INTERNAL MEDICINE

## 2020-01-01 PROCEDURE — 93018 CV STRESS TEST I&R ONLY: CPT | Performed by: INTERNAL MEDICINE

## 2020-01-01 RX ADMIN — PERFLUTREN 3 ML: 6.52 INJECTION, SUSPENSION INTRAVENOUS at 09:37

## 2020-01-08 ENCOUNTER — TRANSCRIBE ORDERS (OUTPATIENT)
Dept: PHYSICAL THERAPY | Facility: HOSPITAL | Age: 80
End: 2020-01-08

## 2020-01-08 DIAGNOSIS — Z87.81 HISTORY OF FIBULA FRACTURE: Primary | ICD-10-CM

## 2020-01-15 ENCOUNTER — HOSPITAL ENCOUNTER (OUTPATIENT)
Dept: PHYSICAL THERAPY | Facility: HOSPITAL | Age: 80
Setting detail: THERAPIES SERIES
Discharge: HOME | End: 2020-01-15

## 2020-01-15 DIAGNOSIS — Z87.81 STATUS POST ORIF OF FRACTURE OF ANKLE: ICD-10-CM

## 2020-01-15 DIAGNOSIS — Z87.81 HISTORY OF FIBULA FRACTURE: Primary | ICD-10-CM

## 2020-01-15 DIAGNOSIS — R26.2 DIFFICULTY WALKING: ICD-10-CM

## 2020-01-15 DIAGNOSIS — M25.571 RIGHT ANKLE PAIN, UNSPECIFIED CHRONICITY: ICD-10-CM

## 2020-01-15 DIAGNOSIS — Z98.890 STATUS POST ORIF OF FRACTURE OF ANKLE: ICD-10-CM

## 2020-01-15 PROCEDURE — 97110 THERAPEUTIC EXERCISES: CPT | Performed by: PHYSICAL THERAPIST

## 2020-01-15 PROCEDURE — 97161 PT EVAL LOW COMPLEX 20 MIN: CPT | Performed by: PHYSICAL THERAPIST

## 2020-01-15 NOTE — THERAPY EVALUATION
Outpatient Physical Therapy Ortho Initial Evaluation  [facility]     Patient Name: Dustin Villeda  : 1940  MRN: [id number]  Today's Date: 1/15/2020      Visit Date: 01/15/2020    Patient Active Problem List   Diagnosis   • Closed right ankle fracture        Past Medical History:   Diagnosis Date   • Ankle fracture     RIGHT ANKLE    • Anxiety     ABOUT CURRENT HEALTH SITUATION    • Constipation due to opioid therapy     NO BOWEL MOVEMENT IN 5 DAYS   • Coronary artery disease    • Diabetes mellitus (CMS/HCC)    • Gout     BIG TOES   • Hyperlipidemia    • Hypertension    • Parkinson disease (CMS/HCC)         Past Surgical History:   Procedure Laterality Date   • ANKLE OPEN REDUCTION INTERNAL FIXATION Right 2019    Procedure: OPEN REDUCTION INTERNAL FIXATION RIGHT ANKLE;  Surgeon: Evgeny Keith II, MD;  Location: [facility];  Service: Orthopedics   • CATARACT EXTRACTION EXTRACAPSULAR W/ INTRAOCULAR LENS IMPLANTATION Bilateral    • COLONOSCOPY     • CORONARY ANGIOPLASTY WITH STENT PLACEMENT     • EXTRACORPOREAL SHOCK WAVE LITHOTRIPSY (ESWL)      OVER  10 YEARS AGO   • PILONIDAL CYSTECTOMY         Visit Dx:     ICD-10-CM ICD-9-CM   1. History of fibula fracture Z87.81 V15.51   2. Status post ORIF of fracture of ankle Z98.890 V45.89    Z87.81 V15.51   3. Right ankle pain, unspecified chronicity M25.571 719.47   4. Difficulty walking R26.2 719.7         Patient History     Row Name 01/15/20 1015             History    Chief Complaint  Difficulty Walking;Difficulty with daily activities  -JS      Date Current Problem(s) Began  20  -JS      Brief Description of Current Complaint  Patient reports standing at commode athome on 19, when moved foot (shoe was untied), he came out of shoe. Patient reached down to get shoe & fell. Patient seen in ER with XRay revealing R fibular fracture. Patient s/p R ankle ORIF on 19.  Patient went to rehab then developed kidney  stones & returned to hospital, then ultimately returned to rehab for 1 month.  Patient returned home to 3 story home (bath/bedroom on 2nd floor, 6 steps to enter).  Patient NWB until 1 week ago (used wheelchair, scooted up/down stairs).  Seen by Dr. Keith last week, cleared to WBAT R ankle in boot, therefore patient has been ambulating x 1 week with R boot & cane with tripod tip. Patient has history of Parkinsons & Diabetes.  Patient has fallen 2-3 times in the past year, due to loss of balance.    -JS      Patient/Caregiver Goals  Improve mobility Wife notes pt was shuffling prior to fracture  -JS      Occupation/sports/leisure activities  Retired.   -JS      What clinical tests have you had for this problem?  X-ray  -JS      Surgery/Hospitalization  11/12/19 R ankle ORIF  -JS         Pain     Pain Location  Ankle  -JS      Pain at Present  0  -JS      Pain at Best  0  -JS      Pain at Worst  3  -JS      What Performance Factors Make the Current Problem(s) WORSE?  Walking, certain movements  -JS      What Performance Factors Make the Current Problem(s) BETTER?  Rest  -JS      Is your sleep disturbed?  Yes  -JS      Difficulties with ADL's?  Walking, stairs, rising after prolonged sitting  -JS         Fall Risk Assessment    Any falls in the past year:  Yes  -JS      Number of falls reported in the last 12 months  3  -JS      Factors that contributed to the fall:  Tripped  -JS         Daily Activities    Primary Language  English  -JS      How does patient learn best?  Listening;Reading;Demonstration  -JS      Pt Participated in POC and Goals  Yes  -JS         Safety    Are you being hurt, hit, or frightened by anyone at home or in your life?  No  -JS      Are you being neglected by a caregiver  No  -JS        User Key  (r) = Recorded By, (t) = Taken By, (c) = Cosigned By    Initials Name Provider Type    Annamarie Griffiths PT Physical Therapist          PT Ortho     Row Name 01/15/20 1015       Subjective Comments     Subjective Comments  S/p R ankle fibular fracture. Cleared for WBAT 1 week ago, currently using cane & boot for ambulation  -JS       Subjective Pain    Able to rate subjective pain?  yes  -JS    Pre-Treatment Pain Level  0  -JS       Posture/Observations    Posture/Observations Comments  Stands with forward, rounded shoulders, flexed posture. Boot R foot.  -JS       General ROM    RT Lower Ext  Rt Ankle Dorsiflexion;Rt Ankle Plantarflexion;Rt Ankle Inversion;Rt Ankle Eversion  -JS    LT Lower Ext  Lt Ankle Dorsiflexion;Lt Ankle Plantarflexion;Lt Ankle Inversion;Lt Ankle Eversion  -JS       Right Lower Ext    Rt Ankle Dorsiflexion AROM  1 deg  -JS    Rt Ankle Plantarflexion AROM  23 deg  -JS    Rt Ankle Inversion AROM  18 deg  -JS    Rt Ankle Eversion AROM  8 deg  -JS       Left Lower Ext    Lt Ankle Dorsiflexion AROM  3 deg  -JS    Lt Ankle Plantarflexion AROM  30 deg  -JS    Lt Ankle Inversion AROM  28 deg  -JS    Lt Ankle Eversion AROM  25 deg  -JS       MMT (Manual Muscle Testing)    Rt Lower Ext  Rt Knee WNL;Rt Ankle Plantarflexion;Rt Ankle Dorsiflexion;Rt Ankle Subtalar Inversion;Rt Ankle Subtalar Eversion  -JS    Lt Lower Ext  Lt Knee WNL;Lt Ankle WNL  -JS       MMT Right Lower Ext    Rt Ankle Plantarflexion MMT, Gross Movement  (4+/5) good plus  -JS    Rt Ankle Dorsiflexion MMT, Gross Movement  (4+/5) good plus  -JS    Rt Ankle Subtalar Inversion MT, Gross Movement  (4/5) good  -JS    Rt Ankle Subtalar Eversion MMT, Gross Movement  (3+/5) fair plus  -JS    Rt Lower Extremity Comments   pain with inversion/eversion  -JS       Girth    Girth Measured?  Ankle  -JS       Ankle Girth    Figure 8- Right  55 cm  -JS    Figure 8- Left  50 cm  -JS       Gait/Stairs Assessment/Training    [place] Level (Stairs)  independent  -JS    Assistive Device (Stairs)  other (see comments) cane with tripod base  -JS    Handrail Location (Stairs)  right side (ascending)  -JS    Number of Steps (Stairs)  4  -JS    Ascending  Technique (Stairs)  step-to-step  -JS    Descending Technique (Stairs)  step-to-step  -JS    Comment (Gait/Stairs)  Amb with bilateral out-toeing, forward flexed trunk, decreased step length B. Arrived amb with cane in R hand, v/c's to change to L hand.  -JS      User Key  (r) = Recorded By, (t) = Taken By, (c) = Cosigned By    Initials Name Provider Type    Annamarie Griffiths, PT Physical Therapist                      Therapy Education  Education Details: Role of outpatient PT, POC, differential diagnosis, initial HEP, expectations.  Instruction in gait training with proper use of cane to off-load R LE, discussed use of elevation/ice R ankle to address swelling  Given: HEP, Symptoms/condition management, Posture/body mechanics, Mobility training  Program: New  How Provided: Verbal, Demonstration, Written  Provided to: Patient  Level of Understanding: Teach back education performed, Verbalized, Demonstrated     PT OP Goals     Row Name 01/15/20 1015          PT Short Term Goals    STG Date to Achieve  01/29/20  -JS     STG 1  Patient will be independent in initial HEP for ROM, strengthening and initial proprioception.  -JS     STG 2  Patient will be instructed in swelling management, reporting compliance with techniques.   -JS     STG 3  Patient will demonstrate proper gait pattern with use of boot & cane, demonstrating no LOB with ambulation in clinic.  -JS        Long Term Goals    LTG Date to Achieve  02/26/20  -JS     LTG 1  Patient will be independent with comprehensive HEP to allow continued exercise independently.  -JS     LTG 2  Patient will report improvement in function as indicated by improved LEFS score from 30/80 to 45/80 or better   -JS     LTG 3  Patient will report no falls since initiation of physical therapy  -JS     LTG 4  Patient will restore R ankle ROM, flexibilty and LE muscle strength to restore gait on even surfaces without boot with minimal deviations without pain using cane as indicated for  safety.  -JS     LTG 5  Patient will restore R ankle ROM, flexibilty and LE musculature to allow reciprocal stair-climbing without pain  -JS        Time Calculation    PT Goal Re-Cert Due Date  04/15/20  -JS       User Key  (r) = Recorded By, (t) = Taken By, (c) = Cosigned By    Initials Name Provider Type    Annamarie Griffiths, PT Physical Therapist          PT Assessment/Plan     Row Name 01/15/20 1015          PT Assessment    Functional Limitations  Decreased safety during functional activities;Impaired gait;Limitations in functional capacity and performance;Limitation in home management  -JS     Impairments  Balance;Gait;Impaired flexibility;Muscle strength;Pain;Posture;Range of motion  -JS     Assessment Comments  Dustin Villeda is a 79 y.o. year-old male referred to physical therapy for R ankle, s/p fibular fracture on 11/7/19 after patient fell at home with R ankle ORIF on 11/12/19. He presents with a evolving clinical presentation. Patient had been NWB until 1 week ago when patient was cleared to be WBAT in boot.  Patient currently ambulating with boot & cane with tripod base.  He has comorbidities of diabetes & Parkinson's  and personal factors including history of falls (2-3x this year prior to recent episode) and reported abnormal gait prior to incident with wife noted patient tended to shuffle feet due to Parkinson's that may affect his progress in the plan of care.  Signs and symptoms are consistent with referring diagnosis.  Patient will benefit from skilled physical therapy to address impairments and progress toward plan of care.  -JS     Please refer to paper survey for additional self-reported information  Yes  -JS     Rehab Potential  Good  -JS     Patient/caregiver participated in establishment of treatment plan and goals  Yes  -JS     Patient would benefit from skilled therapy intervention  Yes  -JS        PT Plan    PT Frequency  2x/week  -JS     Predicted Duration of Therapy Intervention (Therapy  Eval)  6 weeks  -JS     Planned CPT's?  PT EVAL LOW COMPLEXITY: 86353  -JS     Physical Therapy Interventions (Optional Details)  balance training;home exercise program;patient/family education;joint mobilization;ROM (Range of Motion);manual therapy techniques;modalities;stair training;strengthening;stretching;gait training;neuromuscular re-education  -JS     PT Plan Comments  Consider review of initial HEP & gait training with boot/cane, seated BAPs R ankle, seated toe curls, manual therapy to address R ankle ROM.  Ice/elevation following treatment as indicated to address swelling.  -JS       User Key  (r) = Recorded By, (t) = Taken By, (c) = Cosigned By    Initials Name Provider Type    Annamarie Griffiths PT Physical Therapist            OP Exercises     Row Name 01/15/20 1015             Subjective Comments    Subjective Comments  S/p R ankle fibular fracture. Cleared for WBAT 1 week ago, currently using cane & boot for ambulation  -JS         Subjective Pain    Able to rate subjective pain?  yes  -JS      Pre-Treatment Pain Level  0  -JS         Total Minutes    91193 - PT Therapeutic Exercise Minutes  10  -JS         Exercise 1    Exercise Name 1  Ankle DF/PF  -JS      Cueing 1  Verbal;Demo  -JS      Reps 1  10  -JS         Exercise 2    Exercise Name 2  Ankle circles  -JS      Cueing 2  Verbal;Demo  -JS      Reps 2  10  -JS         Exercise 3    Exercise Name 3  Ankle alphabet  -JS      Cueing 3  Verbal;Demo  -JS      Reps 3  1  -JS         Exercise 4    Exercise Name 4  Ankle theraband ex x 4 ways  -JS      Cueing 4  Verbal;Demo  -JS      Reps 4  10  -JS      Additional Comments  YTB  -JS         Exercise 5    Exercise Name 5  Initial gait training with boot/cane  -JS      Cueing 5  Verbal cues to change cane to L hand to help off-load R LE  -JS        User Key  (r) = Recorded By, (t) = Taken By, (c) = Cosigned By    Initials Name Provider Type    Annamarie Griffiths, SORIN Physical Therapist                         Outcome Measure Options: Lower Extremity Functional Scale (LEFS)  Lower Extremity Functional Index  Any of your usual work, housework or school activities: Moderate difficulty  Your usual hobbies, recreational or sporting activities: Moderate difficulty  Getting into or out of the bath: A little bit of difficulty  Walking between rooms: A little bit of difficulty  Putting on your shoes or socks: Moderate difficulty  Squatting: Moderate difficulty  Lifting an object, like a bag of groceries from the floor: Moderate difficulty  Performing light activities around your home: Moderate difficulty  Performing heavy activities around your home: Quite a bit of difficulty  Getting into or out of a car: Moderate difficulty  Walking 2 blocks: Quite a bit of difficulty  Walking a mile: Extreme difficulty or unable to perform activity  Going up or down 10 stairs (about 1 flight of stairs): Moderate difficulty  Standing for 1 hour: Extreme difficulty or unable to perform activity  Sitting for 1 hour: No difficulty  Running on even ground: Extreme difficulty or unable to perform activity  Running on uneven ground: Extreme difficulty or unable to perform activity  Making sharp turns while running fast: Extreme difficulty or unable to perform activity  Hopping: Extreme difficulty or unable to perform activity  Rolling over in bed: Moderate difficulty  Total: 30      Time Calculation:     Start Time: 1015  Stop Time: 1100  Time Calculation (min): 45 min     Therapy Charges for Today     Code Description Service Date Service Provider Modifiers Qty    43445449828 HC PT THER PROC EA 15 MIN 1/15/2020 Annamarie Ackerman, PT GP 1    48614572146 HC PT EVAL LOW COMPLEXITY 2 1/15/2020 Annamarie Ackerman, PT GP 1          PT G-Codes  Outcome Measure Options: Lower Extremity Functional Scale (LEFS)  Total: 30         Annamarie Ackerman PT  1/15/2020

## 2020-01-20 ENCOUNTER — HOSPITAL ENCOUNTER (OUTPATIENT)
Dept: PHYSICAL THERAPY | Facility: HOSPITAL | Age: 80
Setting detail: THERAPIES SERIES
Discharge: HOME | End: 2020-01-20

## 2020-01-20 DIAGNOSIS — M25.571 RIGHT ANKLE PAIN, UNSPECIFIED CHRONICITY: ICD-10-CM

## 2020-01-20 DIAGNOSIS — R26.2 DIFFICULTY WALKING: ICD-10-CM

## 2020-01-20 DIAGNOSIS — Z87.81 HISTORY OF FIBULA FRACTURE: Primary | ICD-10-CM

## 2020-01-20 DIAGNOSIS — Z98.890 STATUS POST ORIF OF FRACTURE OF ANKLE: ICD-10-CM

## 2020-01-20 DIAGNOSIS — Z87.81 STATUS POST ORIF OF FRACTURE OF ANKLE: ICD-10-CM

## 2020-01-20 PROCEDURE — 97110 THERAPEUTIC EXERCISES: CPT

## 2020-01-20 PROCEDURE — 97140 MANUAL THERAPY 1/> REGIONS: CPT

## 2020-01-20 NOTE — THERAPY TREATMENT NOTE
Outpatient Physical Therapy Ortho Treatment Note  [facility]     Patient Name: Dustin Villeda  : 1940  MRN: [id number]  Today's Date: 2020      Visit Date: 2020    Visit Dx:    ICD-10-CM ICD-9-CM   1. History of fibula fracture Z87.81 V15.51   2. Status post ORIF of fracture of ankle Z98.890 V45.89    Z87.81 V15.51   3. Right ankle pain, unspecified chronicity M25.571 719.47   4. Difficulty walking R26.2 719.7       Patient Active Problem List   Diagnosis   • Closed right ankle fracture        Past Medical History:   Diagnosis Date   • Ankle fracture     RIGHT ANKLE    • Anxiety     ABOUT CURRENT HEALTH SITUATION    • Constipation due to opioid therapy     NO BOWEL MOVEMENT IN 5 DAYS   • Coronary artery disease    • Diabetes mellitus (CMS/HCC)    • Gout     BIG TOES   • Hyperlipidemia    • Hypertension    • Parkinson disease (CMS/HCC)         Past Surgical History:   Procedure Laterality Date   • ANKLE OPEN REDUCTION INTERNAL FIXATION Right 2019    Procedure: OPEN REDUCTION INTERNAL FIXATION RIGHT ANKLE;  Surgeon: Evgeny Keith II, MD;  Location: [facility];  Service: Orthopedics   • CATARACT EXTRACTION EXTRACAPSULAR W/ INTRAOCULAR LENS IMPLANTATION Bilateral    • COLONOSCOPY     • CORONARY ANGIOPLASTY WITH STENT PLACEMENT     • EXTRACORPOREAL SHOCK WAVE LITHOTRIPSY (ESWL)      OVER  10 YEARS AGO   • PILONIDAL CYSTECTOMY                         PT Assessment/Plan     Row Name 20 1400          PT Assessment    Assessment Comments  Mr. Villeda returns for first follow up visit after initial eval, reports good compliance with HEP, questions regarding boot wear/ how long. Pt reports his MD told him PT would advise. Performed manual therapy within painfree limits for improved ankle DF mobility and metatarsal mobility with good tolerance. Reviewed HEP, cues during ankle 4 way. Noted limitations with seated baps and fitter. Overall, pt continues to be a good  candidate for skilled PT to address limitations in balance, ROM, strength.  -RS        PT Plan    PT Plan Comments  Assess tolerance for therex progression, continue to progress as tolerance allows.  -RS       User Key  (r) = Recorded By, (t) = Taken By, (c) = Cosigned By    Initials Name Provider Type    RS Pam Chavez, PT Physical Therapist            OP Exercises     Row Name 01/20/20 1300             Subjective Comments    Subjective Comments  Pt reports has some questions regarding his boot and when he is allowed to go without it. Did his HEP.  -RS         Subjective Pain    Able to rate subjective pain?  yes  -RS      Pre-Treatment Pain Level  0  -RS         Total Minutes    75443 - PT Therapeutic Exercise Minutes  30  -RS      93266 - PT Manual Therapy Minutes  10  -RS         Exercise 1    Exercise Name 1  Ankle DF/PF  -RS      Cueing 1  Verbal;Demo  -RS      Reps 1  10  -RS         Exercise 2    Exercise Name 2  Ankle circles  -RS      Cueing 2  Verbal;Demo  -RS      Reps 2  10  -RS         Exercise 3    Exercise Name 3  Ankle alphabet  -RS      Cueing 3  Verbal;Demo  -RS      Reps 3  1  -RS         Exercise 4    Exercise Name 4  Ankle theraband ex x 4 ways  -RS      Cueing 4  Verbal;Demo  -RS      Reps 4  12  -RS      Additional Comments  YTB  -RS         Exercise 5    Exercise Name 5  Initial gait training with boot/cane  -RS      Cueing 5  Verbal cues to change cane to L hand to help off-load R LE  -RS      Reps 5  long hallway lap each  -RS      Additional Comments  front, back with close guard  -RS         Exercise 6    Exercise Name 6  seated BAPS  -RS      Cueing 6  Verbal;Demo  -RS      Reps 6  12ea CW/CCW  -RS         Exercise 7    Exercise Name 7  fitter board  -RS      Cueing 7  Verbal  -RS      Reps 7  15  -RS      Additional Comments  PF/DF  -RS         Exercise 8    Exercise Name 8  side steps  -RS      Cueing 8  Verbal  -RS      Reps 8  3 laps  -RS         Exercise 9    Exercise Name 9   seated heel raise for improved mobility, no pain  -RS      Cueing 9  Verbal  -RS      Reps 9  15  -RS         Exercise 10    Exercise Name 10  mini squat  -RS      Cueing 10  Verbal  -RS      Reps 10  10  -RS      Additional Comments  cues for hips back  -RS        User Key  (r) = Recorded By, (t) = Taken By, (c) = Cosigned By    Initials Name Provider Type    RS Pam Chavez, PT Physical Therapist                      Manual Rx (last 36 hours)      Manual Treatments     Row Name 01/20/20 1300             Total Minutes    64124 - PT Manual Therapy Minutes  10  -RS         Manual Rx 1    Manual Rx 1 Location  metatarsal glides AP  -RS         Manual Rx 2    Manual Rx 2 Location  slight distraction into dorsiflexion  -RS        User Key  (r) = Recorded By, (t) = Taken By, (c) = Cosigned By    Initials Name Provider Type    RS Pam Chavez, PT Physical Therapist          PT OP Goals     Row Name 01/20/20 1300          PT Short Term Goals    STG Date to Achieve  01/29/20  -RS     STG 1  Patient will be independent in initial HEP for ROM, strengthening and initial proprioception.  -RS     STG 1 Progress  Ongoing  -RS     STG 2  Patient will be instructed in swelling management, reporting compliance with techniques.   -RS     STG 2 Progress  Ongoing  -RS     STG 3  Patient will demonstrate proper gait pattern with use of boot & cane, demonstrating no LOB with ambulation in clinic.  -RS     STG 3 Progress  Ongoing  -RS        Long Term Goals    LTG Date to Achieve  02/26/20  -RS     LTG 1  Patient will be independent with comprehensive HEP to allow continued exercise independently.  -RS     LTG 1 Progress  Ongoing  -RS     LTG 2  Patient will report improvement in function as indicated by improved LEFS score from 30/80 to 45/80 or better   -RS     LTG 2 Progress  Ongoing  -RS     LTG 3  Patient will report no falls since initiation of physical therapy  -RS     LTG 3 Progress  Ongoing  -RS     LTG 4  Patient will  restore R ankle ROM, flexibilty and LE muscle strength to restore gait on even surfaces without boot with minimal deviations without pain using cane as indicated for safety.  -RS     LTG 4 Progress  Ongoing  -RS     LTG 5  Patient will restore R ankle ROM, flexibilty and LE musculature to allow reciprocal stair-climbing without pain  -RS     LTG 5 Progress  Ongoing  -RS       User Key  (r) = Recorded By, (t) = Taken By, (c) = Cosigned By    Initials Name Provider Type    RS Pam Chavez PT Physical Therapist                         Time Calculation:   Start Time: 1400  Stop Time: 1447  Time Calculation (min): 47 min  Therapy Charges for Today     Code Description Service Date Service Provider Modifiers Qty    86712676740  PT THER PROC EA 15 MIN 1/20/2020 Pam Chavez, SORIN GP 2    73392876625 HC PT MANUAL THERAPY EA 15 MIN 1/20/2020 Pam Chavez PT GP 1                    Pam Chavez PT  1/20/2020

## 2020-01-23 ENCOUNTER — HOSPITAL ENCOUNTER (OUTPATIENT)
Dept: PHYSICAL THERAPY | Facility: HOSPITAL | Age: 80
Setting detail: THERAPIES SERIES
Discharge: HOME | End: 2020-01-23

## 2020-01-23 DIAGNOSIS — Z87.81 HISTORY OF FIBULA FRACTURE: Primary | ICD-10-CM

## 2020-01-23 DIAGNOSIS — Z98.890 STATUS POST ORIF OF FRACTURE OF ANKLE: ICD-10-CM

## 2020-01-23 DIAGNOSIS — R26.2 DIFFICULTY WALKING: ICD-10-CM

## 2020-01-23 DIAGNOSIS — Z87.81 STATUS POST ORIF OF FRACTURE OF ANKLE: ICD-10-CM

## 2020-01-23 DIAGNOSIS — M25.571 RIGHT ANKLE PAIN, UNSPECIFIED CHRONICITY: ICD-10-CM

## 2020-01-23 PROCEDURE — 97110 THERAPEUTIC EXERCISES: CPT

## 2020-01-23 PROCEDURE — 97140 MANUAL THERAPY 1/> REGIONS: CPT

## 2020-01-23 NOTE — THERAPY TREATMENT NOTE
Outpatient Physical Therapy Ortho Treatment Note  [facility]     Patient Name: Dustin Villeda  : 1940  MRN: [id number]  Today's Date: 2020      Visit Date: 2020    Visit Dx:    ICD-10-CM ICD-9-CM   1. History of fibula fracture Z87.81 V15.51   2. Status post ORIF of fracture of ankle Z98.890 V45.89    Z87.81 V15.51   3. Right ankle pain, unspecified chronicity M25.571 719.47   4. Difficulty walking R26.2 719.7       Patient Active Problem List   Diagnosis   • Closed right ankle fracture        Past Medical History:   Diagnosis Date   • Ankle fracture     RIGHT ANKLE    • Anxiety     ABOUT CURRENT HEALTH SITUATION    • Constipation due to opioid therapy     NO BOWEL MOVEMENT IN 5 DAYS   • Coronary artery disease    • Diabetes mellitus (CMS/HCC)    • Gout     BIG TOES   • Hyperlipidemia    • Hypertension    • Parkinson disease (CMS/HCC)         Past Surgical History:   Procedure Laterality Date   • ANKLE OPEN REDUCTION INTERNAL FIXATION Right 2019    Procedure: OPEN REDUCTION INTERNAL FIXATION RIGHT ANKLE;  Surgeon: Evgeny Keith II, MD;  Location: [facility];  Service: Orthopedics   • CATARACT EXTRACTION EXTRACAPSULAR W/ INTRAOCULAR LENS IMPLANTATION Bilateral    • COLONOSCOPY     • CORONARY ANGIOPLASTY WITH STENT PLACEMENT     • EXTRACORPOREAL SHOCK WAVE LITHOTRIPSY (ESWL)      OVER  10 YEARS AGO   • PILONIDAL CYSTECTOMY                         PT Assessment/Plan     Row Name 20 1400          PT Assessment    Assessment Comments  Mr. Villeda reports no change in xymptoms this date. He is ambulating with SPC and boot donned R ankle. Progressed resistance during 4 way ankle and added weight shifting onto R foot without boot donned, onto foam with good tolerance. Approx total standing time 3 -4 minutes, no reports of pain.  Educated pt regarding plan of care as MD said no restrictions and to wean out of boot per pt tolerance. Advised pt to continue with  current HEP, pt receptive.  -RS        PT Plan    PT Plan Comments  Assess tolerance for weight bearing without boot, progress as tolerance allows, consider nirmala nirmala. Add to home as appropriate.   -RS       User Key  (r) = Recorded By, (t) = Taken By, (c) = Cosigned By    Initials Name Provider Type    RS Pam Chavez, PT Physical Therapist            OP Exercises     Row Name 01/23/20 1400 01/23/20 1300          Subjective Comments    Subjective Comments  Pt had good tolerance to initial session, continues to walk with boot and cane, no pain.  -RS  --        Subjective Pain    Able to rate subjective pain?  yes  -RS  --     Pre-Treatment Pain Level  0  -RS  --        Total Minutes    89932 - PT Therapeutic Exercise Minutes  35  -RS  --     94322 - PT Manual Therapy Minutes  --  8  -RS        Exercise 1    Exercise Name 1  Ankle DF/PF  -RS  --     Cueing 1  Verbal;Demo  -RS  --     Reps 1  10  -RS  --        Exercise 2    Exercise Name 2  Ankle circles  -RS  --     Cueing 2  Verbal;Demo  -RS  --     Reps 2  10  -RS  --        Exercise 3    Exercise Name 3  Ankle alphabet  -RS  --     Cueing 3  Verbal;Demo  -RS  --     Reps 3  1  -RS  --        Exercise 4    Exercise Name 4  Ankle theraband ex x 4 ways  -RS  --     Cueing 4  Verbal;Demo  -RS  --     Reps 4  12  -RS  --     Additional Comments  RTB  -RS  --        Exercise 6    Exercise Name 6  seated BAPS  -RS  --     Cueing 6  Verbal;Demo  -RS  --     Reps 6  15ea CW/CCW  -RS  --        Exercise 7    Exercise Name 7  fitter board  -RS  --     Cueing 7  Verbal  -RS  --     Reps 7  15  -RS  --     Additional Comments  PF/DF  -RS  --        Exercise 8    Exercise Name 8  side steps  -RS  --     Cueing 8  Verbal  -RS  --     Reps 8  3 laps  -RS  --        Exercise 9    Exercise Name 9  seated heel raise for improved mobility, no pain  -RS  --     Cueing 9  Verbal  -RS  --     Reps 9  15  -RS  --        Exercise 10    Exercise Name 10  mini squat  -RS  --     Cueing  10  Verbal  -RS  --     Reps 10  10  -RS  --     Additional Comments  cues for hips back  -RS  --        Exercise 11    Exercise Name 11  weight shifts AP/lateral  -RS  --     Cueing 11  Verbal;Demo  -RS  --     Reps 11  15ea  -RS  --     Additional Comments  R foot on foam- no reports of pain  -RS  --       User Key  (r) = Recorded By, (t) = Taken By, (c) = Cosigned By    Initials Name Provider Type    RS Pam Chavez PT Physical Therapist                      Manual Rx (last 36 hours)      Manual Treatments     Row Name 01/23/20 1300             Total Minutes    55987 - PT Manual Therapy Minutes  8  -RS         Manual Rx 1    Manual Rx 1 Location  metatarsal glides AP  -RS      Manual Rx 1 Type  III  -RS         Manual Rx 2    Manual Rx 2 Location  slight distraction into dorsiflexion  -RS      Manual Rx 2 Type  II-III  -RS        User Key  (r) = Recorded By, (t) = Taken By, (c) = Cosigned By    Initials Name Provider Type    RS Pam Chavez PT Physical Therapist                             Time Calculation:   Start Time: 1345  Stop Time: 1430  Time Calculation (min): 45 min  Therapy Charges for Today     Code Description Service Date Service Provider Modifiers Qty    29979560564 HC PT THER PROC EA 15 MIN 1/23/2020 Pam Chavez, PT GP 2    [id number]  PT MANUAL THERAPY EA 15 MIN 1/23/2020 Pam Chavez, PT GP 1                    Pam Chavez PT  1/23/2020

## 2020-01-27 ENCOUNTER — HOSPITAL ENCOUNTER (OUTPATIENT)
Dept: PHYSICAL THERAPY | Facility: HOSPITAL | Age: 80
Setting detail: THERAPIES SERIES
Discharge: HOME | End: 2020-01-27

## 2020-01-27 DIAGNOSIS — Z98.890 STATUS POST ORIF OF FRACTURE OF ANKLE: ICD-10-CM

## 2020-01-27 DIAGNOSIS — Z87.81 STATUS POST ORIF OF FRACTURE OF ANKLE: ICD-10-CM

## 2020-01-27 DIAGNOSIS — Z87.81 HISTORY OF FIBULA FRACTURE: Primary | ICD-10-CM

## 2020-01-27 DIAGNOSIS — M25.571 RIGHT ANKLE PAIN, UNSPECIFIED CHRONICITY: ICD-10-CM

## 2020-01-27 DIAGNOSIS — R26.2 DIFFICULTY WALKING: ICD-10-CM

## 2020-01-27 PROCEDURE — 97110 THERAPEUTIC EXERCISES: CPT

## 2020-01-27 PROCEDURE — 97116 GAIT TRAINING THERAPY: CPT

## 2020-01-27 NOTE — THERAPY TREATMENT NOTE
Outpatient Physical Therapy Ortho Treatment Note  [facility]     Patient Name: Dustin Villeda  : 1940  MRN: [id number]  Today's Date: 2020      Visit Date: 2020    Visit Dx:    ICD-10-CM ICD-9-CM   1. History of fibula fracture Z87.81 V15.51   2. Status post ORIF of fracture of ankle Z98.890 V45.89    Z87.81 V15.51   3. Right ankle pain, unspecified chronicity M25.571 719.47   4. Difficulty walking R26.2 719.7       Patient Active Problem List   Diagnosis   • Closed right ankle fracture        Past Medical History:   Diagnosis Date   • Ankle fracture     RIGHT ANKLE    • Anxiety     ABOUT CURRENT HEALTH SITUATION    • Constipation due to opioid therapy     NO BOWEL MOVEMENT IN 5 DAYS   • Coronary artery disease    • Diabetes mellitus (CMS/HCC)    • Gout     BIG TOES   • Hyperlipidemia    • Hypertension    • Parkinson disease (CMS/HCC)         Past Surgical History:   Procedure Laterality Date   • ANKLE OPEN REDUCTION INTERNAL FIXATION Right 2019    Procedure: OPEN REDUCTION INTERNAL FIXATION RIGHT ANKLE;  Surgeon: Evgeny Keith II, MD;  Location: [facility];  Service: Orthopedics   • CATARACT EXTRACTION EXTRACAPSULAR W/ INTRAOCULAR LENS IMPLANTATION Bilateral    • COLONOSCOPY     • CORONARY ANGIOPLASTY WITH STENT PLACEMENT     • EXTRACORPOREAL SHOCK WAVE LITHOTRIPSY (ESWL)      OVER  10 YEARS AGO   • PILONIDAL CYSTECTOMY                         PT Assessment/Plan     Row Name 20 1500          PT Assessment    Assessment Comments  Mr. Villeda tolerates progression to include gait training activities well without boot donned, performed weight shifts, nirmala nirmala,m and gait training fwd/back without reports of increased pain. Advised pt to slowly wean off boot, no more than 1-1.5 hours then next two days each, will follow up at next appointment to increase time out of boot if tolerance allows. Pt receptive. Advised to continue to use SPC for safety and balance.  Updated HEP.  -RS        PT Plan    PT Plan Comments  Assess tolerance for gait training/ time out of boot, cont to progress. COnsider manual therapy for improved mechanics.  -RS       User Key  (r) = Recorded By, (t) = Taken By, (c) = Cosigned By    Initials Name Provider Type    RS Pam Chavez, PT Physical Therapist            OP Exercises     Row Name 01/27/20 1400             Subjective Comments    Subjective Comments  Pt reports no increase in paina fter previous session WB, he has some stiffness. Been doing HEP.  -RS         Subjective Pain    Able to rate subjective pain?  yes  -RS      Pre-Treatment Pain Level  0  -RS         Total Minutes    41725 - Gait Training Minutes   10  -RS      20672 - PT Therapeutic Exercise Minutes  30  -RS         Exercise 1    Exercise Name 1  Ankle DF/PF  -RS      Cueing 1  Verbal;Demo  -RS      Reps 1  10  -RS         Exercise 2    Exercise Name 2  Ankle circles  -RS      Cueing 2  Verbal;Demo  -RS      Reps 2  10  -RS         Exercise 3    Exercise Name 3  Ankle alphabet  -RS      Cueing 3  Verbal;Demo  -RS      Reps 3  1  -RS         Exercise 4    Exercise Name 4  Ankle theraband ex x 4 ways  -RS      Cueing 4  Verbal;Demo  -RS      Reps 4  15  -RS      Additional Comments  GTB  -RS         Exercise 5    Exercise Name 5  standing gastroc stretch  -RS      Cueing 5  Verbal  -RS      Reps 5  2  -RS      Time 5  30s  -RS         Exercise 6    Exercise Name 6  seated BAPS  -RS      Cueing 6  Verbal;Demo  -RS      Reps 6  15ea CW/CCW  -RS         Exercise 7    Exercise Name 7  fitter board  -RS      Cueing 7  Verbal  -RS      Reps 7  15  -RS      Additional Comments  PF/DF  -RS         Exercise 8    Exercise Name 8  side steps  -RS      Cueing 8  Verbal  -RS      Reps 8  3 laps  -RS         Exercise 9    Exercise Name 9  seated heel raise for improved mobility, no pain  -RS      Cueing 9  Verbal  -RS      Reps 9  15  -RS         Exercise 10    Exercise Name 10  --  -RS       Cueing 10  --  -RS      Reps 10  --  -RS         Exercise 11    Exercise Name 11  weight shifts AP/lateral  -RS      Cueing 11  Verbal;Demo  -RS      Reps 11  15ea  -RS      Additional Comments  shoe donned R foot  -RS         Exercise 12    Exercise Name 12  nirmala nirmala  -RS      Cueing 12  Verbal;Demo  -RS      Reps 12  20  -RS      Additional Comments  for gait training  -RS         Exercise 13    Exercise Name 13  gait train fwd/back  -RS      Cueing 13  Verbal;Demo  -RS      Reps 13  2 long hallway laps  -RS      Additional Comments  shoes donned (no boot), SPC, cues for heel strike, chest up, step length  -RS        User Key  (r) = Recorded By, (t) = Taken By, (c) = Cosigned By    Initials Name Provider Type    RS Pam Chavez, PT Physical Therapist                       PT OP Goals     Row Name 01/27/20 1400          PT Short Term Goals    STG Date to Achieve  01/29/20  -RS     STG 1  Patient will be independent in initial HEP for ROM, strengthening and initial proprioception.  -RS     STG 1 Progress  Met  -RS     STG 2  Patient will be instructed in swelling management, reporting compliance with techniques.   -RS     STG 2 Progress  Ongoing  -RS     STG 3  Patient will demonstrate proper gait pattern with use of boot & cane, demonstrating no LOB with ambulation in clinic.  -RS     STG 3 Progress  Ongoing  -RS        Long Term Goals    LTG Date to Achieve  02/26/20  -RS     LTG 1  Patient will be independent with comprehensive HEP to allow continued exercise independently.  -RS     LTG 1 Progress  Ongoing  -RS     LTG 2  Patient will report improvement in function as indicated by improved LEFS score from 30/80 to 45/80 or better   -RS     LTG 2 Progress  Ongoing  -RS     LTG 3  Patient will report no falls since initiation of physical therapy  -RS     LTG 3 Progress  Ongoing  -RS     LTG 4  Patient will restore R ankle ROM, flexibilty and LE muscle strength to restore gait on even surfaces without boot with  minimal deviations without pain using cane as indicated for safety.  -RS     LTG 4 Progress  Ongoing  -RS     LTG 5  Patient will restore R ankle ROM, flexibilty and LE musculature to allow reciprocal stair-climbing without pain  -RS     LTG 5 Progress  Ongoing  -RS       User Key  (r) = Recorded By, (t) = Taken By, (c) = Cosigned By    Initials Name Provider Type    RS Pam Chavez PT Physical Therapist                         Time Calculation:   Start Time: 1445  Stop Time: 1530  Time Calculation (min): 45 min  Therapy Charges for Today     Code Description Service Date Service Provider Modifiers Qty    88840944750  PT THER PROC EA 15 MIN 1/27/2020 Pam Chavez, PT GP 2    48757077571 HC GAIT TRAINING EA 15 MIN 1/27/2020 Pam Chavez, PT GP 1                    Pam Chavez PT  1/27/2020

## 2020-01-30 ENCOUNTER — HOSPITAL ENCOUNTER (OUTPATIENT)
Dept: PHYSICAL THERAPY | Facility: HOSPITAL | Age: 80
Setting detail: THERAPIES SERIES
Discharge: HOME | End: 2020-01-30

## 2020-01-30 PROCEDURE — 97110 THERAPEUTIC EXERCISES: CPT

## 2020-01-30 PROCEDURE — 97112 NEUROMUSCULAR REEDUCATION: CPT

## 2020-01-30 PROCEDURE — 97140 MANUAL THERAPY 1/> REGIONS: CPT

## 2020-01-30 NOTE — THERAPY TREATMENT NOTE
Outpatient Physical Therapy Ortho Treatment Note  [facility]     Patient Name: Dustin Villeda  : 1940  MRN: [id number]  Today's Date: 2020      Visit Date: 2020    Visit Dx:  No diagnosis found.    Patient Active Problem List   Diagnosis   • Closed right ankle fracture        Past Medical History:   Diagnosis Date   • Ankle fracture     RIGHT ANKLE    • Anxiety     ABOUT CURRENT HEALTH SITUATION    • Constipation due to opioid therapy     NO BOWEL MOVEMENT IN 5 DAYS   • Coronary artery disease    • Diabetes mellitus (CMS/HCC)    • Gout     BIG TOES   • Hyperlipidemia    • Hypertension    • Parkinson disease (CMS/HCC)         Past Surgical History:   Procedure Laterality Date   • ANKLE OPEN REDUCTION INTERNAL FIXATION Right 2019    Procedure: OPEN REDUCTION INTERNAL FIXATION RIGHT ANKLE;  Surgeon: Evgeny Keith II, MD;  Location: [facility];  Service: Orthopedics   • CATARACT EXTRACTION EXTRACAPSULAR W/ INTRAOCULAR LENS IMPLANTATION Bilateral    • COLONOSCOPY     • CORONARY ANGIOPLASTY WITH STENT PLACEMENT     • EXTRACORPOREAL SHOCK WAVE LITHOTRIPSY (ESWL)      OVER  10 YEARS AGO   • PILONIDAL CYSTECTOMY                         PT Assessment/Plan     Row Name 20 1600          PT Assessment    Assessment Comments  Mr. Villeda reports good tolerance when slowly weaning out of boot, wore shoe for multiple hours yesterday (approx 4) without increased pain.  Advised to continue to slowly wean, if has increased pain to go back to boot, also to wear boot for longer walks/uneven terrain, pt receptive. Noted improved segmental mobility of metatarsals post manual therapty. Progressed program to include heel raise standinng, ,taandem balance foam, heel/toe walking, slow marching, all with good tolerance. Cues for upright posture, picking feet up higher, toees forward. Overall, progressing well.  -RS        PT Plan    PT Plan Comments  Assess tolerance for weaning  from boot/ progression of theract. Consider SL balance, tandem walking, step up. Update HEP.  -RS       User Key  (r) = Recorded By, (t) = Taken By, (c) = Cosigned By    Initials Name Provider Type    RS Pam Chavez, PT Physical Therapist            OP Exercises     Row Name 01/30/20 1500             Subjective Comments    Subjective Comments  Been slowly weaning out of boot, no pain. Walking better, feels less off balance with shoes.  -RS         Subjective Pain    Able to rate subjective pain?  yes  -RS      Pre-Treatment Pain Level  0  -RS         Total Minutes    10563 - Gait Training Minutes   5  -RS      54395 - PT Therapeutic Exercise Minutes  20  -RS      11817 -  PT Neuromuscular Reeducation Minutes  10  -RS      46927 - PT Manual Therapy Minutes  6  -RS         Exercise 1    Exercise Name 1  heel raise standing  -RS      Cueing 1  Demo  -RS      Reps 1  15  -RS         Exercise 2    Exercise Name 2  marching slwoly  -RS      Cueing 2  Demo  -RS      Reps 2  2 laps  -RS      Additional Comments  for SL balance  -RS         Exercise 3    Exercise Name 3  Ankle alphabet  -RS      Cueing 3  Verbal;Demo  -RS      Reps 3  1  -RS         Exercise 4    Exercise Name 4  Ankle theraband ex x 4 ways  -RS      Cueing 4  Verbal;Demo  -RS      Reps 4  15 ea  -RS      Additional Comments  GTB  -RS         Exercise 5    Exercise Name 5  standing gastroc stretch  -RS      Cueing 5  Verbal  -RS      Reps 5  2  -RS      Time 5  30s  -RS         Exercise 6    Exercise Name 6  seated BAPS  -RS      Cueing 6  Verbal;Demo  -RS      Reps 6  15ea CW/CCW  -RS         Exercise 7    Exercise Name 7  heel/toe walk  -RS      Cueing 7  Demo  -RS      Reps 7  3 laps  -RS      Additional Comments  PF/DF  -RS         Exercise 8    Exercise Name 8  side steps  -RS      Cueing 8  Verbal  -RS      Reps 8  3 laps  -RS      Additional Comments  RTB  -RS         Exercise 9    Exercise Name 9  seated heel raise for improved mobility, no  pain  -RS      Cueing 9  Verbal  -RS      Reps 9  15  -RS         Exercise 10    Exercise Name 10  mini squat  -RS      Cueing 10  Verbal  -RS      Reps 10  10  -RS      Additional Comments  cues for hips back  -RS         Exercise 11    Exercise Name 11  tandem balance foam  -RS      Cueing 11  Verbal;Demo  -RS      Reps 11  2  -RS      Time 11  20s ea  -RS         Exercise 12    Exercise Name 12  nirmala nirmala  -RS      Cueing 12  Verbal;Demo  -RS      Reps 12  20  -RS      Additional Comments  for gait training  -RS         Exercise 13    Exercise Name 13  gait train fwd/back  -RS      Cueing 13  Verbal;Demo  -RS      Reps 13  1 long hallway lap  -RS      Additional Comments  shoes, no AD, cues for upright posture, toes forward, pick feet up  -RS        User Key  (r) = Recorded By, (t) = Taken By, (c) = Cosigned By    Initials Name Provider Type    RS Pam Chavez, PT Physical Therapist                      Manual Rx (last 36 hours)      Manual Treatments     Row Name 01/30/20 1500             Total Minutes    59105 - PT Manual Therapy Minutes  6  -RS         Manual Rx 1    Manual Rx 1 Location  metatarsal glides AP  -RS      Manual Rx 1 Type  III  -RS         Manual Rx 2    Manual Rx 2 Location  slight distraction into dorsiflexion  -RS      Manual Rx 2 Type  II-III  -RS        User Key  (r) = Recorded By, (t) = Taken By, (c) = Cosigned By    Initials Name Provider Type    RS Pam Chavez, PT Physical Therapist                             Time Calculation:   Start Time: 1530  Stop Time: 1616  Time Calculation (min): 46 min  Therapy Charges for Today     Code Description Service Date Service Provider Modifiers Qty    48345306817 HC PT NEUROMUSC RE EDUCATION EA 15 MIN 1/30/2020 Pam Chavez, PT GP 1    30825495143 HC PT THER PROC EA 15 MIN 1/30/2020 Pam Chavez, PT GP 1    34746011579 HC PT MANUAL THERAPY EA 15 MIN 1/30/2020 Pam Chavez, PT GP 1                    Pam Chavez  PT  1/30/2020

## 2020-02-03 ENCOUNTER — HOSPITAL ENCOUNTER (OUTPATIENT)
Dept: PHYSICAL THERAPY | Facility: HOSPITAL | Age: 80
Setting detail: THERAPIES SERIES
Discharge: HOME | End: 2020-02-03

## 2020-02-03 DIAGNOSIS — M25.571 RIGHT ANKLE PAIN, UNSPECIFIED CHRONICITY: ICD-10-CM

## 2020-02-03 DIAGNOSIS — Z87.81 HISTORY OF FIBULA FRACTURE: Primary | ICD-10-CM

## 2020-02-03 DIAGNOSIS — Z87.81 STATUS POST ORIF OF FRACTURE OF ANKLE: ICD-10-CM

## 2020-02-03 DIAGNOSIS — R26.2 DIFFICULTY WALKING: ICD-10-CM

## 2020-02-03 DIAGNOSIS — Z98.890 STATUS POST ORIF OF FRACTURE OF ANKLE: ICD-10-CM

## 2020-02-03 PROCEDURE — 97140 MANUAL THERAPY 1/> REGIONS: CPT

## 2020-02-03 PROCEDURE — 97112 NEUROMUSCULAR REEDUCATION: CPT

## 2020-02-03 PROCEDURE — 97110 THERAPEUTIC EXERCISES: CPT

## 2020-02-03 NOTE — THERAPY TREATMENT NOTE
Outpatient Physical Therapy Ortho Treatment Note  [facility]     Patient Name: Dustin Villeda  : 1940  MRN: [id number]  Today's Date: 2/3/2020      Visit Date: 2020    Visit Dx:    ICD-10-CM ICD-9-CM   1. History of fibula fracture Z87.81 V15.51   2. Status post ORIF of fracture of ankle Z98.890 V45.89    Z87.81 V15.51   3. Right ankle pain, unspecified chronicity M25.571 719.47   4. Difficulty walking R26.2 719.7       Patient Active Problem List   Diagnosis   • Closed right ankle fracture        Past Medical History:   Diagnosis Date   • Ankle fracture     RIGHT ANKLE    • Anxiety     ABOUT CURRENT HEALTH SITUATION    • Constipation due to opioid therapy     NO BOWEL MOVEMENT IN 5 DAYS   • Coronary artery disease    • Diabetes mellitus (CMS/HCC)    • Gout     BIG TOES   • Hyperlipidemia    • Hypertension    • Parkinson disease (CMS/HCC)         Past Surgical History:   Procedure Laterality Date   • ANKLE OPEN REDUCTION INTERNAL FIXATION Right 2019    Procedure: OPEN REDUCTION INTERNAL FIXATION RIGHT ANKLE;  Surgeon: Evgeny Keith II, MD;  Location: [facility];  Service: Orthopedics   • CATARACT EXTRACTION EXTRACAPSULAR W/ INTRAOCULAR LENS IMPLANTATION Bilateral    • COLONOSCOPY     • CORONARY ANGIOPLASTY WITH STENT PLACEMENT     • EXTRACORPOREAL SHOCK WAVE LITHOTRIPSY (ESWL)      OVER  10 YEARS AGO   • PILONIDAL CYSTECTOMY  1964                       PT Assessment/Plan     Row Name 20 1200          PT Assessment    Assessment Comments  Mr. Villeda is tolerating weainging out of boot well without reports of increased pain. He requires frequent cues for upright posture and demonstrates mild difficulty with balance reactions shifting front/ back, specifically recovery when weight is shifted posteriorly.  Added weight shifts foam, rows on foam, tandem walk with good tolerance. Overall, progressing toward goals, continues to be appropriate for skilled PT to focus on  "improed strength, baalnce, ROM. Updated HEP.  -RS        PT Plan    PT Plan Comments  Assess tolerance for therex progression, consider Sl balance training.  -RS       User Key  (r) = Recorded By, (t) = Taken By, (c) = Cosigned By    Initials Name Provider Type    RS Pam Chavez, PT Physical Therapist            OP Exercises     Row Name 02/03/20 1200 02/03/20 1100          Subjective Comments    Subjective Comments  Been wearing shoes not boot most of time, no pain, has some stiffness \"where foot meets leg\" on the outside of the leg,   -RS  --        Subjective Pain    Able to rate subjective pain?  yes  -RS  --     Pre-Treatment Pain Level  0  -RS  --     Subjective Pain Comment  no pain, just stiffness.  -RS  --        Total Minutes    53341 - Gait Training Minutes   5  -RS  --     71082 - PT Therapeutic Exercise Minutes  15  -RS  --     20958 -  PT Neuromuscular Reeducation Minutes  15  -RS  --     93793 - PT Manual Therapy Minutes  --  7  -RS        Exercise 1    Exercise Name 1  heel raise standing  -RS  --     Cueing 1  Demo  -RS  --     Reps 1  15  -RS  --        Exercise 2    Exercise Name 2  marching slwoly  -RS  --     Cueing 2  Demo  -RS  --     Reps 2  2 laps  -RS  --     Additional Comments  for SL balance  -RS  --        Exercise 3    Exercise Name 3  Ankle alphabet  -RS  --     Cueing 3  Verbal;Demo  -RS  --     Reps 3  1  -RS  --        Exercise 4    Exercise Name 4  Ankle theraband ex x 4 ways  -RS  --     Cueing 4  Verbal;Demo  -RS  --     Reps 4  15 ea  -RS  --     Additional Comments  BTB  -RS  --        Exercise 5    Exercise Name 5  standing gastroc stretch  -RS  --     Cueing 5  Verbal  -RS  --     Reps 5  2  -RS  --     Time 5  30s  -RS  --        Exercise 6    Exercise Name 6  seated BAPS  -RS  --     Cueing 6  Verbal;Demo  -RS  --     Reps 6  15ea CW/CCW  -RS  --        Exercise 7    Exercise Name 7  heel/toe walk  -RS  --     Cueing 7  Demo  -RS  --     Reps 7  3 laps  -RS  --     " Additional Comments  PF/DF  -RS  --        Exercise 8    Exercise Name 8  --  -RS  --     Cueing 8  --  -RS  --     Reps 8  --  -RS  --     Additional Comments  RTB  -RS  --        Exercise 9    Exercise Name 9  seated heel raise for improved mobility, no pain  -RS  --     Cueing 9  Verbal  -RS  --     Reps 9  15  -RS  --        Exercise 10    Exercise Name 10  --  -RS  --     Cueing 10  --  -RS  --     Reps 10  --  -RS  --        Exercise 11    Exercise Name 11  tandem balance foam  -RS  --     Cueing 11  Verbal;Demo  -RS  --     Reps 11  2  -RS  --     Time 11  20s ea  -RS  --        Exercise 12    Exercise Name 12  weight shifts foam  -RS  --     Cueing 12  Verbal;Demo  -RS  --     Reps 12  --  -RS  --     Time 12  2 min  -RS  --     Additional Comments  front and back, cues to correct balance, upright posture  -RS  --        Exercise 13    Exercise Name 13  gait train fwd/back/tandem  -RS  --     Cueing 13  Verbal;Demo  -RS  --     Reps 13  3 laps each  -RS  --     Additional Comments  shoes, no AD, cues for upright posture, toes forward, pick feet up  -RS  --        Exercise 14    Exercise Name 14  step down front/ back up  -RS  --     Cueing 14  Verbal;Demo  -RS  --     Reps 14  12  -RS  --     Additional Comments  no pain, 4 inch  -RS  --        Exercise 15    Exercise Name 15  rows foam  -RS  --     Cueing 15  Verbal  -RS  --     Reps 15  10  -RS  --     Additional Comments  RTB, cues for upright posture  -RS  --       User Key  (r) = Recorded By, (t) = Taken By, (c) = Cosigned By    Initials Name Provider Type    RS Pam Chavez, PT Physical Therapist                      Manual Rx (last 36 hours)      Manual Treatments     Row Name 02/03/20 1100             Total Minutes    64580 - PT Manual Therapy Minutes  7  -RS         Manual Rx 1    Manual Rx 1 Location  metatarsal glides AP  -RS      Manual Rx 1 Type  III  -RS         Manual Rx 2    Manual Rx 2 Location  slight distraction into dorsiflexion  -RS       Manual Rx 2 Type  II-III  -RS        User Key  (r) = Recorded By, (t) = Taken By, (c) = Cosigned By    Initials Name Provider Type    RS Pam Chavez PT Physical Therapist          PT OP Goals     Row Name 02/03/20 1200          PT Short Term Goals    STG Date to Achieve  01/29/20  -RS     STG 1  Patient will be independent in initial HEP for ROM, strengthening and initial proprioception.  -RS     STG 1 Progress  Met  -RS     STG 2  Patient will be instructed in swelling management, reporting compliance with techniques.   -RS     STG 2 Progress  Partially Met  -RS     STG 3  Patient will demonstrate proper gait pattern with use of boot & cane, demonstrating no LOB with ambulation in clinic.  -RS     STG 3 Progress  Partially Met  -RS     STG 3 Progress Comments  shoes donned B feet, cues for upright posture  -RS        Long Term Goals    LTG Date to Achieve  02/26/20  -RS     LTG 1  Patient will be independent with comprehensive HEP to allow continued exercise independently.  -RS     LTG 1 Progress  Ongoing  -RS     LTG 2  Patient will report improvement in function as indicated by improved LEFS score from 30/80 to 45/80 or better   -RS     LTG 2 Progress  Ongoing  -RS     LTG 3  Patient will report no falls since initiation of physical therapy  -RS     LTG 3 Progress  Ongoing  -RS     LTG 4  Patient will restore R ankle ROM, flexibilty and LE muscle strength to restore gait on even surfaces without boot with minimal deviations without pain using cane as indicated for safety.  -RS     LTG 4 Progress  Ongoing  -RS     LTG 5  Patient will restore R ankle ROM, flexibilty and LE musculature to allow reciprocal stair-climbing without pain  -RS     LTG 5 Progress  Ongoing  -RS       User Key  (r) = Recorded By, (t) = Taken By, (c) = Cosigned By    Initials Name Provider Type    RS Pam Chavez PT Physical Therapist                         Time Calculation:   Start Time: 1215  Stop Time: 1300  Time  Calculation (min): 45 min  Therapy Charges for Today     Code Description Service Date Service Provider Modifiers Qty    07448162603 HC PT NEUROMUSC RE EDUCATION EA 15 MIN 2/3/2020 Pam Chavez, PT GP 1    77227335714 HC PT THER PROC EA 15 MIN 2/3/2020 Pam Chavez, PT GP 1    43631351524 HC PT MANUAL THERAPY EA 15 MIN 2/3/2020 Pam Chavez, PT GP 1                    Pam Chavez, PT  2/3/2020

## 2020-02-06 ENCOUNTER — HOSPITAL ENCOUNTER (OUTPATIENT)
Dept: PHYSICAL THERAPY | Facility: HOSPITAL | Age: 80
Setting detail: THERAPIES SERIES
Discharge: HOME | End: 2020-02-06

## 2020-02-06 DIAGNOSIS — R26.2 DIFFICULTY WALKING: ICD-10-CM

## 2020-02-06 DIAGNOSIS — Z98.890 STATUS POST ORIF OF FRACTURE OF ANKLE: ICD-10-CM

## 2020-02-06 DIAGNOSIS — Z87.81 STATUS POST ORIF OF FRACTURE OF ANKLE: ICD-10-CM

## 2020-02-06 DIAGNOSIS — M25.571 RIGHT ANKLE PAIN, UNSPECIFIED CHRONICITY: ICD-10-CM

## 2020-02-06 DIAGNOSIS — Z87.81 HISTORY OF FIBULA FRACTURE: Primary | ICD-10-CM

## 2020-02-06 PROCEDURE — 97110 THERAPEUTIC EXERCISES: CPT

## 2020-02-06 PROCEDURE — 97140 MANUAL THERAPY 1/> REGIONS: CPT

## 2020-02-06 PROCEDURE — 97112 NEUROMUSCULAR REEDUCATION: CPT

## 2020-02-06 NOTE — THERAPY TREATMENT NOTE
Outpatient Physical Therapy Ortho Treatment Note  [facility]     Patient Name: Dustin Villeda  : 1940  MRN: [id number]  Today's Date: 2020      Visit Date: 2020    Visit Dx:    ICD-10-CM ICD-9-CM   1. History of fibula fracture Z87.81 V15.51   2. Status post ORIF of fracture of ankle Z98.890 V45.89    Z87.81 V15.51   3. Right ankle pain, unspecified chronicity M25.571 719.47   4. Difficulty walking R26.2 719.7       Patient Active Problem List   Diagnosis   • Closed right ankle fracture        Past Medical History:   Diagnosis Date   • Ankle fracture     RIGHT ANKLE    • Anxiety     ABOUT CURRENT HEALTH SITUATION    • Constipation due to opioid therapy     NO BOWEL MOVEMENT IN 5 DAYS   • Coronary artery disease    • Diabetes mellitus (CMS/HCC)    • Gout     BIG TOES   • Hyperlipidemia    • Hypertension    • Parkinson disease (CMS/HCC)         Past Surgical History:   Procedure Laterality Date   • ANKLE OPEN REDUCTION INTERNAL FIXATION Right 2019    Procedure: OPEN REDUCTION INTERNAL FIXATION RIGHT ANKLE;  Surgeon: Evgeny Keith II, MD;  Location: [facility];  Service: Orthopedics   • CATARACT EXTRACTION EXTRACAPSULAR W/ INTRAOCULAR LENS IMPLANTATION Bilateral    • COLONOSCOPY     • CORONARY ANGIOPLASTY WITH STENT PLACEMENT     • EXTRACORPOREAL SHOCK WAVE LITHOTRIPSY (ESWL)      OVER  10 YEARS AGO   • PILONIDAL CYSTECTOMY                         PT Assessment/Plan     Row Name 20 1400          PT Assessment    Assessment Comments  Progressed program to include SL balance on foam, pt requires UE assist, advised to perform at home at counter for 30 sec at a time. Also emphasized importance of upright posture during gait, able to maintain short periods. Overall, noting improved DF ROM, continues to be limited in midfoot segmental mobility.   -RS        PT Plan    PT Plan Comments  Update HEP, cont standing activities.  -RS       User Key  (r) = Recorded By,  (t) = Taken By, (c) = Cosigned By    Initials Name Provider Type    RS Pam Chavez, PT Physical Therapist            OP Exercises     Row Name 02/06/20 1300             Subjective Comments    Subjective Comments  been doing better, still have some occasional bouts of pain but they are 4/10 and go away if i rest for a short period.  -RS         Subjective Pain    Able to rate subjective pain?  yes  -RS      Pre-Treatment Pain Level  0  -RS      Subjective Pain Comment  4/10 at higest  -RS         Total Minutes    44178 - PT Therapeutic Exercise Minutes  15  -RS      32890 -  PT Neuromuscular Reeducation Minutes  20  -RS      22164 - PT Manual Therapy Minutes  9  -RS         Exercise 1    Exercise Name 1  heel raise standing  -RS      Cueing 1  Demo  -RS      Reps 1  15  -RS         Exercise 2    Exercise Name 2  marching slwoly  -RS      Cueing 2  Demo  -RS      Reps 2  2 laps  -RS      Additional Comments  for SL balance  -RS         Exercise 3    Exercise Name 3  Ankle alphabet  -RS      Cueing 3  Verbal;Demo  -RS      Reps 3  1  -RS         Exercise 4    Exercise Name 4  Ankle theraband ex x 4 ways  -RS      Cueing 4  --  -RS      Reps 4  --  -RS      Additional Comments  home  -RS         Exercise 5    Exercise Name 5  standing gastroc stretch  -RS      Cueing 5  Verbal  -RS      Reps 5  2  -RS      Time 5  30s  -RS         Exercise 6    Exercise Name 6  seated BAPS  -RS      Cueing 6  Verbal;Demo  -RS      Reps 6  15ea CW/CCW  -RS         Exercise 7    Exercise Name 7  heel/toe walk  -RS      Cueing 7  Demo  -RS      Reps 7  3 laps  -RS      Additional Comments  PF/DF  -RS         Exercise 8    Exercise Name 8  seated toe extension  -RS      Cueing 8  Verbal  -RS      Reps 8  20  -RS         Exercise 9    Exercise Name 9  seated heel raise for improved mobility, no pain  -RS      Cueing 9  Verbal  -RS      Reps 9  15  -RS         Exercise 10    Exercise Name 10  SL balance  -RS      Cueing 10  Verbal  -RS       Reps 10  2  -RS      Time 10  30s  -RS      Additional Comments  foam  -RS         Exercise 11    Exercise Name 11  tandem walk  -RS      Cueing 11  Verbal;Demo  -RS      Reps 11  2 laps  -RS      Time 11  20s ea  -RS         Exercise 12    Exercise Name 12  weight shifts foam  -RS      Cueing 12  Verbal;Demo  -RS      Time 12  2 min  -RS         Exercise 13    Exercise Name 13  --  -RS      Cueing 13  --  -RS      Reps 13  --  -RS      Additional Comments  --  -RS         Exercise 14    Exercise Name 14  step down front/ back up  -RS      Cueing 14  Verbal;Demo  -RS      Reps 14  12  -RS      Additional Comments  no pain, 4 inch  -RS         Exercise 15    Exercise Name 15  rows foam  -RS      Cueing 15  Verbal  -RS      Reps 15  10  -RS      Additional Comments  GTB- cues for upright posture  -RS        User Key  (r) = Recorded By, (t) = Taken By, (c) = Cosigned By    Initials Name Provider Type    RS Pam Chavez PT Physical Therapist                      Manual Rx (last 36 hours)      Manual Treatments     Row Name 02/06/20 1300             Total Minutes    98373 - PT Manual Therapy Minutes  9  -RS         Manual Rx 1    Manual Rx 1 Location  metatarsal glides AP  -RS      Manual Rx 1 Type  III  -RS         Manual Rx 2    Manual Rx 2 Location  slight distraction into dorsiflexion  -RS      Manual Rx 2 Type  II-III  -RS      Manual Rx 2 Duration  noted improved PROM DF  -RS        User Key  (r) = Recorded By, (t) = Taken By, (c) = Cosigned By    Initials Name Provider Type    RS Pam Chavez PT Physical Therapist                             Time Calculation:   Start Time: 1330  Stop Time: 1415  Time Calculation (min): 45 min  Therapy Charges for Today     Code Description Service Date Service Provider Modifiers Qty    17574526169  PT NEUROMUSC RE EDUCATION EA 15 MIN 2/6/2020 Pam Chavez, PT GP 1    56601396068 HC PT THER PROC EA 15 MIN 2/6/2020 Pam Chavez, PT GP 1    [id number]   PT MANUAL THERAPY EA 15 MIN 2/6/2020 Pam Chavez, PT GP 1                    Pam Chavez, PT  2/6/2020

## 2020-02-10 ENCOUNTER — HOSPITAL ENCOUNTER (OUTPATIENT)
Dept: PHYSICAL THERAPY | Facility: HOSPITAL | Age: 80
Setting detail: THERAPIES SERIES
Discharge: HOME | End: 2020-02-10

## 2020-02-10 DIAGNOSIS — Z87.81 STATUS POST ORIF OF FRACTURE OF ANKLE: ICD-10-CM

## 2020-02-10 DIAGNOSIS — Z98.890 STATUS POST ORIF OF FRACTURE OF ANKLE: ICD-10-CM

## 2020-02-10 DIAGNOSIS — Z87.81 HISTORY OF FIBULA FRACTURE: Primary | ICD-10-CM

## 2020-02-10 DIAGNOSIS — M25.571 RIGHT ANKLE PAIN, UNSPECIFIED CHRONICITY: ICD-10-CM

## 2020-02-10 DIAGNOSIS — R26.2 DIFFICULTY WALKING: ICD-10-CM

## 2020-02-10 PROCEDURE — 97110 THERAPEUTIC EXERCISES: CPT

## 2020-02-10 PROCEDURE — 97112 NEUROMUSCULAR REEDUCATION: CPT

## 2020-02-10 PROCEDURE — 97140 MANUAL THERAPY 1/> REGIONS: CPT

## 2020-02-10 NOTE — THERAPY TREATMENT NOTE
Outpatient Physical Therapy Ortho Treatment Note  [facility]     Patient Name: Dustin Villeda  : 1940  MRN: [id number]  Today's Date: 2/10/2020      Visit Date: 02/10/2020    Visit Dx:    ICD-10-CM ICD-9-CM   1. History of fibula fracture Z87.81 V15.51   2. Status post ORIF of fracture of ankle Z98.890 V45.89    Z87.81 V15.51   3. Right ankle pain, unspecified chronicity M25.571 719.47   4. Difficulty walking R26.2 719.7       Patient Active Problem List   Diagnosis   • Closed right ankle fracture        Past Medical History:   Diagnosis Date   • Ankle fracture     RIGHT ANKLE    • Anxiety     ABOUT CURRENT HEALTH SITUATION    • Constipation due to opioid therapy     NO BOWEL MOVEMENT IN 5 DAYS   • Coronary artery disease    • Diabetes mellitus (CMS/HCC)    • Gout     BIG TOES   • Hyperlipidemia    • Hypertension    • Parkinson disease (CMS/HCC)         Past Surgical History:   Procedure Laterality Date   • ANKLE OPEN REDUCTION INTERNAL FIXATION Right 2019    Procedure: OPEN REDUCTION INTERNAL FIXATION RIGHT ANKLE;  Surgeon: Evgeny Keith II, MD;  Location: [facility];  Service: Orthopedics   • CATARACT EXTRACTION EXTRACAPSULAR W/ INTRAOCULAR LENS IMPLANTATION Bilateral    • COLONOSCOPY     • CORONARY ANGIOPLASTY WITH STENT PLACEMENT     • EXTRACORPOREAL SHOCK WAVE LITHOTRIPSY (ESWL)      OVER  10 YEARS AGO   • PILONIDAL CYSTECTOMY                         PT Assessment/Plan     Row Name 02/10/20 1200          PT Assessment    Assessment Comments  The pt reports having increased pain for 1 day last week, unable to recall an even that increased the pain, lasted less than 24 hours. He has been wearing his shoes all the time, no boot and also not using a cane. Held step downs on SL this date. Updated HEP to include tandem walking, SL balance, heel raise, calf stretch, pt receptive.  -RS        PT Plan    PT Plan Comments  Cont skilled PT decrease frequency the following  "week, consider 1x a week. Prog note next week.  -RS       User Key  (r) = Recorded By, (t) = Taken By, (c) = Cosigned By    Initials Name Provider Type    RS Pam Chavez, PT Physical Therapist            OP Exercises     Row Name 02/10/20 1200 02/10/20 1100          Subjective Comments    Subjective Comments  Friday the ankle hurt all day, felt more sore than it had been for over a week. The rest of the days were OK, just low lvel soreess.  -RS  --        Subjective Pain    Able to rate subjective pain?  yes  -RS  --     Pre-Treatment Pain Level  0  -RS  --     Subjective Pain Comment  4-5/10 Friday  -RS  --        Total Minutes    61209 - PT Therapeutic Exercise Minutes  15  -RS  --     19135 -  PT Neuromuscular Reeducation Minutes  18  -RS  --     78683 - PT Manual Therapy Minutes  --  9  -RS        Exercise 1    Exercise Name 1  heel raise standing  -RS  --     Cueing 1  Demo  -RS  --     Reps 1  15  -RS  --     Additional Comments  small ROM  -RS  --        Exercise 2    Exercise Name 2  marching slwoly  -RS  --     Cueing 2  Demo  -RS  --     Reps 2  2 laps  -RS  --     Additional Comments  for SL balance  -RS  --        Exercise 3    Exercise Name 3  Ankle alphabet  -RS  --     Cueing 3  Verbal;Demo  -RS  --     Reps 3  1  -RS  --        Exercise 4    Exercise Name 4  Ankle theraband ex x 4 ways  -RS  --        Exercise 5    Exercise Name 5  standing gastroc stretch  -RS  --     Cueing 5  Verbal  -RS  --     Reps 5  2  -RS  --     Time 5  30s  -RS  --        Exercise 6    Exercise Name 6  seated BAPS  -RS  --     Cueing 6  Verbal;Demo  -RS  --     Reps 6  15ea CW/CCW  -RS  --        Exercise 7    Exercise Name 7  heel/toe walk  -RS  --     Cueing 7  Demo  -RS  --     Reps 7  3 laps  -RS  --        Exercise 8    Exercise Name 8  seated toe extension  -RS  --     Cueing 8  Verbal  -RS  --     Reps 8  20  -RS  --     Additional Comments  plus \"splaying\"  -RS  --        Exercise 9    Exercise Name 9  seated " heel raise for improved mobility, no pain  -RS  --     Cueing 9  Verbal  -RS  --     Reps 9  15  -RS  --        Exercise 10    Exercise Name 10  SL balance  -RS  --     Cueing 10  Verbal  -RS  --     Reps 10  2  -RS  --     Time 10  30s  -RS  --     Additional Comments  foam  -RS  --        Exercise 11    Exercise Name 11  tandem walk  -RS  --     Cueing 11  Verbal;Demo  -RS  --     Reps 11  2 laps  -RS  --     Time 11  20s ea  -RS  --        Exercise 12    Exercise Name 12  weight shifts foam  -RS  --     Cueing 12  Verbal;Demo  -RS  --     Time 12  2 min  -RS  --        Exercise 14    Exercise Name 14  --  -RS  --     Cueing 14  --  -RS  --     Reps 14  --  -RS  --        Exercise 15    Exercise Name 15  rows foam  -RS  --     Cueing 15  Verbal  -RS  --     Reps 15  10  -RS  --     Additional Comments  GTB- cues for upright posture  -RS  --       User Key  (r) = Recorded By, (t) = Taken By, (c) = Cosigned By    Initials Name Provider Type    RS Pam Chavez PT Physical Therapist                      Manual Rx (last 36 hours)      Manual Treatments     Row Name 02/10/20 1100             Total Minutes    09694 - PT Manual Therapy Minutes  9  -RS         Manual Rx 1    Manual Rx 1 Location  metatarsal glides AP  -RS      Manual Rx 1 Type  III  -RS         Manual Rx 2    Manual Rx 2 Location  slight distraction into dorsiflexion  -RS      Manual Rx 2 Type  II-III  -RS      Manual Rx 2 Duration  noted improved PROM DF  -RS        User Key  (r) = Recorded By, (t) = Taken By, (c) = Cosigned By    Initials Name Provider Type    RS Pam Chavez PT Physical Therapist                             Time Calculation:   Start Time: 1215  Stop Time: 1300  Time Calculation (min): 45 min  Therapy Charges for Today     Code Description Service Date Service Provider Modifiers Qty    65055808191 HC PT NEUROMUSC RE EDUCATION EA 15 MIN 2/10/2020 Pam Chavez PT GP 1    07726501156 HC PT THER PROC EA 15 MIN 2/10/2020  Pam Chavez, PT GP 1    62266119465 HC PT MANUAL THERAPY EA 15 MIN 2/10/2020 Pam Chavez, PT GP 1                    Pam Chavez, PT  2/10/2020

## 2020-02-13 ENCOUNTER — HOSPITAL ENCOUNTER (OUTPATIENT)
Dept: PHYSICAL THERAPY | Facility: HOSPITAL | Age: 80
Setting detail: THERAPIES SERIES
Discharge: HOME | End: 2020-02-13

## 2020-02-13 DIAGNOSIS — R26.2 DIFFICULTY WALKING: ICD-10-CM

## 2020-02-13 DIAGNOSIS — Z98.890 STATUS POST ORIF OF FRACTURE OF ANKLE: ICD-10-CM

## 2020-02-13 DIAGNOSIS — M25.571 RIGHT ANKLE PAIN, UNSPECIFIED CHRONICITY: ICD-10-CM

## 2020-02-13 DIAGNOSIS — Z87.81 STATUS POST ORIF OF FRACTURE OF ANKLE: ICD-10-CM

## 2020-02-13 DIAGNOSIS — Z87.81 HISTORY OF FIBULA FRACTURE: Primary | ICD-10-CM

## 2020-02-13 PROCEDURE — 97110 THERAPEUTIC EXERCISES: CPT

## 2020-02-13 PROCEDURE — 97112 NEUROMUSCULAR REEDUCATION: CPT

## 2020-02-13 NOTE — THERAPY PROGRESS REPORT/RE-CERT
Outpatient Physical Therapy Ortho Progress Note  [facility]     Patient Name: Dustin Villeda  : 1940  MRN: [id number]  Today's Date: 2020      Visit Date: 2020    Visit Dx:    ICD-10-CM ICD-9-CM   1. History of fibula fracture Z87.81 V15.51   2. Status post ORIF of fracture of ankle Z98.890 V45.89    Z87.81 V15.51   3. Right ankle pain, unspecified chronicity M25.571 719.47   4. Difficulty walking R26.2 719.7       Patient Active Problem List   Diagnosis   • Closed right ankle fracture        Past Medical History:   Diagnosis Date   • Ankle fracture     RIGHT ANKLE    • Anxiety     ABOUT CURRENT HEALTH SITUATION    • Constipation due to opioid therapy     NO BOWEL MOVEMENT IN 5 DAYS   • Coronary artery disease    • Diabetes mellitus (CMS/HCC)    • Gout     BIG TOES   • Hyperlipidemia    • Hypertension    • Parkinson disease (CMS/HCC)         Past Surgical History:   Procedure Laterality Date   • ANKLE OPEN REDUCTION INTERNAL FIXATION Right 2019    Procedure: OPEN REDUCTION INTERNAL FIXATION RIGHT ANKLE;  Surgeon: Evgeny Keith II, MD;  Location: [facility];  Service: Orthopedics   • CATARACT EXTRACTION EXTRACAPSULAR W/ INTRAOCULAR LENS IMPLANTATION Bilateral    • COLONOSCOPY     • CORONARY ANGIOPLASTY WITH STENT PLACEMENT     • EXTRACORPOREAL SHOCK WAVE LITHOTRIPSY (ESWL)      OVER  10 YEARS AGO   • PILONIDAL CYSTECTOMY  1964       PT Ortho     Row Name 20 1400       Right Lower Ext    Rt Ankle Dorsiflexion AROM  5 deg  -RS    Rt Ankle Plantarflexion AROM  30  -RS    Rt Ankle Inversion AROM  18  -RS    Rt Ankle Eversion AROM  15  -RS       MMT Right Lower Ext    Rt Ankle Plantarflexion MMT, Gross Movement  (4/5) good  -RS    Rt Ankle Dorsiflexion MMT, Gross Movement  (4+/5) good plus  -RS    Rt Ankle Subtalar Inversion MT, Gross Movement  (4/5) good  -RS    Rt Ankle Subtalar Eversion MMT, Gross Movement  (4-/5) good minus  -RS      User Key  (r) = Recorded  By, (t) = Taken By, (c) = Cosigned By    Initials Name Provider Type    RS Pam Chavez PT Physical Therapist                      PT Assessment/Plan     Row Name 02/13/20 1400          PT Assessment    Functional Limitations  Decreased safety during functional activities;Impaired gait;Limitations in functional capacity and performance  -RS     Impairments  Balance;Gait;Impaired flexibility;Muscle strength;Pain;Posture;Range of motion  -RS     Assessment Comments  Ms. Villeda has met all short term goals regarding gait, HEP, and symptom management. He demonstrates improvements in R ankle AROM and strength, however continues to be limited in R ankle strength (PF) and requires frequent cues for upright posture during gait. Trialed short time on treadmill as pt reports walking 20 min prior to fall/ORIF, good tolerance for 1.5 mph 5 min. Overall, progressing well toward goals.  -RS     Please refer to paper survey for additional self-reported information  Yes  -RS     Rehab Potential  Good  -RS     Patient/caregiver participated in establishment of treatment plan and goals  Yes  -RS     Patient would benefit from skilled therapy intervention  Yes  -RS        PT Plan    Predicted Duration of Therapy Intervention (Therapy Eval)  2-4 more sessions over 4-6 weeks  -RS     PT Plan Comments  Decrease frequency of skilled PT, assess tolerance for treadmill at home (instructed to start with 5-7 min and slowly increase by 10% a week).  -RS       User Key  (r) = Recorded By, (t) = Taken By, (c) = Cosigned By    Initials Name Provider Type    RS Pam Chavez, PT Physical Therapist            OP Exercises     Row Name 02/13/20 1400             Subjective Comments    Subjective Comments  Haven't had any pain since last Friday, some soreness like being aware of ankle but not pain. Hasn't tried treadmillll walking but feels back to most things.  -RS         Subjective Pain    Able to rate subjective pain?  yes  -RS       "Pre-Treatment Pain Level  0  -RS         Total Minutes    75213 - PT Therapeutic Exercise Minutes  20  -RS      42204 -  PT Neuromuscular Reeducation Minutes  20  -RS         Exercise 1    Exercise Name 1  heel raise standing  -RS      Cueing 1  Demo  -RS      Sets 1  2  -RS      Reps 1  15  -RS      Additional Comments  first set up on B, down R, second set on B  -RS         Exercise 2    Exercise Name 2  marching slwoly  -RS      Cueing 2  Demo  -RS      Reps 2  2 laps  -RS      Additional Comments  for SL balance- cues for upright posture  -RS         Exercise 3    Exercise Name 3  treadmill  -RS      Cueing 3  Verbal  -RS      Reps 3  1.5  -RS      Time 3  5s  -RS      Additional Comments  BUE support and PT supervision  -RS         Exercise 4    Exercise Name 4  seated EVR with TB  -RS      Cueing 4  Verbal  -RS      Reps 4  15  -RS      Additional Comments  GTB  -RS         Exercise 5    Exercise Name 5  standing gastroc stretch  -RS      Cueing 5  Verbal  -RS      Reps 5  2  -RS      Time 5  30s  -RS         Exercise 6    Exercise Name 6  seated BAPS  -RS      Cueing 6  Verbal;Demo  -RS      Reps 6  15ea CW/CCW  -RS         Exercise 7    Exercise Name 7  heel/toe walk  -RS      Cueing 7  Demo  -RS      Reps 7  3 laps  -RS         Exercise 8    Exercise Name 8  seated toe extension  -RS      Cueing 8  Verbal  -RS      Reps 8  20  -RS      Time 8  plus \"splaying\"  -RS         Exercise 9    Exercise Name 9  seated heel raise for improved mobility, no pain  -RS      Cueing 9  Verbal  -RS      Reps 9  15  -RS         Exercise 10    Exercise Name 10  SL balance  -RS      Cueing 10  Verbal  -RS      Reps 10  2  -RS      Time 10  30s  -RS      Additional Comments  foam  -RS         Exercise 11    Exercise Name 11  tandem walk  -RS      Cueing 11  Verbal;Demo  -RS      Reps 11  2 laps  -RS      Time 11  20s ea  -RS         Exercise 12    Exercise Name 12  weight shifts foam  -RS      Cueing 12  Verbal;Demo  -RS      " Time 12  2 min  -RS         Exercise 14    Exercise Name 14  step up and over  -RS      Cueing 14  Verbal;Demo  -RS      Reps 14  12  -RS      Additional Comments  4 inch  -RS         Exercise 15    Exercise Name 15  --  -RS      Cueing 15  --  -RS      Reps 15  --  -RS        User Key  (r) = Recorded By, (t) = Taken By, (c) = Cosigned By    Initials Name Provider Type    RS Pam Chavez, PT Physical Therapist                       PT OP Goals     Row Name 02/13/20 1400          PT Short Term Goals    STG Date to Achieve  01/29/20  -RS     STG 1  Patient will be independent in initial HEP for ROM, strengthening and initial proprioception.  -RS     STG 1 Progress  Met  -RS     STG 2  Patient will be instructed in swelling management, reporting compliance with techniques.   -RS     STG 2 Progress  Met  -RS     STG 2 Progress Comments  no swelling noted  -RS     STG 3  Patient will demonstrate proper gait pattern with use of boot & cane, demonstrating no LOB with ambulation in clinic.  -RS     STG 3 Progress  Met  -RS     STG 3 Progress Comments  ambulates without boot or AD safely, forward flexed posture  -RS        Long Term Goals    LTG Date to Achieve  02/26/20  -RS     LTG 1  Patient will be independent with comprehensive HEP to allow continued exercise independently.  -RS     LTG 1 Progress  Progressing  -RS     LTG 2  Patient will report improvement in function as indicated by improved LEFS score from 30/80 to 45/80 or better   -RS     LTG 2 Progress  Met  -RS     LTG 2 Progress Comments  61/80  -RS     LTG 3  Patient will report no falls since initiation of physical therapy  -RS     LTG 3 Progress  Partially Met  -RS     LTG 3 Progress Comments  no falls to date  -RS     LTG 4  Patient will restore R ankle ROM, flexibilty and LE muscle strength to restore gait on even surfaces without boot with minimal deviations without pain using cane as indicated for safety.  -RS     LTG 4 Progress  Ongoing  -RS     LTG  4 Progress Comments  symmetrical step length, continues to scuff feet slightly and demonstrate forward flexed posture  -RS     LTG 5  Patient will restore R ankle ROM, flexibilty and LE musculature to allow reciprocal stair-climbing without pain  -RS     LTG 5 Progress  Ongoing  -RS       User Key  (r) = Recorded By, (t) = Taken By, (c) = Cosigned By    Initials Name Provider Type    RS Pam Chavez PT Physical Therapist               Outcome Measure Options: Lower Extremity Functional Scale (LEFS)  Lower Extremity Functional Index  Any of your usual work, housework or school activities: No difficulty  Your usual hobbies, recreational or sporting activities: A little bit of difficulty  Getting into or out of the bath: No difficulty  Walking between rooms: No difficulty  Putting on your shoes or socks: A little bit of difficulty  Squatting: A little bit of difficulty  Lifting an object, like a bag of groceries from the floor: A little bit of difficulty  Performing light activities around your home: No difficulty  Performing heavy activities around your home: A little bit of difficulty  Getting into or out of a car: A little bit of difficulty  Walking 2 blocks: A little bit of difficulty  Walking a mile: Moderate difficulty  Going up or down 10 stairs (about 1 flight of stairs): No difficulty  Standing for 1 hour: A little bit of difficulty  Sitting for 1 hour: No difficulty  Running on even ground: Moderate difficulty  Running on uneven ground: Moderate difficulty  Making sharp turns while running fast: Moderate difficulty  Hopping: Moderate difficulty  Rolling over in bed: A little bit of difficulty  Total: 61      Time Calculation:   Start Time: 1345  Stop Time: 1430  Time Calculation (min): 45 min  Therapy Charges for Today     Code Description Service Date Service Provider Modifiers Qty    47511223811  PT NEUROMUSC RE EDUCATION EA 15 MIN 2/13/2020 Pam Chavez, SORIN GP 2    [id number]  PT THER PROC  EA 15 MIN 2/13/2020 Pam Chavez, PT GP 1          PT G-Codes  Outcome Measure Options: Lower Extremity Functional Scale (LEFS)  Total: 61         Pam Chavez, PT  2/13/2020

## 2020-02-21 NOTE — THERAPY TREATMENT NOTE
Outpatient Physical Therapy Ortho Treatment Note  [facility]     Patient Name: Dustin Villeda  : 1940  MRN: [id number]  Today's Date: 2020      Visit Date: 2020    Visit Dx:    ICD-10-CM ICD-9-CM   1. History of fibula fracture Z87.81 V15.51   2. Status post ORIF of fracture of ankle Z98.890 V45.89    Z87.81 V15.51   3. Right ankle pain, unspecified chronicity M25.571 719.47   4. Difficulty walking R26.2 719.7       Patient Active Problem List   Diagnosis   • Closed right ankle fracture        Past Medical History:   Diagnosis Date   • Ankle fracture     RIGHT ANKLE    • Anxiety     ABOUT CURRENT HEALTH SITUATION    • Constipation due to opioid therapy     NO BOWEL MOVEMENT IN 5 DAYS   • Coronary artery disease    • Diabetes mellitus (CMS/HCC)    • Gout     BIG TOES   • Hyperlipidemia    • Hypertension    • Parkinson disease (CMS/HCC)         Past Surgical History:   Procedure Laterality Date   • ANKLE OPEN REDUCTION INTERNAL FIXATION Right 2019    Procedure: OPEN REDUCTION INTERNAL FIXATION RIGHT ANKLE;  Surgeon: Evgeny Keith II, MD;  Location: [facility];  Service: Orthopedics   • CATARACT EXTRACTION EXTRACAPSULAR W/ INTRAOCULAR LENS IMPLANTATION Bilateral    • COLONOSCOPY     • CORONARY ANGIOPLASTY WITH STENT PLACEMENT     • EXTRACORPOREAL SHOCK WAVE LITHOTRIPSY (ESWL)      OVER  10 YEARS AGO   • PILONIDAL CYSTECTOMY  1964                       PT Assessment/Plan     Row Name 20 1500          PT Assessment    Assessment Comments  Mr. Villeda continues to progress well toward functional goals, reporting no pain and good compliance with HEP. Focused skilled PT on improved upright posture and gait mechanics as pt tends to shuffle feet with forward flexed posture. Performed weight shifting with alt shoulder flex, marching with large arm swing, and gait train with large steps and arm swing. Pt has difficulty with reciprocal arm swing and requires breaks to  "\"reset\" and perform with opp arm/leg. Advised pt to perform gait training at home in a safe environment (such as [place] with level surface and someone else home) focusing on upright posture and larger steps, pt receptive.   -RS        PT Plan    PT Plan Comments  Plan to continue 1-2 more sessions prior to DC, focus more on standing activities and gait train.  -RS       User Key  (r) = Recorded By, (t) = Taken By, (c) = Cosigned By    Initials Name Provider Type    RS Pam Chavez, PT Physical Therapist            OP Exercises     Row Name 02/21/20 1500             Subjective Comments    Subjective Comments  Haven't had pain, been walking on treadmill 15 min at 1.4-1.5 without issues. Just feel weaker all over. Parkinson MD took off some meds yesterday. Pt reports feeling \"off\" balance wise.  -RS         Subjective Pain    Able to rate subjective pain?  yes  -RS      Pre-Treatment Pain Level  0  -RS         Total Minutes    64738 - Gait Training Minutes   10  -RS      49785 - PT Therapeutic Exercise Minutes  15  -RS      14538 -  PT Neuromuscular Reeducation Minutes  15  -RS         Exercise 1    Exercise Name 1  heel raise standing  -RS      Cueing 1  Demo  -RS      Sets 1  2  -RS      Reps 1  18  -RS      Additional Comments  first set up on B, down R, second set on B  -RS         Exercise 2    Exercise Name 2  marching slwoly  -RS      Cueing 2  Demo  -RS      Reps 2  2 laps  -RS      Time 2  with alt shoulder flex for improved upright posture  -RS      Additional Comments  for SL balance- cues for upright posture  -RS         Exercise 3    Exercise Name 3  treadmill  -RS      Cueing 3  Verbal  -RS      Reps 3  1.5  -RS      Time 3  6 min  -RS      Additional Comments  BUE support and PT supervision  -RS         Exercise 4    Exercise Name 4  weight shifts fwd/back for gait train with opp arm swing (exaggerated)  -RS      Cueing 4  Verbal;Demo  -RS      Reps 4  20 each side  -RS      Additional Comments  pt " "with marked limitation in arm swing and weight shift ability  -RS         Exercise 5    Exercise Name 5  standing gastroc stretch  -RS      Cueing 5  Verbal  -RS      Reps 5  2  -RS      Time 5  30s  -RS         Exercise 6    Exercise Name 6  gait train in hallway  -RS      Cueing 6  Verbal;Demo  -RS      Reps 6  2 long laps  -RS      Additional Comments  with \"big\" steps and \"big\" arm swing, chest up  -RS         Exercise 7    Exercise Name 7  heel/toe walk  -RS      Cueing 7  Demo  -RS      Reps 7  3 laps  -RS         Exercise 8    Exercise Name 8  seated toe extension  -RS      Cueing 8  Verbal  -RS      Reps 8  20  -RS      Time 8  plus \"splaying\"  -RS         Exercise 9    Exercise Name 9  seated heel raise for improved mobility, no pain  -RS      Cueing 9  Verbal  -RS      Reps 9  15  -RS         Exercise 10    Exercise Name 10  SL balance  -RS      Cueing 10  Verbal  -RS      Reps 10  2  -RS      Time 10  30s  -RS         Exercise 11    Exercise Name 11  tandem walk  -RS      Cueing 11  Verbal;Demo  -RS      Reps 11  2 laps  -RS      Time 11  20s ea  -RS         Exercise 12    Exercise Name 12  weight shifts foam  -RS      Cueing 12  Verbal;Demo  -RS      Time 12  2 min  -RS         Exercise 14    Exercise Name 14  step up and over  -RS      Cueing 14  Verbal;Demo  -RS      Reps 14  12  -RS      Additional Comments  6 inch  -RS         Exercise 15    Exercise Name 15  rows  -RS      Cueing 15  Verbal  -RS      Reps 15  15  -RS      Additional Comments  GTB- cues for upright posture  -RS        User Key  (r) = Recorded By, (t) = Taken By, (c) = Cosigned By    Initials Name Provider Type    RS Pam Chavez, PT Physical Therapist                       PT OP Goals     Row Name 02/21/20 1500          PT Short Term Goals    STG Date to Achieve  01/29/20  -RS     STG 1  Patient will be independent in initial HEP for ROM, strengthening and initial proprioception.  -RS     STG 1 Progress  Met  -RS     STG 2  " Patient will be instructed in swelling management, reporting compliance with techniques.   -RS     STG 2 Progress  Met  -RS     STG 3  Patient will demonstrate proper gait pattern with use of boot & cane, demonstrating no LOB with ambulation in clinic.  -RS     STG 3 Progress  Met  -RS        Long Term Goals    LTG Date to Achieve  02/26/20  -RS     LTG 1  Patient will be independent with comprehensive HEP to allow continued exercise independently.  -RS     LTG 1 Progress  Progressing  -RS     LTG 2  Patient will report improvement in function as indicated by improved LEFS score from 30/80 to 45/80 or better   -RS     LTG 2 Progress  Met  -RS     LTG 3  Patient will report no falls since initiation of physical therapy  -RS     LTG 3 Progress  Partially Met  -RS     LTG 4  Patient will restore R ankle ROM, flexibilty and LE muscle strength to restore gait on even surfaces without boot with minimal deviations without pain using cane as indicated for safety.  -RS     LTG 4 Progress  Ongoing  -RS     LTG 5  Patient will restore R ankle ROM, flexibilty and LE musculature to allow reciprocal stair-climbing without pain  -RS     LTG 5 Progress  Ongoing  -RS       User Key  (r) = Recorded By, (t) = Taken By, (c) = Cosigned By    Initials Name Provider Type    RS Pam Chavez PT Physical Therapist                         Time Calculation:   Start Time: 1504  Stop Time: 1548  Time Calculation (min): 44 min  Therapy Charges for Today     Code Description Service Date Service Provider Modifiers Qty    64487400787  PT NEUROMUSC RE EDUCATION EA 15 MIN 2/21/2020 Pam Chavez, PT GP 1    91807500278 HC PT THER PROC EA 15 MIN 2/21/2020 Pam Chavez, PT GP 1    57250139022 HC GAIT TRAINING EA 15 MIN 2/21/2020 Pam Chavez, PT GP 1                    Pam Chavez PT  2/21/2020

## 2020-02-28 NOTE — THERAPY DISCHARGE NOTE
Outpatient Physical Therapy Ortho Treatment Note/Discharge Summary   [facility]     Patient Name: Dustin Villeda  : 1940  MRN: [id number]  Today's Date: 2020      Visit Date: 2020    Visit Dx:    ICD-10-CM ICD-9-CM   1. History of fibula fracture Z87.81 V15.51   2. Status post ORIF of fracture of ankle Z98.890 V45.89    Z87.81 V15.51   3. Right ankle pain, unspecified chronicity M25.571 719.47   4. Difficulty walking R26.2 719.7       Patient Active Problem List   Diagnosis   • Closed right ankle fracture        Past Medical History:   Diagnosis Date   • Ankle fracture     RIGHT ANKLE    • Anxiety     ABOUT CURRENT HEALTH SITUATION    • Constipation due to opioid therapy     NO BOWEL MOVEMENT IN 5 DAYS   • Coronary artery disease    • Diabetes mellitus (CMS/HCC)    • Gout     BIG TOES   • Hyperlipidemia    • Hypertension    • Parkinson disease (CMS/HCC)         Past Surgical History:   Procedure Laterality Date   • ANKLE OPEN REDUCTION INTERNAL FIXATION Right 2019    Procedure: OPEN REDUCTION INTERNAL FIXATION RIGHT ANKLE;  Surgeon: Evgeny Keith II, MD;  Location: [facility];  Service: Orthopedics   • CATARACT EXTRACTION EXTRACAPSULAR W/ INTRAOCULAR LENS IMPLANTATION Bilateral    • COLONOSCOPY     • CORONARY ANGIOPLASTY WITH STENT PLACEMENT     • EXTRACORPOREAL SHOCK WAVE LITHOTRIPSY (ESWL)      OVER  10 YEARS AGO   • PILONIDAL CYSTECTOMY  1964       PT Ortho     Row Name 20 1500       Right Lower Ext    Rt Ankle Dorsiflexion AROM  7 deg  -RS    Rt Ankle Plantarflexion AROM  33  -RS    Rt Ankle Inversion AROM  17  -RS    Rt Ankle Eversion AROM  15  -RS       MMT Right Lower Ext    Rt Ankle Plantarflexion MMT, Gross Movement  (4/5) good  -RS    Rt Ankle Dorsiflexion MMT, Gross Movement  (4+/5) good plus  -RS    Rt Ankle Subtalar Inversion MT, Gross Movement  (4/5) good  -RS    Rt Ankle Subtalar Eversion MMT, Gross Movement  (4/5) good  -RS      User Key   "(r) = Recorded By, (t) = Taken By, (c) = Cosigned By    Initials Name Provider Type    RS Pam Chavez, PT Physical Therapist                              OP Exercises     Row Name 02/28/20 1500             Subjective Comments    Subjective Comments  Feeling ready to handle on own, back to 70% of PLOF.  -RS         Subjective Pain    Able to rate subjective pain?  yes  -RS      Pre-Treatment Pain Level  0  -RS         Total Minutes    90257 - Gait Training Minutes   10  -RS      20096 - PT Therapeutic Exercise Minutes  20  -RS      73708 -  PT Neuromuscular Reeducation Minutes  10  -RS         Exercise 1    Exercise Name 1  heel raise standing  -RS      Cueing 1  Demo  -RS      Sets 1  2  -RS      Reps 1  15  -RS      Additional Comments  first set up on B, down R, second set on B  -RS         Exercise 2    Exercise Name 2  marching slowly  -RS      Cueing 2  Demo  -RS      Reps 2  2 laps  -RS      Time 2  with alt shoulder flex for improved upright posture  -RS      Additional Comments  for SL balance- cues for upright posture  -RS         Exercise 3    Exercise Name 3  treadmill  -RS      Cueing 3  Verbal  -RS      Reps 3  1.5  -RS      Time 3  5 min  -RS      Additional Comments  BUE suport, 1.4 mph  -RS         Exercise 4    Exercise Name 4  --  -RS      Cueing 4  --  -RS      Reps 4  --  -RS      Additional Comments  --  -RS         Exercise 5    Exercise Name 5  standing gastroc stretch  -RS      Cueing 5  Verbal  -RS      Reps 5  2  -RS      Time 5  30s  -RS         Exercise 6    Exercise Name 6  gait train in hallway  -RS      Cueing 6  Verbal;Demo  -RS      Reps 6  2 long laps  -RS      Additional Comments  with \"big\" steps and \"big\" arm swing, chest up  -RS         Exercise 7    Exercise Name 7  heel/toe walk  -RS      Cueing 7  Demo  -RS      Reps 7  3 laps  -RS         Exercise 8    Exercise Name 8  seated toe extension  -RS      Cueing 8  Verbal  -RS      Reps 8  20  -RS      Time 8  plus " "\"splaying\"  -RS         Exercise 9    Exercise Name 9  seated heel raise for improved mobility, no pain  -RS      Cueing 9  Verbal  -RS      Reps 9  15  -RS         Exercise 10    Exercise Name 10  SL balance  -RS      Cueing 10  Verbal  -RS      Reps 10  2  -RS      Time 10  30s  -RS         Exercise 11    Exercise Name 11  tandem walk  -RS      Cueing 11  Verbal;Demo  -RS      Reps 11  3 laps  -RS      Time 11  --  -RS         Exercise 12    Exercise Name 12  weight shifts foam  -RS      Cueing 12  Verbal;Demo  -RS      Time 12  2 min  -RS         Exercise 14    Exercise Name 14  step up and over  -RS      Cueing 14  Verbal;Demo  -RS      Reps 14  10 each leg  -RS      Additional Comments  6 inch  -RS         Exercise 15    Exercise Name 15  --  -RS      Cueing 15  --  -RS      Reps 15  --  -RS        User Key  (r) = Recorded By, (t) = Taken By, (c) = Cosigned By    Initials Name Provider Type    RS Pam Chavez, PT Physical Therapist                         PT OP Goals     Row Name 02/28/20 1500          PT Short Term Goals    STG Date to Achieve  01/29/20  -RS     STG 1  Patient will be independent in initial HEP for ROM, strengthening and initial proprioception.  -RS     STG 1 Progress  Met  -RS     STG 2  Patient will be instructed in swelling management, reporting compliance with techniques.   -RS     STG 2 Progress  Met  -RS     STG 3  Patient will demonstrate proper gait pattern with use of boot & cane, demonstrating no LOB with ambulation in clinic.  -RS     STG 3 Progress  Met  -RS        Long Term Goals    LTG Date to Achieve  02/26/20  -RS     LTG 1  Patient will be independent with comprehensive HEP to allow continued exercise independently.  -RS     LTG 1 Progress  Met  -RS     LTG 2  Patient will report improvement in function as indicated by improved LEFS score from 30/80 to 45/80 or better   -RS     LTG 2 Progress  Met  -RS     LTG 3  Patient will report no falls since initiation of physical " therapy  -RS     LTG 3 Progress  Met  -RS     LTG 3 Progress Comments  reports no falls and decreased fear of falling  -RS     LTG 4  Patient will restore R ankle ROM, flexibilty and LE muscle strength to restore gait on even surfaces without boot with minimal deviations without pain using cane as indicated for safety.  -RS     LTG 4 Progress  Met  -RS     LTG 4 Progress Comments  pt able to ambulate with symmetrical gait pattern, demonstrates forward flexed posture and shorter step length  -RS     LTG 5  Patient will restore R ankle ROM, flexibilty and LE musculature to allow reciprocal stair-climbing without pain  -RS     LTG 5 Progress  Met  -RS     LTG 5 Progress Comments  pt navigating stairs reciprocally without pain  -RS       User Key  (r) = Recorded By, (t) = Taken By, (c) = Cosigned By    Initials Name Provider Type    Pam Dumont, PT Physical Therapist                         Time Calculation:   Start Time: 1445  Stop Time: 1528  Time Calculation (min): 43 min  Therapy Charges for Today     Code Description Service Date Service Provider Modifiers Qty    81257545750  PT NEUROMUSC RE EDUCATION EA 15 MIN 2/28/2020 Pam Chavez, PT GP 1    70305243459 HC PT THER PROC EA 15 MIN 2/28/2020 Pam Chavez, PT GP 1    85417179500 HC GAIT TRAINING EA 15 MIN 2/28/2020 Pam Chavez, PT GP 1                OP PT Discharge Summary  Date of Discharge: 02/28/20  Reason for Discharge: All goals achieved  Outcomes Achieved: Able to achieve all goals within established timeline  Discharge Destination: Home with home program  Discharge Instructions/Additional Comments: Mr. Villeda has been seen by PT for 11 sessions following R fibular ORIF, he has met all functional goals and demonstrates improved strength/ AROM compared to initial eval and reports return to 70% PLOF. He is appropriate for and agreeable to DC at this time.      Pam Chavez PT  2/28/2020

## 2021-01-01 ENCOUNTER — HOSPITAL ENCOUNTER (EMERGENCY)
Facility: HOSPITAL | Age: 81
Discharge: HOME | End: 2021-01-26
Attending: EMERGENCY MEDICINE | Admitting: EMERGENCY MEDICINE

## 2021-01-01 ENCOUNTER — ANESTHESIA EVENT (OUTPATIENT)
Dept: GASTROENTEROLOGY | Facility: HOSPITAL | Age: 81
End: 2021-01-01

## 2021-01-01 ENCOUNTER — APPOINTMENT (OUTPATIENT)
Dept: ULTRASOUND IMAGING | Facility: HOSPITAL | Age: 81
End: 2021-01-01

## 2021-01-01 ENCOUNTER — READMISSION MANAGEMENT (OUTPATIENT)
Dept: CALL CENTER | Facility: HOSPITAL | Age: 81
End: 2021-01-01

## 2021-01-01 ENCOUNTER — ANESTHESIA (OUTPATIENT)
Dept: GASTROENTEROLOGY | Facility: HOSPITAL | Age: 81
End: 2021-01-01

## 2021-01-01 ENCOUNTER — HOSPITAL ENCOUNTER (INPATIENT)
Facility: HOSPITAL | Age: 81
LOS: 3 days | End: 2021-02-17
Attending: EMERGENCY MEDICINE | Admitting: INTERNAL MEDICINE

## 2021-01-01 ENCOUNTER — APPOINTMENT (OUTPATIENT)
Dept: CARDIOLOGY | Facility: HOSPITAL | Age: 81
End: 2021-01-01

## 2021-01-01 ENCOUNTER — APPOINTMENT (OUTPATIENT)
Dept: GENERAL RADIOLOGY | Facility: HOSPITAL | Age: 81
End: 2021-01-01

## 2021-01-01 ENCOUNTER — HOSPITAL ENCOUNTER (INPATIENT)
Facility: HOSPITAL | Age: 81
LOS: 5 days | Discharge: HOME | End: 2021-02-04
Attending: EMERGENCY MEDICINE | Admitting: INTERNAL MEDICINE

## 2021-01-01 ENCOUNTER — APPOINTMENT (OUTPATIENT)
Dept: CT IMAGING | Facility: HOSPITAL | Age: 81
End: 2021-01-01

## 2021-01-01 ENCOUNTER — HOSPITAL ENCOUNTER (OUTPATIENT)
Dept: CARDIOLOGY | Facility: HOSPITAL | Age: 81
Discharge: HOME | End: 2021-01-27

## 2021-01-01 ENCOUNTER — TRANSCRIBE ORDERS (OUTPATIENT)
Dept: ADMINISTRATIVE | Facility: HOSPITAL | Age: 81
End: 2021-01-01

## 2021-01-01 VITALS
HEART RATE: 99 BPM | RESPIRATION RATE: 18 BRPM | SYSTOLIC BLOOD PRESSURE: 141 MMHG | TEMPERATURE: 96.3 F | OXYGEN SATURATION: 94 % | WEIGHT: 168 LBS | HEIGHT: 68 IN | BODY MASS INDEX: 25.46 KG/M2 | DIASTOLIC BLOOD PRESSURE: 90 MMHG

## 2021-01-01 VITALS
RESPIRATION RATE: 20 BRPM | WEIGHT: 156.53 LBS | BODY MASS INDEX: 23.72 KG/M2 | SYSTOLIC BLOOD PRESSURE: 131 MMHG | HEART RATE: 127 BPM | HEIGHT: 68 IN | TEMPERATURE: 98.1 F | DIASTOLIC BLOOD PRESSURE: 91 MMHG | OXYGEN SATURATION: 95 %

## 2021-01-01 VITALS
BODY MASS INDEX: 24.86 KG/M2 | HEART RATE: 45 BPM | RESPIRATION RATE: 60 BRPM | DIASTOLIC BLOOD PRESSURE: 60 MMHG | OXYGEN SATURATION: 60 % | TEMPERATURE: 96.5 F | SYSTOLIC BLOOD PRESSURE: 70 MMHG | HEIGHT: 68 IN | WEIGHT: 164.02 LBS

## 2021-01-01 VITALS — WEIGHT: 168 LBS | BODY MASS INDEX: 25.46 KG/M2 | HEIGHT: 68 IN

## 2021-01-01 DIAGNOSIS — R53.1 GENERALIZED WEAKNESS: Primary | ICD-10-CM

## 2021-01-01 DIAGNOSIS — D64.9 ANEMIA, UNSPECIFIED TYPE: ICD-10-CM

## 2021-01-01 DIAGNOSIS — I48.91 ATRIAL FIBRILLATION, UNSPECIFIED TYPE (HCC): ICD-10-CM

## 2021-01-01 DIAGNOSIS — T45.511A POISONING BY WARFARIN SODIUM, ACCIDENTAL OR UNINTENTIONAL, INITIAL ENCOUNTER: Primary | ICD-10-CM

## 2021-01-01 DIAGNOSIS — R00.1 BRADYCARDIA: ICD-10-CM

## 2021-01-01 DIAGNOSIS — K92.2 GASTROINTESTINAL HEMORRHAGE, UNSPECIFIED GASTROINTESTINAL HEMORRHAGE TYPE: ICD-10-CM

## 2021-01-01 DIAGNOSIS — R77.8 ELEVATED TROPONIN: ICD-10-CM

## 2021-01-01 DIAGNOSIS — N28.9 RENAL INSUFFICIENCY: ICD-10-CM

## 2021-01-01 DIAGNOSIS — R04.0 BLEEDING NOSE: Primary | ICD-10-CM

## 2021-01-01 DIAGNOSIS — N17.9 AKI (ACUTE KIDNEY INJURY) (HCC): ICD-10-CM

## 2021-01-01 DIAGNOSIS — I48.91 ATRIAL FIBRILLATION, UNSPECIFIED TYPE (HCC): Primary | ICD-10-CM

## 2021-01-01 DIAGNOSIS — R79.1 ELEVATED INR: ICD-10-CM

## 2021-01-01 DIAGNOSIS — N18.9 ACUTE KIDNEY INJURY SUPERIMPOSED ON CHRONIC KIDNEY DISEASE (HCC): ICD-10-CM

## 2021-01-01 DIAGNOSIS — N17.9 ACUTE KIDNEY INJURY SUPERIMPOSED ON CHRONIC KIDNEY DISEASE (HCC): ICD-10-CM

## 2021-01-01 LAB
ABO GROUP BLD: NORMAL
ALBUMIN SERPL-MCNC: 2.8 G/DL (ref 3.5–5.2)
ALBUMIN SERPL-MCNC: 3 G/DL (ref 3.5–5.2)
ALBUMIN SERPL-MCNC: 3 G/DL (ref 3.5–5.2)
ALBUMIN SERPL-MCNC: 3.3 G/DL (ref 3.5–5.2)
ALBUMIN SERPL-MCNC: 3.5 G/DL (ref 3.5–5.2)
ALBUMIN SERPL-MCNC: 4 G/DL (ref 3.5–5.2)
ALBUMIN/GLOB SERPL: 1 G/DL
ALBUMIN/GLOB SERPL: 1.2 G/DL
ALBUMIN/GLOB SERPL: 1.2 G/DL
ALBUMIN/GLOB SERPL: 1.3 G/DL
ALBUMIN/GLOB SERPL: 1.7 G/DL
ALP SERPL-CCNC: 115 U/L (ref 39–117)
ALP SERPL-CCNC: 133 U/L (ref 39–117)
ALP SERPL-CCNC: 170 U/L (ref 39–117)
ALP SERPL-CCNC: 174 U/L (ref 39–117)
ALP SERPL-CCNC: 207 U/L (ref 39–117)
ALT SERPL W P-5'-P-CCNC: 217 U/L (ref 1–41)
ALT SERPL W P-5'-P-CCNC: 42 U/L (ref 1–41)
ALT SERPL W P-5'-P-CCNC: 5 U/L (ref 1–41)
ALT SERPL W P-5'-P-CCNC: 7 U/L (ref 1–41)
ALT SERPL W P-5'-P-CCNC: <5 U/L (ref 1–41)
AMMONIA BLD-SCNC: 32 UMOL/L (ref 16–60)
ANION GAP SERPL CALCULATED.3IONS-SCNC: 12.3 MMOL/L (ref 5–15)
ANION GAP SERPL CALCULATED.3IONS-SCNC: 13 MMOL/L (ref 5–15)
ANION GAP SERPL CALCULATED.3IONS-SCNC: 13 MMOL/L (ref 5–15)
ANION GAP SERPL CALCULATED.3IONS-SCNC: 16.2 MMOL/L (ref 5–15)
ANION GAP SERPL CALCULATED.3IONS-SCNC: 16.3 MMOL/L (ref 5–15)
ANION GAP SERPL CALCULATED.3IONS-SCNC: 19.1 MMOL/L (ref 5–15)
ANION GAP SERPL CALCULATED.3IONS-SCNC: 21 MMOL/L (ref 5–15)
ANION GAP SERPL CALCULATED.3IONS-SCNC: 22.2 MMOL/L (ref 5–15)
ANION GAP SERPL CALCULATED.3IONS-SCNC: 22.2 MMOL/L (ref 5–15)
ANION GAP SERPL CALCULATED.3IONS-SCNC: 22.8 MMOL/L (ref 5–15)
ANION GAP SERPL CALCULATED.3IONS-SCNC: 32 MMOL/L (ref 5–15)
ANION GAP SERPL CALCULATED.3IONS-SCNC: 32.3 MMOL/L (ref 5–15)
AORTIC DIMENSIONLESS INDEX: 0.8 (DI)
APTT PPP: 59.6 SECONDS (ref 22.7–35.4)
APTT PPP: 61.9 SECONDS (ref 22.7–35.4)
APTT PPP: 62.5 SECONDS (ref 22.7–35.4)
APTT PPP: 67.6 SECONDS (ref 22.7–35.4)
ARTERIAL PATENCY WRIST A: POSITIVE
ARTERIAL PATENCY WRIST A: POSITIVE
ASCENDING AORTA: 3.3 CM
AST SERPL-CCNC: 1185 U/L (ref 1–40)
AST SERPL-CCNC: 29 U/L (ref 1–40)
AST SERPL-CCNC: 34 U/L (ref 1–40)
AST SERPL-CCNC: 368 U/L (ref 1–40)
AST SERPL-CCNC: 38 U/L (ref 1–40)
ATMOSPHERIC PRESS: 754.5 MMHG
ATMOSPHERIC PRESS: 756.5 MMHG
BACTERIA UR QL AUTO: ABNORMAL /HPF
BACTERIA UR QL AUTO: ABNORMAL /HPF
BASE EXCESS BLDA CALC-SCNC: -6.4 MMOL/L (ref 0–2)
BASE EXCESS BLDA CALC-SCNC: -9.6 MMOL/L (ref 0–2)
BASOPHILS # BLD AUTO: 0.04 10*3/MM3 (ref 0–0.2)
BASOPHILS # BLD AUTO: 0.05 10*3/MM3 (ref 0–0.2)
BASOPHILS # BLD AUTO: 0.07 10*3/MM3 (ref 0–0.2)
BASOPHILS # BLD MANUAL: 0.11 10*3/MM3 (ref 0–0.2)
BASOPHILS NFR BLD AUTO: 0.4 % (ref 0–1.5)
BASOPHILS NFR BLD AUTO: 0.5 % (ref 0–1.5)
BASOPHILS NFR BLD AUTO: 0.5 % (ref 0–1.5)
BASOPHILS NFR BLD AUTO: 0.6 % (ref 0–1.5)
BASOPHILS NFR BLD AUTO: 0.6 % (ref 0–1.5)
BASOPHILS NFR BLD AUTO: 1 % (ref 0–1.5)
BDY SITE: ABNORMAL
BDY SITE: ABNORMAL
BH BB BLOOD EXPIRATION DATE: NORMAL
BH BB BLOOD EXPIRATION DATE: NORMAL
BH BB BLOOD TYPE BARCODE: 9500
BH BB BLOOD TYPE BARCODE: 9500
BH BB DISPENSE STATUS: NORMAL
BH BB DISPENSE STATUS: NORMAL
BH BB PRODUCT CODE: NORMAL
BH BB PRODUCT CODE: NORMAL
BH BB UNIT NUMBER: NORMAL
BH BB UNIT NUMBER: NORMAL
BH CV ECHO MEAS - ACS: 1.9 CM
BH CV ECHO MEAS - ACS: 3.5 CM
BH CV ECHO MEAS - AO MAX PG (FULL): 5.3 MMHG
BH CV ECHO MEAS - AO MAX PG (FULL): 7.4 MMHG
BH CV ECHO MEAS - AO MAX PG: 7.6 MMHG
BH CV ECHO MEAS - AO MAX PG: 9.7 MMHG
BH CV ECHO MEAS - AO MEAN PG (FULL): 2 MMHG
BH CV ECHO MEAS - AO MEAN PG (FULL): 3.2 MMHG
BH CV ECHO MEAS - AO MEAN PG: 3 MMHG
BH CV ECHO MEAS - AO MEAN PG: 4.4 MMHG
BH CV ECHO MEAS - AO ROOT AREA (BSA CORRECTED): 1.5
BH CV ECHO MEAS - AO ROOT AREA (BSA CORRECTED): 1.9
BH CV ECHO MEAS - AO ROOT AREA: 6.2 CM^2
BH CV ECHO MEAS - AO ROOT AREA: 9.8 CM^2
BH CV ECHO MEAS - AO ROOT DIAM: 2.8 CM
BH CV ECHO MEAS - AO ROOT DIAM: 3.5 CM
BH CV ECHO MEAS - AO V2 MAX: 138 CM/SEC
BH CV ECHO MEAS - AO V2 MAX: 155.3 CM/SEC
BH CV ECHO MEAS - AO V2 MEAN: 74.8 CM/SEC
BH CV ECHO MEAS - AO V2 MEAN: 90.1 CM/SEC
BH CV ECHO MEAS - AO V2 VTI: 22.8 CM
BH CV ECHO MEAS - AO V2 VTI: 27.3 CM
BH CV ECHO MEAS - ASC AORTA: 2.5 CM
BH CV ECHO MEAS - ASC AORTA: 3.3 CM
BH CV ECHO MEAS - AVA(I,A): 1.7 CM^2
BH CV ECHO MEAS - AVA(I,A): 1.7 CM^2
BH CV ECHO MEAS - AVA(I,D): 1.7 CM^2
BH CV ECHO MEAS - AVA(I,D): 1.7 CM^2
BH CV ECHO MEAS - AVA(V,A): 1.6 CM^2
BH CV ECHO MEAS - AVA(V,A): 1.6 CM^2
BH CV ECHO MEAS - AVA(V,D): 1.6 CM^2
BH CV ECHO MEAS - AVA(V,D): 1.6 CM^2
BH CV ECHO MEAS - BSA(HAYCOCK): 1.8 M^2
BH CV ECHO MEAS - BSA(HAYCOCK): 1.9 M^2
BH CV ECHO MEAS - BSA: 1.8 M^2
BH CV ECHO MEAS - BSA: 1.9 M^2
BH CV ECHO MEAS - BZI_BMI: 23.3 KILOGRAMS/M^2
BH CV ECHO MEAS - BZI_BMI: 25.5 KILOGRAMS/M^2
BH CV ECHO MEAS - BZI_METRIC_HEIGHT: 172.7 CM
BH CV ECHO MEAS - BZI_METRIC_HEIGHT: 172.7 CM
BH CV ECHO MEAS - BZI_METRIC_WEIGHT: 69.4 KG
BH CV ECHO MEAS - BZI_METRIC_WEIGHT: 76.2 KG
BH CV ECHO MEAS - CONTRAST EF 4CH: 53.8 CM2
BH CV ECHO MEAS - EDV(CUBED): 59.3 ML
BH CV ECHO MEAS - EDV(MOD-SP2): 42 ML
BH CV ECHO MEAS - EDV(MOD-SP2): 86 ML
BH CV ECHO MEAS - EDV(MOD-SP4): 39 ML
BH CV ECHO MEAS - EDV(MOD-SP4): 61 ML
BH CV ECHO MEAS - EDV(TEICH): 114.4 ML
BH CV ECHO MEAS - EDV(TEICH): 65.9 ML
BH CV ECHO MEAS - EF(CUBED): 58.7 %
BH CV ECHO MEAS - EF(CUBED): 79.5 %
BH CV ECHO MEAS - EF(MOD-BP): 53.1 %
BH CV ECHO MEAS - EF(MOD-BP): 53.8 %
BH CV ECHO MEAS - EF(MOD-SP2): 52.4 %
BH CV ECHO MEAS - EF(MOD-SP2): 59.3 %
BH CV ECHO MEAS - EF(MOD-SP4): 50.8 %
BH CV ECHO MEAS - EF(MOD-SP4): 53.8 %
BH CV ECHO MEAS - EF(TEICH): 50.2 %
BH CV ECHO MEAS - EF(TEICH): 72.5 %
BH CV ECHO MEAS - ESV(CUBED): 12.2 ML
BH CV ECHO MEAS - ESV(MOD-SP2): 20 ML
BH CV ECHO MEAS - ESV(MOD-SP2): 35 ML
BH CV ECHO MEAS - ESV(MOD-SP4): 18 ML
BH CV ECHO MEAS - ESV(MOD-SP4): 30 ML
BH CV ECHO MEAS - ESV(TEICH): 18.1 ML
BH CV ECHO MEAS - ESV(TEICH): 57 ML
BH CV ECHO MEAS - FS: 25.6 %
BH CV ECHO MEAS - FS: 41 %
BH CV ECHO MEAS - IVS/LVPW: 1.1
BH CV ECHO MEAS - IVS/LVPW: 1.3
BH CV ECHO MEAS - IVSD: 1.1 CM
BH CV ECHO MEAS - IVSD: 1.4 CM
BH CV ECHO MEAS - LAT PEAK E' VEL: 10.7 CM/SEC
BH CV ECHO MEAS - LV DIASTOLIC VOL/BSA (35-75): 20.5 ML/M^2
BH CV ECHO MEAS - LV DIASTOLIC VOL/BSA (35-75): 33.4 ML/M^2
BH CV ECHO MEAS - LV MASS(C)D: 169.4 GRAMS
BH CV ECHO MEAS - LV MASS(C)D: 197.3 GRAMS
BH CV ECHO MEAS - LV MASS(C)DI: 104 GRAMS/M^2
BH CV ECHO MEAS - LV MASS(C)DI: 92.8 GRAMS/M^2
BH CV ECHO MEAS - LV MAX PG: 2.3 MMHG
BH CV ECHO MEAS - LV MAX PG: 2.3 MMHG
BH CV ECHO MEAS - LV MEAN PG: 1 MMHG
BH CV ECHO MEAS - LV MEAN PG: 1.1 MMHG
BH CV ECHO MEAS - LV SYSTOLIC VOL/BSA (12-30): 16.4 ML/M^2
BH CV ECHO MEAS - LV SYSTOLIC VOL/BSA (12-30): 9.5 ML/M^2
BH CV ECHO MEAS - LV V1 MAX: 75.2 CM/SEC
BH CV ECHO MEAS - LV V1 MAX: 76.3 CM/SEC
BH CV ECHO MEAS - LV V1 MEAN: 45.2 CM/SEC
BH CV ECHO MEAS - LV V1 MEAN: 47.2 CM/SEC
BH CV ECHO MEAS - LV V1 VTI: 12.3 CM
BH CV ECHO MEAS - LV V1 VTI: 16.7 CM
BH CV ECHO MEAS - LVIDD: 3.9 CM
BH CV ECHO MEAS - LVIDD: 4.9 CM
BH CV ECHO MEAS - LVIDS: 2.3 CM
BH CV ECHO MEAS - LVIDS: 3.7 CM
BH CV ECHO MEAS - LVLD AP2: 6.4 CM
BH CV ECHO MEAS - LVLD AP2: 7 CM
BH CV ECHO MEAS - LVLD AP4: 6.8 CM
BH CV ECHO MEAS - LVLD AP4: 7.1 CM
BH CV ECHO MEAS - LVLS AP2: 5.6 CM
BH CV ECHO MEAS - LVLS AP2: 6.8 CM
BH CV ECHO MEAS - LVLS AP4: 5.9 CM
BH CV ECHO MEAS - LVLS AP4: 6.3 CM
BH CV ECHO MEAS - LVOT AREA (M): 2.8 CM^2
BH CV ECHO MEAS - LVOT AREA (M): 3.1 CM^2
BH CV ECHO MEAS - LVOT AREA: 2.8 CM^2
BH CV ECHO MEAS - LVOT AREA: 3.2 CM^2
BH CV ECHO MEAS - LVOT DIAM: 1.9 CM
BH CV ECHO MEAS - LVOT DIAM: 2 CM
BH CV ECHO MEAS - LVPWD: 1 CM
BH CV ECHO MEAS - LVPWD: 1.1 CM
BH CV ECHO MEAS - MED PEAK E' VEL: 6.3 CM/SEC
BH CV ECHO MEAS - MR MAX PG: 117 MMHG
BH CV ECHO MEAS - MR MAX PG: 49.8 MMHG
BH CV ECHO MEAS - MR MAX VEL: 353 CM/SEC
BH CV ECHO MEAS - MR MAX VEL: 540.8 CM/SEC
BH CV ECHO MEAS - MV A DUR: 0.13 SEC
BH CV ECHO MEAS - MV A MAX VEL: 60.4 CM/SEC
BH CV ECHO MEAS - MV DEC SLOPE: 254 CM/SEC^2
BH CV ECHO MEAS - MV DEC SLOPE: 520.1 CM/SEC^2
BH CV ECHO MEAS - MV DEC TIME: 0.16 SEC
BH CV ECHO MEAS - MV DEC TIME: 0.31 SEC
BH CV ECHO MEAS - MV E MAX VEL: 55.6 CM/SEC
BH CV ECHO MEAS - MV E MAX VEL: 92.2 CM/SEC
BH CV ECHO MEAS - MV E/A: 0.92
BH CV ECHO MEAS - MV MAX PG: 2.5 MMHG
BH CV ECHO MEAS - MV MAX PG: 8.6 MMHG
BH CV ECHO MEAS - MV MEAN PG: 1 MMHG
BH CV ECHO MEAS - MV MEAN PG: 3.8 MMHG
BH CV ECHO MEAS - MV P1/2T MAX VEL: 90.7 CM/SEC
BH CV ECHO MEAS - MV P1/2T MAX VEL: 91.9 CM/SEC
BH CV ECHO MEAS - MV P1/2T: 106 MSEC
BH CV ECHO MEAS - MV P1/2T: 51.1 MSEC
BH CV ECHO MEAS - MV V2 MAX: 146.3 CM/SEC
BH CV ECHO MEAS - MV V2 MAX: 79.8 CM/SEC
BH CV ECHO MEAS - MV V2 MEAN: 40.9 CM/SEC
BH CV ECHO MEAS - MV V2 MEAN: 87.1 CM/SEC
BH CV ECHO MEAS - MV V2 VTI: 26.4 CM
BH CV ECHO MEAS - MV V2 VTI: 30.6 CM
BH CV ECHO MEAS - MVA P1/2T LCG: 2.4 CM^2
BH CV ECHO MEAS - MVA P1/2T LCG: 2.4 CM^2
BH CV ECHO MEAS - MVA(P1/2T): 2.1 CM^2
BH CV ECHO MEAS - MVA(P1/2T): 4.3 CM^2
BH CV ECHO MEAS - MVA(VTI): 1.5 CM^2
BH CV ECHO MEAS - MVA(VTI): 1.5 CM^2
BH CV ECHO MEAS - PA ACC TIME: 0.1 SEC
BH CV ECHO MEAS - PA MAX PG (FULL): 3 MMHG
BH CV ECHO MEAS - PA MAX PG (FULL): 3.7 MMHG
BH CV ECHO MEAS - PA MAX PG: 4 MMHG
BH CV ECHO MEAS - PA MAX PG: 5.3 MMHG
BH CV ECHO MEAS - PA PR(ACCEL): 36.3 MMHG
BH CV ECHO MEAS - PA V2 MAX: 100 CM/SEC
BH CV ECHO MEAS - PA V2 MAX: 115.4 CM/SEC
BH CV ECHO MEAS - PULM DIAS VEL: 36.3 CM/SEC
BH CV ECHO MEAS - PULM S/D: 0.73
BH CV ECHO MEAS - PULM SYS VEL: 26.6 CM/SEC
BH CV ECHO MEAS - PVA(V,A): 1.6 CM^2
BH CV ECHO MEAS - PVA(V,A): 2.4 CM^2
BH CV ECHO MEAS - PVA(V,D): 1.6 CM^2
BH CV ECHO MEAS - PVA(V,D): 2.4 CM^2
BH CV ECHO MEAS - QP/QS: 0.88
BH CV ECHO MEAS - QP/QS: 1.1
BH CV ECHO MEAS - RAP SYSTOLE: 5 MMHG
BH CV ECHO MEAS - RAP SYSTOLE: 8 MMHG
BH CV ECHO MEAS - RV MAX PG: 1 MMHG
BH CV ECHO MEAS - RV MAX PG: 1.6 MMHG
BH CV ECHO MEAS - RV MEAN PG: 0.87 MMHG
BH CV ECHO MEAS - RV MEAN PG: 1 MMHG
BH CV ECHO MEAS - RV V1 MAX: 50.3 CM/SEC
BH CV ECHO MEAS - RV V1 MAX: 63.4 CM/SEC
BH CV ECHO MEAS - RV V1 MEAN: 33.4 CM/SEC
BH CV ECHO MEAS - RV V1 MEAN: 41.8 CM/SEC
BH CV ECHO MEAS - RV V1 VTI: 10.4 CM
BH CV ECHO MEAS - RV V1 VTI: 13.3 CM
BH CV ECHO MEAS - RVOT AREA: 3.1 CM^2
BH CV ECHO MEAS - RVOT AREA: 4.3 CM^2
BH CV ECHO MEAS - RVOT DIAM: 2 CM
BH CV ECHO MEAS - RVOT DIAM: 2.3 CM
BH CV ECHO MEAS - RVSP: 28.2 MMHG
BH CV ECHO MEAS - RVSP: 44.4 MMHG
BH CV ECHO MEAS - SI(AO): 117.5 ML/M^2
BH CV ECHO MEAS - SI(AO): 92.2 ML/M^2
BH CV ECHO MEAS - SI(CUBED): 25.9 ML/M^2
BH CV ECHO MEAS - SI(CUBED): 37.1 ML/M^2
BH CV ECHO MEAS - SI(LVOT): 20.9 ML/M^2
BH CV ECHO MEAS - SI(LVOT): 26 ML/M^2
BH CV ECHO MEAS - SI(MOD-SP2): 11.6 ML/M^2
BH CV ECHO MEAS - SI(MOD-SP2): 28 ML/M^2
BH CV ECHO MEAS - SI(MOD-SP4): 11.1 ML/M^2
BH CV ECHO MEAS - SI(MOD-SP4): 17 ML/M^2
BH CV ECHO MEAS - SI(TEICH): 26.2 ML/M^2
BH CV ECHO MEAS - SI(TEICH): 30.2 ML/M^2
BH CV ECHO MEAS - SV(AO): 168.1 ML
BH CV ECHO MEAS - SV(AO): 222.9 ML
BH CV ECHO MEAS - SV(CUBED): 47.2 ML
BH CV ECHO MEAS - SV(CUBED): 70.3 ML
BH CV ECHO MEAS - SV(LVOT): 39.7 ML
BH CV ECHO MEAS - SV(LVOT): 47.3 ML
BH CV ECHO MEAS - SV(MOD-SP2): 22 ML
BH CV ECHO MEAS - SV(MOD-SP2): 51 ML
BH CV ECHO MEAS - SV(MOD-SP4): 21 ML
BH CV ECHO MEAS - SV(MOD-SP4): 31 ML
BH CV ECHO MEAS - SV(RVOT): 41.8 ML
BH CV ECHO MEAS - SV(RVOT): 44.9 ML
BH CV ECHO MEAS - SV(TEICH): 47.8 ML
BH CV ECHO MEAS - SV(TEICH): 57.4 ML
BH CV ECHO MEAS - TAPSE (>1.6): 1.9 CM
BH CV ECHO MEAS - TAPSE (>1.6): 2.1 CM
BH CV ECHO MEAS - TR MAX VEL: 241 CM/SEC
BH CV ECHO MEAS - TR MAX VEL: 301.6 CM/SEC
BH CV ECHO MEASUREMENTS AVERAGE E/E' RATIO: 6.54
BH CV XLRA - RV BASE: 3.1 CM
BH CV XLRA - RV BASE: 4.3 CM
BH CV XLRA - RV LENGTH: 7 CM
BH CV XLRA - RV MID: 2.4 CM
BH CV XLRA - TDI S': 13.7 CM/SEC
BH CV XLRA - TDI S': 14.4 CM/SEC
BILIRUB SERPL-MCNC: 0.6 MG/DL (ref 0–1.2)
BILIRUB SERPL-MCNC: 0.8 MG/DL (ref 0–1.2)
BILIRUB SERPL-MCNC: 1.1 MG/DL (ref 0–1.2)
BILIRUB SERPL-MCNC: 1.4 MG/DL (ref 0–1.2)
BILIRUB SERPL-MCNC: 1.5 MG/DL (ref 0–1.2)
BILIRUB UR QL STRIP: NEGATIVE
BLD GP AB SCN SERPL QL: NEGATIVE
BUN SERPL-MCNC: 101 MG/DL (ref 8–23)
BUN SERPL-MCNC: 15 MG/DL (ref 8–23)
BUN SERPL-MCNC: 26 MG/DL (ref 8–23)
BUN SERPL-MCNC: 26 MG/DL (ref 8–23)
BUN SERPL-MCNC: 31 MG/DL (ref 8–23)
BUN SERPL-MCNC: 31 MG/DL (ref 8–23)
BUN SERPL-MCNC: 36 MG/DL (ref 8–23)
BUN SERPL-MCNC: 42 MG/DL (ref 8–23)
BUN SERPL-MCNC: 51 MG/DL (ref 8–23)
BUN SERPL-MCNC: 62 MG/DL (ref 8–23)
BUN SERPL-MCNC: 63 MG/DL (ref 8–23)
BUN SERPL-MCNC: 75 MG/DL (ref 8–23)
BUN/CREAT SERPL: 10.1 (ref 7–25)
BUN/CREAT SERPL: 10.6 (ref 7–25)
BUN/CREAT SERPL: 10.6 (ref 7–25)
BUN/CREAT SERPL: 11.2 (ref 7–25)
BUN/CREAT SERPL: 11.2 (ref 7–25)
BUN/CREAT SERPL: 11.5 (ref 7–25)
BUN/CREAT SERPL: 11.6 (ref 7–25)
BUN/CREAT SERPL: 12.2 (ref 7–25)
BUN/CREAT SERPL: 12.7 (ref 7–25)
BUN/CREAT SERPL: 25.4 (ref 7–25)
BUN/CREAT SERPL: 37.5 (ref 7–25)
BUN/CREAT SERPL: 48.1 (ref 7–25)
BUN/CREAT SERPL: 49.8 (ref 7–25)
BUN/CREAT SERPL: 9.6 (ref 7–25)
C-ANCA TITR SER IF: NORMAL TITER
C3 SERPL-MCNC: 123 MG/DL (ref 82–167)
C4 SERPL-MCNC: 17 MG/DL (ref 12–38)
CA-I BLD-MCNC: 3.7 MG/DL (ref 4.6–5.4)
CA-I BLD-MCNC: 3.8 MG/DL (ref 4.6–5.4)
CA-I BLD-MCNC: 4 MG/DL (ref 4.6–5.4)
CA-I SERPL ISE-MCNC: 0.92 MMOL/L (ref 1.15–1.35)
CA-I SERPL ISE-MCNC: 0.94 MMOL/L (ref 1.15–1.35)
CA-I SERPL ISE-MCNC: 1.01 MMOL/L (ref 1.15–1.35)
CALCIUM SPEC-SCNC: 6.9 MG/DL (ref 8.6–10.5)
CALCIUM SPEC-SCNC: 7.3 MG/DL (ref 8.6–10.5)
CALCIUM SPEC-SCNC: 7.4 MG/DL (ref 8.6–10.5)
CALCIUM SPEC-SCNC: 7.4 MG/DL (ref 8.6–10.5)
CALCIUM SPEC-SCNC: 8.4 MG/DL (ref 8.6–10.5)
CALCIUM SPEC-SCNC: 8.5 MG/DL (ref 8.6–10.5)
CALCIUM SPEC-SCNC: 8.6 MG/DL (ref 8.6–10.5)
CALCIUM SPEC-SCNC: 8.7 MG/DL (ref 8.6–10.5)
CALCIUM SPEC-SCNC: 8.8 MG/DL (ref 8.6–10.5)
CALCIUM SPEC-SCNC: 9 MG/DL (ref 8.6–10.5)
CALCIUM SPEC-SCNC: 9.2 MG/DL (ref 8.6–10.5)
CALCIUM SPEC-SCNC: 9.6 MG/DL (ref 8.6–10.5)
CHLORIDE SERPL-SCNC: 100 MMOL/L (ref 98–107)
CHLORIDE SERPL-SCNC: 100 MMOL/L (ref 98–107)
CHLORIDE SERPL-SCNC: 102 MMOL/L (ref 98–107)
CHLORIDE SERPL-SCNC: 105 MMOL/L (ref 98–107)
CHLORIDE SERPL-SCNC: 107 MMOL/L (ref 98–107)
CHLORIDE SERPL-SCNC: 108 MMOL/L (ref 98–107)
CHLORIDE SERPL-SCNC: 89 MMOL/L (ref 98–107)
CHLORIDE SERPL-SCNC: 94 MMOL/L (ref 98–107)
CHLORIDE SERPL-SCNC: 94 MMOL/L (ref 98–107)
CHLORIDE SERPL-SCNC: 96 MMOL/L (ref 98–107)
CHLORIDE SERPL-SCNC: 98 MMOL/L (ref 98–107)
CHLORIDE SERPL-SCNC: 99 MMOL/L (ref 98–107)
CHOLEST SERPL-MCNC: 67 MG/DL (ref 0–200)
CK SERPL-CCNC: 1474 U/L (ref 20–200)
CLARITY UR: ABNORMAL
CLARITY UR: ABNORMAL
CLARITY UR: CLEAR
CO2 SERPL-SCNC: 10.7 MMOL/L (ref 22–29)
CO2 SERPL-SCNC: 11 MMOL/L (ref 22–29)
CO2 SERPL-SCNC: 18 MMOL/L (ref 22–29)
CO2 SERPL-SCNC: 18.8 MMOL/L (ref 22–29)
CO2 SERPL-SCNC: 18.8 MMOL/L (ref 22–29)
CO2 SERPL-SCNC: 19 MMOL/L (ref 22–29)
CO2 SERPL-SCNC: 19.2 MMOL/L (ref 22–29)
CO2 SERPL-SCNC: 19.7 MMOL/L (ref 22–29)
CO2 SERPL-SCNC: 19.9 MMOL/L (ref 22–29)
CO2 SERPL-SCNC: 20 MMOL/L (ref 22–29)
CO2 SERPL-SCNC: 20.7 MMOL/L (ref 22–29)
CO2 SERPL-SCNC: 22.8 MMOL/L (ref 22–29)
CO2 SERPL-SCNC: 7.7 MMOL/L (ref 22–29)
CO2 SERPL-SCNC: 7.7 MMOL/L (ref 22–29)
COLOR UR: ABNORMAL
COLOR UR: ABNORMAL
COLOR UR: YELLOW
CORTIS SERPL-MCNC: 44.74 MCG/DL
CREAT SERPL-MCNC: 1.22 MG/DL (ref 0.76–1.27)
CREAT SERPL-MCNC: 1.36 MG/DL (ref 0.76–1.27)
CREAT SERPL-MCNC: 1.56 MG/DL (ref 0.76–1.27)
CREAT SERPL-MCNC: 1.57 MG/DL (ref 0.76–1.27)
CREAT SERPL-MCNC: 2.03 MG/DL (ref 0.76–1.27)
CREAT SERPL-MCNC: 2.44 MG/DL (ref 0.76–1.27)
CREAT SERPL-MCNC: 2.46 MG/DL (ref 0.76–1.27)
CREAT SERPL-MCNC: 2.46 MG/DL (ref 0.76–1.27)
CREAT SERPL-MCNC: 3.44 MG/DL (ref 0.76–1.27)
CREAT SERPL-MCNC: 3.55 MG/DL (ref 0.76–1.27)
CREAT SERPL-MCNC: 5.41 MG/DL (ref 0.76–1.27)
CREAT SERPL-MCNC: 5.42 MG/DL (ref 0.76–1.27)
CREAT SERPL-MCNC: 5.53 MG/DL (ref 0.76–1.27)
CREAT SERPL-MCNC: 5.53 MG/DL (ref 0.76–1.27)
CROSSMATCH INTERPRETATION: NORMAL
CROSSMATCH INTERPRETATION: NORMAL
D-LACTATE SERPL-SCNC: 10.2 MMOL/L (ref 0.5–2)
D-LACTATE SERPL-SCNC: 10.2 MMOL/L (ref 0.5–2)
D-LACTATE SERPL-SCNC: 12.7 MMOL/L (ref 0.5–2)
D-LACTATE SERPL-SCNC: 12.7 MMOL/L (ref 0.5–2)
DEPRECATED RDW RBC AUTO: 51.8 FL (ref 37–54)
DEPRECATED RDW RBC AUTO: 52.8 FL (ref 37–54)
DEPRECATED RDW RBC AUTO: 53 FL (ref 37–54)
DEPRECATED RDW RBC AUTO: 53.4 FL (ref 37–54)
DEPRECATED RDW RBC AUTO: 53.6 FL (ref 37–54)
DEPRECATED RDW RBC AUTO: 54.2 FL (ref 37–54)
DEPRECATED RDW RBC AUTO: 55.2 FL (ref 37–54)
DEPRECATED RDW RBC AUTO: 55.4 FL (ref 37–54)
DEPRECATED RDW RBC AUTO: 55.7 FL (ref 37–54)
DEPRECATED RDW RBC AUTO: 55.8 FL (ref 37–54)
DEPRECATED RDW RBC AUTO: 56.8 FL (ref 37–54)
DEPRECATED RDW RBC AUTO: 57 FL (ref 37–54)
DEPRECATED RDW RBC AUTO: 57.2 FL (ref 37–54)
DEPRECATED RDW RBC AUTO: 59.3 FL (ref 37–54)
DEPRECATED RDW RBC AUTO: 60.2 FL (ref 37–54)
DEPRECATED RDW RBC AUTO: 61.8 FL (ref 37–54)
DEPRECATED RDW RBC AUTO: 62.8 FL (ref 37–54)
DSDNA AB SER-ACNC: <1 IU/ML (ref 0–9)
EOSINOPHIL # BLD AUTO: 0.07 10*3/MM3 (ref 0–0.4)
EOSINOPHIL # BLD AUTO: 0.14 10*3/MM3 (ref 0–0.4)
EOSINOPHIL # BLD AUTO: 0.26 10*3/MM3 (ref 0–0.4)
EOSINOPHIL # BLD AUTO: 0.34 10*3/MM3 (ref 0–0.4)
EOSINOPHIL # BLD AUTO: 0.42 10*3/MM3 (ref 0–0.4)
EOSINOPHIL NFR BLD AUTO: 0.9 % (ref 0.3–6.2)
EOSINOPHIL NFR BLD AUTO: 1.6 % (ref 0.3–6.2)
EOSINOPHIL NFR BLD AUTO: 2.3 % (ref 0.3–6.2)
EOSINOPHIL NFR BLD AUTO: 3.2 % (ref 0.3–6.2)
EOSINOPHIL NFR BLD AUTO: 3.6 % (ref 0.3–6.2)
ERYTHROCYTE [DISTWIDTH] IN BLOOD BY AUTOMATED COUNT: 14.5 % (ref 12.3–15.4)
ERYTHROCYTE [DISTWIDTH] IN BLOOD BY AUTOMATED COUNT: 14.8 % (ref 12.3–15.4)
ERYTHROCYTE [DISTWIDTH] IN BLOOD BY AUTOMATED COUNT: 14.9 % (ref 12.3–15.4)
ERYTHROCYTE [DISTWIDTH] IN BLOOD BY AUTOMATED COUNT: 15.1 % (ref 12.3–15.4)
ERYTHROCYTE [DISTWIDTH] IN BLOOD BY AUTOMATED COUNT: 15.4 % (ref 12.3–15.4)
ERYTHROCYTE [DISTWIDTH] IN BLOOD BY AUTOMATED COUNT: 15.8 % (ref 12.3–15.4)
ERYTHROCYTE [DISTWIDTH] IN BLOOD BY AUTOMATED COUNT: 15.8 % (ref 12.3–15.4)
ERYTHROCYTE [DISTWIDTH] IN BLOOD BY AUTOMATED COUNT: 15.9 % (ref 12.3–15.4)
ERYTHROCYTE [DISTWIDTH] IN BLOOD BY AUTOMATED COUNT: 16.1 % (ref 12.3–15.4)
ERYTHROCYTE [DISTWIDTH] IN BLOOD BY AUTOMATED COUNT: 16.1 % (ref 12.3–15.4)
ERYTHROCYTE [DISTWIDTH] IN BLOOD BY AUTOMATED COUNT: 16.4 % (ref 12.3–15.4)
ERYTHROCYTE [DISTWIDTH] IN BLOOD BY AUTOMATED COUNT: 16.5 % (ref 12.3–15.4)
ERYTHROCYTE [DISTWIDTH] IN BLOOD BY AUTOMATED COUNT: 16.9 % (ref 12.3–15.4)
EXPIRATION DATE: ABNORMAL
FECAL OCCULT BLOOD SCREEN, POC: POSITIVE
FERRITIN SERPL-MCNC: 205 NG/ML (ref 30–400)
FIBRINOGEN PPP-MCNC: 348 MG/DL (ref 219–464)
FOLATE SERPL-MCNC: 7.33 NG/ML (ref 4.78–24.2)
FSP PPP LA-ACNC: ABNORMAL
GAS FLOW AIRWAY: 2.5 LPM
GFR SERPL CREATININE-BSD FRML MDRD: 10 ML/MIN/1.73
GFR SERPL CREATININE-BSD FRML MDRD: 17 ML/MIN/1.73
GFR SERPL CREATININE-BSD FRML MDRD: 17 ML/MIN/1.73
GFR SERPL CREATININE-BSD FRML MDRD: 26 ML/MIN/1.73
GFR SERPL CREATININE-BSD FRML MDRD: 32 ML/MIN/1.73
GFR SERPL CREATININE-BSD FRML MDRD: 43 ML/MIN/1.73
GFR SERPL CREATININE-BSD FRML MDRD: 43 ML/MIN/1.73
GFR SERPL CREATININE-BSD FRML MDRD: 51 ML/MIN/1.73
GFR SERPL CREATININE-BSD FRML MDRD: 57 ML/MIN/1.73
GFR SERPL CREATININE-BSD FRML MDRD: ABNORMAL ML/MIN/{1.73_M2}
GLOBULIN UR ELPH-MCNC: 2.3 GM/DL
GLOBULIN UR ELPH-MCNC: 2.4 GM/DL
GLOBULIN UR ELPH-MCNC: 2.7 GM/DL
GLOBULIN UR ELPH-MCNC: 2.8 GM/DL
GLOBULIN UR ELPH-MCNC: 2.8 GM/DL
GLUCOSE BLDC GLUCOMTR-MCNC: 102 MG/DL (ref 70–130)
GLUCOSE BLDC GLUCOMTR-MCNC: 107 MG/DL (ref 70–130)
GLUCOSE BLDC GLUCOMTR-MCNC: 114 MG/DL (ref 70–130)
GLUCOSE BLDC GLUCOMTR-MCNC: 118 MG/DL (ref 70–130)
GLUCOSE BLDC GLUCOMTR-MCNC: 121 MG/DL (ref 70–130)
GLUCOSE BLDC GLUCOMTR-MCNC: 123 MG/DL (ref 70–130)
GLUCOSE BLDC GLUCOMTR-MCNC: 123 MG/DL (ref 70–130)
GLUCOSE BLDC GLUCOMTR-MCNC: 126 MG/DL (ref 70–130)
GLUCOSE BLDC GLUCOMTR-MCNC: 127 MG/DL (ref 70–130)
GLUCOSE BLDC GLUCOMTR-MCNC: 129 MG/DL (ref 70–130)
GLUCOSE BLDC GLUCOMTR-MCNC: 129 MG/DL (ref 70–130)
GLUCOSE BLDC GLUCOMTR-MCNC: 133 MG/DL (ref 70–130)
GLUCOSE BLDC GLUCOMTR-MCNC: 136 MG/DL (ref 70–130)
GLUCOSE BLDC GLUCOMTR-MCNC: 137 MG/DL (ref 70–130)
GLUCOSE BLDC GLUCOMTR-MCNC: 139 MG/DL (ref 70–130)
GLUCOSE BLDC GLUCOMTR-MCNC: 152 MG/DL (ref 70–130)
GLUCOSE BLDC GLUCOMTR-MCNC: 157 MG/DL (ref 70–130)
GLUCOSE BLDC GLUCOMTR-MCNC: 159 MG/DL (ref 70–130)
GLUCOSE BLDC GLUCOMTR-MCNC: 161 MG/DL (ref 70–130)
GLUCOSE BLDC GLUCOMTR-MCNC: 164 MG/DL (ref 70–130)
GLUCOSE BLDC GLUCOMTR-MCNC: 165 MG/DL (ref 70–130)
GLUCOSE BLDC GLUCOMTR-MCNC: 166 MG/DL (ref 70–130)
GLUCOSE BLDC GLUCOMTR-MCNC: 166 MG/DL (ref 70–130)
GLUCOSE BLDC GLUCOMTR-MCNC: 174 MG/DL (ref 70–130)
GLUCOSE BLDC GLUCOMTR-MCNC: 178 MG/DL (ref 70–130)
GLUCOSE BLDC GLUCOMTR-MCNC: 208 MG/DL (ref 70–130)
GLUCOSE BLDC GLUCOMTR-MCNC: 224 MG/DL (ref 70–130)
GLUCOSE BLDC GLUCOMTR-MCNC: 244 MG/DL (ref 70–130)
GLUCOSE BLDC GLUCOMTR-MCNC: 277 MG/DL (ref 70–130)
GLUCOSE BLDC GLUCOMTR-MCNC: 28 MG/DL (ref 70–130)
GLUCOSE BLDC GLUCOMTR-MCNC: 31 MG/DL (ref 70–130)
GLUCOSE BLDC GLUCOMTR-MCNC: 52 MG/DL (ref 70–130)
GLUCOSE BLDC GLUCOMTR-MCNC: 72 MG/DL (ref 70–130)
GLUCOSE BLDC GLUCOMTR-MCNC: 98 MG/DL (ref 70–130)
GLUCOSE SERPL-MCNC: 123 MG/DL (ref 65–99)
GLUCOSE SERPL-MCNC: 126 MG/DL (ref 65–99)
GLUCOSE SERPL-MCNC: 136 MG/DL (ref 65–99)
GLUCOSE SERPL-MCNC: 137 MG/DL (ref 65–99)
GLUCOSE SERPL-MCNC: 139 MG/DL (ref 65–99)
GLUCOSE SERPL-MCNC: 139 MG/DL (ref 65–99)
GLUCOSE SERPL-MCNC: 147 MG/DL (ref 65–99)
GLUCOSE SERPL-MCNC: 171 MG/DL (ref 65–99)
GLUCOSE SERPL-MCNC: 215 MG/DL (ref 65–99)
GLUCOSE SERPL-MCNC: 236 MG/DL (ref 65–99)
GLUCOSE SERPL-MCNC: 236 MG/DL (ref 65–99)
GLUCOSE SERPL-MCNC: 296 MG/DL (ref 65–99)
GLUCOSE SERPL-MCNC: 39 MG/DL (ref 65–99)
GLUCOSE SERPL-MCNC: 85 MG/DL (ref 65–99)
GLUCOSE UR STRIP-MCNC: NEGATIVE MG/DL
GRAN CASTS URNS QL MICRO: ABNORMAL /LPF
HAPTOGLOB SERPL-MCNC: 83 MG/DL (ref 30–200)
HAV IGM SERPL QL IA: NORMAL
HBA1C MFR BLD: 6.04 % (ref 4.8–5.6)
HBA1C MFR BLD: 6.2 % (ref 4.8–5.6)
HBV CORE IGM SERPL QL IA: NORMAL
HBV SURFACE AG SERPL QL IA: NORMAL
HBV SURFACE AG SERPL QL IA: NORMAL
HCO3 BLDA-SCNC: 16.1 MMOL/L (ref 22–28)
HCO3 BLDA-SCNC: 16.5 MMOL/L (ref 22–28)
HCT VFR BLD AUTO: 24.7 % (ref 37.5–51)
HCT VFR BLD AUTO: 24.9 % (ref 37.5–51)
HCT VFR BLD AUTO: 25.2 % (ref 37.5–51)
HCT VFR BLD AUTO: 25.6 % (ref 37.5–51)
HCT VFR BLD AUTO: 25.8 % (ref 37.5–51)
HCT VFR BLD AUTO: 26.4 % (ref 37.5–51)
HCT VFR BLD AUTO: 26.5 % (ref 37.5–51)
HCT VFR BLD AUTO: 28.2 % (ref 37.5–51)
HCT VFR BLD AUTO: 29 % (ref 37.5–51)
HCT VFR BLD AUTO: 29.1 % (ref 37.5–51)
HCT VFR BLD AUTO: 29.5 % (ref 37.5–51)
HCT VFR BLD AUTO: 30.1 % (ref 37.5–51)
HCT VFR BLD AUTO: 33.6 % (ref 37.5–51)
HCT VFR BLD AUTO: 34.3 % (ref 37.5–51)
HCT VFR BLD AUTO: 37.2 % (ref 37.5–51)
HCT VFR BLD AUTO: 39.2 % (ref 37.5–51)
HCT VFR BLD AUTO: 39.4 % (ref 37.5–51)
HCV AB SER DONR QL: NORMAL
HDLC SERPL-MCNC: 30 MG/DL (ref 40–60)
HGB BLD-MCNC: 10.1 G/DL (ref 13–17.7)
HGB BLD-MCNC: 10.6 G/DL (ref 13–17.7)
HGB BLD-MCNC: 10.9 G/DL (ref 13–17.7)
HGB BLD-MCNC: 11.6 G/DL (ref 13–17.7)
HGB BLD-MCNC: 12 G/DL (ref 13–17.7)
HGB BLD-MCNC: 13.2 G/DL (ref 13–17.7)
HGB BLD-MCNC: 8.3 G/DL (ref 13–17.7)
HGB BLD-MCNC: 8.5 G/DL (ref 13–17.7)
HGB BLD-MCNC: 8.6 G/DL (ref 13–17.7)
HGB BLD-MCNC: 8.6 G/DL (ref 13–17.7)
HGB BLD-MCNC: 8.7 G/DL (ref 13–17.7)
HGB BLD-MCNC: 8.8 G/DL (ref 13–17.7)
HGB BLD-MCNC: 8.9 G/DL (ref 13–17.7)
HGB BLD-MCNC: 9.4 G/DL (ref 13–17.7)
HGB BLD-MCNC: 9.4 G/DL (ref 13–17.7)
HGB BLD-MCNC: 9.5 G/DL (ref 13–17.7)
HGB BLD-MCNC: 9.6 G/DL (ref 13–17.7)
HGB UR QL STRIP.AUTO: ABNORMAL
HGB UR QL STRIP.AUTO: ABNORMAL
HGB UR QL STRIP.AUTO: NEGATIVE
HOLD SPECIMEN: NORMAL
HYALINE CASTS UR QL AUTO: ABNORMAL /LPF
HYALINE CASTS UR QL AUTO: ABNORMAL /LPF
IGA SERPL-MCNC: 552 MG/DL (ref 61–437)
IGG SERPL-MCNC: 922 MG/DL (ref 603–1613)
IGM SERPL-MCNC: 40 MG/DL (ref 15–143)
IMM GRANULOCYTES # BLD AUTO: 0.03 10*3/MM3 (ref 0–0.05)
IMM GRANULOCYTES # BLD AUTO: 0.03 10*3/MM3 (ref 0–0.05)
IMM GRANULOCYTES # BLD AUTO: 0.04 10*3/MM3 (ref 0–0.05)
IMM GRANULOCYTES # BLD AUTO: 0.16 10*3/MM3 (ref 0–0.05)
IMM GRANULOCYTES # BLD AUTO: 0.21 10*3/MM3 (ref 0–0.05)
IMM GRANULOCYTES NFR BLD AUTO: 0.4 % (ref 0–0.5)
IMM GRANULOCYTES NFR BLD AUTO: 1.4 % (ref 0–0.5)
IMM GRANULOCYTES NFR BLD AUTO: 1.4 % (ref 0–0.5)
INR PPP: 1.18 (ref 0.9–1.1)
INR PPP: 1.3 (ref 0.9–1.1)
INR PPP: >10 (ref 0.9–1.1)
IRON 24H UR-MRATE: 33 MCG/DL (ref 59–158)
IRON SATN MFR SERPL: 9 % (ref 20–50)
KETONES UR QL STRIP: ABNORMAL
KOH PREP NAIL: ABNORMAL
LACTATE HOLD SPECIMEN: NORMAL
LDH SERPL-CCNC: 668 U/L (ref 135–225)
LDLC SERPL CALC-MCNC: 20 MG/DL (ref 0–100)
LDLC/HDLC SERPL: 0.71 {RATIO}
LEFT ATRIUM VOLUME INDEX: 28.4 ML/M2
LEFT ATRIUM VOLUME INDEX: 41 ML/M2
LEUKOCYTE ESTERASE UR QL STRIP.AUTO: NEGATIVE
LV EF 2D ECHO EST: 53 %
LYMPHOCYTES # BLD AUTO: 1.42 10*3/MM3 (ref 0.7–3.1)
LYMPHOCYTES # BLD AUTO: 1.51 10*3/MM3 (ref 0.7–3.1)
LYMPHOCYTES # BLD AUTO: 1.57 10*3/MM3 (ref 0.7–3.1)
LYMPHOCYTES # BLD AUTO: 1.93 10*3/MM3 (ref 0.7–3.1)
LYMPHOCYTES # BLD AUTO: 2.32 10*3/MM3 (ref 0.7–3.1)
LYMPHOCYTES # BLD MANUAL: 0.2 10*3/MM3 (ref 0.7–3.1)
LYMPHOCYTES # BLD MANUAL: 1.03 10*3/MM3 (ref 0.7–3.1)
LYMPHOCYTES NFR BLD AUTO: 15.7 % (ref 19.6–45.3)
LYMPHOCYTES NFR BLD AUTO: 16 % (ref 19.6–45.3)
LYMPHOCYTES NFR BLD AUTO: 16.3 % (ref 19.6–45.3)
LYMPHOCYTES NFR BLD AUTO: 19.6 % (ref 19.6–45.3)
LYMPHOCYTES NFR BLD AUTO: 19.9 % (ref 19.6–45.3)
LYMPHOCYTES NFR BLD MANUAL: 3 % (ref 19.6–45.3)
LYMPHOCYTES NFR BLD MANUAL: 3.1 % (ref 5–12)
LYMPHOCYTES NFR BLD MANUAL: 8 % (ref 5–12)
LYMPHOCYTES NFR BLD MANUAL: 9.2 % (ref 19.6–45.3)
Lab: 154
MACROCYTES BLD QL SMEAR: ABNORMAL
MAGNESIUM SERPL-MCNC: 1.5 MG/DL (ref 1.6–2.4)
MAGNESIUM SERPL-MCNC: 1.8 MG/DL (ref 1.6–2.4)
MAGNESIUM SERPL-MCNC: 1.8 MG/DL (ref 1.6–2.4)
MAGNESIUM SERPL-MCNC: 2 MG/DL (ref 1.6–2.4)
MAGNESIUM SERPL-MCNC: 2 MG/DL (ref 1.6–2.4)
MAGNESIUM SERPL-MCNC: 2.1 MG/DL (ref 1.6–2.4)
MAGNESIUM SERPL-MCNC: 2.1 MG/DL (ref 1.6–2.4)
MAGNESIUM SERPL-MCNC: 2.4 MG/DL (ref 1.6–2.4)
MAXIMAL PREDICTED HEART RATE: 142 BPM
MAXIMAL PREDICTED HEART RATE: 142 BPM
MCH RBC QN AUTO: 32.1 PG (ref 26.6–33)
MCH RBC QN AUTO: 32.3 PG (ref 26.6–33)
MCH RBC QN AUTO: 32.4 PG (ref 26.6–33)
MCH RBC QN AUTO: 33 PG (ref 26.6–33)
MCH RBC QN AUTO: 33.1 PG (ref 26.6–33)
MCH RBC QN AUTO: 33.1 PG (ref 26.6–33)
MCH RBC QN AUTO: 33.2 PG (ref 26.6–33)
MCH RBC QN AUTO: 33.3 PG (ref 26.6–33)
MCH RBC QN AUTO: 33.4 PG (ref 26.6–33)
MCH RBC QN AUTO: 33.5 PG (ref 26.6–33)
MCH RBC QN AUTO: 33.5 PG (ref 26.6–33)
MCH RBC QN AUTO: 33.8 PG (ref 26.6–33)
MCH RBC QN AUTO: 34.1 PG (ref 26.6–33)
MCH RBC QN AUTO: 34.3 PG (ref 26.6–33)
MCH RBC QN AUTO: 34.4 PG (ref 26.6–33)
MCH RBC QN AUTO: 34.6 PG (ref 26.6–33)
MCH RBC QN AUTO: 34.7 PG (ref 26.6–33)
MCHC RBC AUTO-ENTMCNC: 30.6 G/DL (ref 31.5–35.7)
MCHC RBC AUTO-ENTMCNC: 30.9 G/DL (ref 31.5–35.7)
MCHC RBC AUTO-ENTMCNC: 31.2 G/DL (ref 31.5–35.7)
MCHC RBC AUTO-ENTMCNC: 32.3 G/DL (ref 31.5–35.7)
MCHC RBC AUTO-ENTMCNC: 32.4 G/DL (ref 31.5–35.7)
MCHC RBC AUTO-ENTMCNC: 32.5 G/DL (ref 31.5–35.7)
MCHC RBC AUTO-ENTMCNC: 32.6 G/DL (ref 31.5–35.7)
MCHC RBC AUTO-ENTMCNC: 32.8 G/DL (ref 31.5–35.7)
MCHC RBC AUTO-ENTMCNC: 33.2 G/DL (ref 31.5–35.7)
MCHC RBC AUTO-ENTMCNC: 33.3 G/DL (ref 31.5–35.7)
MCHC RBC AUTO-ENTMCNC: 33.5 G/DL (ref 31.5–35.7)
MCHC RBC AUTO-ENTMCNC: 33.6 G/DL (ref 31.5–35.7)
MCHC RBC AUTO-ENTMCNC: 33.6 G/DL (ref 31.5–35.7)
MCHC RBC AUTO-ENTMCNC: 33.7 G/DL (ref 31.5–35.7)
MCHC RBC AUTO-ENTMCNC: 33.7 G/DL (ref 31.5–35.7)
MCHC RBC AUTO-ENTMCNC: 34.5 G/DL (ref 31.5–35.7)
MCHC RBC AUTO-ENTMCNC: 34.8 G/DL (ref 31.5–35.7)
MCV RBC AUTO: 100 FL (ref 79–97)
MCV RBC AUTO: 100.4 FL (ref 79–97)
MCV RBC AUTO: 100.7 FL (ref 79–97)
MCV RBC AUTO: 100.8 FL (ref 79–97)
MCV RBC AUTO: 101.2 FL (ref 79–97)
MCV RBC AUTO: 101.4 FL (ref 79–97)
MCV RBC AUTO: 101.5 FL (ref 79–97)
MCV RBC AUTO: 101.6 FL (ref 79–97)
MCV RBC AUTO: 102.1 FL (ref 79–97)
MCV RBC AUTO: 102.5 FL (ref 79–97)
MCV RBC AUTO: 102.8 FL (ref 79–97)
MCV RBC AUTO: 103 FL (ref 79–97)
MCV RBC AUTO: 104.6 FL (ref 79–97)
MCV RBC AUTO: 108.3 FL (ref 79–97)
MCV RBC AUTO: 99.6 FL (ref 79–97)
MODALITY: ABNORMAL
MODALITY: ABNORMAL
MONOCYTES # BLD AUTO: 0.35 10*3/MM3 (ref 0.1–0.9)
MONOCYTES # BLD AUTO: 0.54 10*3/MM3 (ref 0.1–0.9)
MONOCYTES # BLD AUTO: 0.75 10*3/MM3 (ref 0.1–0.9)
MONOCYTES # BLD AUTO: 0.8 10*3/MM3 (ref 0.1–0.9)
MONOCYTES # BLD AUTO: 0.96 10*3/MM3 (ref 0.1–0.9)
MONOCYTES # BLD AUTO: 1.17 10*3/MM3 (ref 0.1–0.9)
MONOCYTES # BLD AUTO: 1.3 10*3/MM3 (ref 0.1–0.9)
MONOCYTES NFR BLD AUTO: 10 % (ref 5–12)
MONOCYTES NFR BLD AUTO: 10.8 % (ref 5–12)
MONOCYTES NFR BLD AUTO: 8.8 % (ref 5–12)
MONOCYTES NFR BLD AUTO: 9.9 % (ref 5–12)
MONOCYTES NFR BLD AUTO: 9.9 % (ref 5–12)
MUCOUS THREADS URNS QL MICRO: ABNORMAL /HPF
MYELOPEROXIDASE AB SER IA-ACNC: <9 U/ML (ref 0–9)
NEGATIVE CONTROL: NEGATIVE
NEUTROPHILS # BLD AUTO: 6.01 10*3/MM3 (ref 1.7–7)
NEUTROPHILS # BLD AUTO: 9.68 10*3/MM3 (ref 1.7–7)
NEUTROPHILS NFR BLD AUTO: 10.57 10*3/MM3 (ref 1.7–7)
NEUTROPHILS NFR BLD AUTO: 5.18 10*3/MM3 (ref 1.7–7)
NEUTROPHILS NFR BLD AUTO: 5.32 10*3/MM3 (ref 1.7–7)
NEUTROPHILS NFR BLD AUTO: 6.29 10*3/MM3 (ref 1.7–7)
NEUTROPHILS NFR BLD AUTO: 66.2 % (ref 42.7–76)
NEUTROPHILS NFR BLD AUTO: 68.4 % (ref 42.7–76)
NEUTROPHILS NFR BLD AUTO: 68.4 % (ref 42.7–76)
NEUTROPHILS NFR BLD AUTO: 70.6 % (ref 42.7–76)
NEUTROPHILS NFR BLD AUTO: 71.3 % (ref 42.7–76)
NEUTROPHILS NFR BLD AUTO: 8.08 10*3/MM3 (ref 1.7–7)
NEUTROPHILS NFR BLD MANUAL: 86.7 % (ref 42.7–76)
NEUTROPHILS NFR BLD MANUAL: 89 % (ref 42.7–76)
NITRITE UR QL STRIP: NEGATIVE
NORMAL PLASMA PT: 13.7 SECONDS (ref 11.7–14.2)
NORMAL PLASMA PTT: 30 SECONDS (ref 22.7–35.4)
NRBC BLD AUTO-RTO: 0 /100 WBC (ref 0–0.2)
NRBC BLD AUTO-RTO: 0.4 /100 WBC (ref 0–0.2)
NRBC BLD AUTO-RTO: 0.5 /100 WBC (ref 0–0.2)
NRBC BLD AUTO-RTO: 1.3 /100 WBC (ref 0–0.2)
NRBC BLD AUTO-RTO: 1.6 /100 WBC (ref 0–0.2)
NRBC BLD AUTO-RTO: 3.6 /100 WBC (ref 0–0.2)
NRBC SPEC MANUAL: 1 /100 WBC (ref 0–0.2)
NRBC SPEC MANUAL: 1 /100 WBC (ref 0–0.2)
NT-PROBNP SERPL-MCNC: 4595 PG/ML (ref 0–1800)
NT-PROBNP SERPL-MCNC: 5967 PG/ML (ref 0–1800)
NT-PROBNP SERPL-MCNC: 9307 PG/ML (ref 0–1800)
P-ANCA ATYPICAL TITR SER IF: NORMAL TITER
P-ANCA TITR SER IF: NORMAL TITER
PCO2 BLDA: 24 MM HG (ref 35–45)
PCO2 BLDA: 36.1 MM HG (ref 35–45)
PH BLDA: 7.27 PH UNITS (ref 7.35–7.45)
PH BLDA: 7.43 PH UNITS (ref 7.35–7.45)
PH UR STRIP.AUTO: <=5 [PH] (ref 5–8)
PHOSPHATE SERPL-MCNC: 5.1 MG/DL (ref 2.5–4.5)
PHOSPHATE SERPL-MCNC: 5.2 MG/DL (ref 2.5–4.5)
PHOSPHATE SERPL-MCNC: 5.2 MG/DL (ref 2.5–4.5)
PHOSPHATE SERPL-MCNC: 6 MG/DL (ref 2.5–4.5)
PHOSPHATE SERPL-MCNC: 8.8 MG/DL (ref 2.5–4.5)
PHOSPHATE SERPL-MCNC: 8.9 MG/DL (ref 2.5–4.5)
PHOSPHATE SERPL-MCNC: 9.1 MG/DL (ref 2.5–4.5)
PLAT MORPH BLD: NORMAL
PLAT MORPH BLD: NORMAL
PLATELET # BLD AUTO: 115 10*3/MM3 (ref 140–450)
PLATELET # BLD AUTO: 136 10*3/MM3 (ref 140–450)
PLATELET # BLD AUTO: 176 10*3/MM3 (ref 140–450)
PLATELET # BLD AUTO: 179 10*3/MM3 (ref 140–450)
PLATELET # BLD AUTO: 179 10*3/MM3 (ref 140–450)
PLATELET # BLD AUTO: 185 10*3/MM3 (ref 140–450)
PLATELET # BLD AUTO: 187 10*3/MM3 (ref 140–450)
PLATELET # BLD AUTO: 187 10*3/MM3 (ref 140–450)
PLATELET # BLD AUTO: 188 10*3/MM3 (ref 140–450)
PLATELET # BLD AUTO: 190 10*3/MM3 (ref 140–450)
PLATELET # BLD AUTO: 191 10*3/MM3 (ref 140–450)
PLATELET # BLD AUTO: 197 10*3/MM3 (ref 140–450)
PLATELET # BLD AUTO: 199 10*3/MM3 (ref 140–450)
PLATELET # BLD AUTO: 204 10*3/MM3 (ref 140–450)
PLATELET # BLD AUTO: 206 10*3/MM3 (ref 140–450)
PLATELET # BLD AUTO: 220 10*3/MM3 (ref 140–450)
PLATELET # BLD AUTO: 246 10*3/MM3 (ref 140–450)
PLATELET # BLD AUTO: 248 10*3/MM3 (ref 140–450)
PLATELETS.RETICULATED NFR BLD AUTO: 6.2 % (ref 0.9–6.5)
PMV BLD AUTO: 10 FL (ref 6–12)
PMV BLD AUTO: 10 FL (ref 6–12)
PMV BLD AUTO: 10.1 FL (ref 6–12)
PMV BLD AUTO: 10.2 FL (ref 6–12)
PMV BLD AUTO: 10.3 FL (ref 6–12)
PMV BLD AUTO: 10.4 FL (ref 6–12)
PMV BLD AUTO: 10.6 FL (ref 6–12)
PMV BLD AUTO: 10.7 FL (ref 6–12)
PMV BLD AUTO: 9.3 FL (ref 6–12)
PMV BLD AUTO: 9.6 FL (ref 6–12)
PMV BLD AUTO: 9.8 FL (ref 6–12)
PO2 BLDA: 112.8 MM HG (ref 80–100)
PO2 BLDA: 96.7 MM HG (ref 80–100)
POSITIVE CONTROL: POSITIVE
POSITIVE LA CONTROL: 34
POTASSIUM SERPL-SCNC: 3.6 MMOL/L (ref 3.5–5.2)
POTASSIUM SERPL-SCNC: 3.8 MMOL/L (ref 3.5–5.2)
POTASSIUM SERPL-SCNC: 4 MMOL/L (ref 3.5–5.2)
POTASSIUM SERPL-SCNC: 4.3 MMOL/L (ref 3.5–5.2)
POTASSIUM SERPL-SCNC: 4.3 MMOL/L (ref 3.5–5.2)
POTASSIUM SERPL-SCNC: 4.6 MMOL/L (ref 3.5–5.2)
POTASSIUM SERPL-SCNC: 4.8 MMOL/L (ref 3.5–5.2)
POTASSIUM SERPL-SCNC: 5.5 MMOL/L (ref 3.5–5.2)
POTASSIUM SERPL-SCNC: 6 MMOL/L (ref 3.5–5.2)
POTASSIUM SERPL-SCNC: 6.7 MMOL/L (ref 3.5–5.2)
POTASSIUM SERPL-SCNC: 6.7 MMOL/L (ref 3.5–5.2)
POTASSIUM SERPL-SCNC: 6.9 MMOL/L (ref 3.5–5.2)
PROCALCITONIN SERPL-MCNC: 0.41 NG/ML (ref 0–0.25)
PROCALCITONIN SERPL-MCNC: 0.53 NG/ML (ref 0–0.25)
PROT PATTERN SERPL IFE-IMP: ABNORMAL
PROT SERPL-MCNC: 5.4 G/DL (ref 6–8.5)
PROT SERPL-MCNC: 5.6 G/DL (ref 6–8.5)
PROT SERPL-MCNC: 6 G/DL (ref 6–8.5)
PROT SERPL-MCNC: 6.1 G/DL (ref 6–8.5)
PROT SERPL-MCNC: 6.3 G/DL (ref 6–8.5)
PROT UR QL STRIP: ABNORMAL
PROT UR QL STRIP: ABNORMAL
PROT UR QL STRIP: NEGATIVE
PROTEINASE3 AB SER IA-ACNC: <3.5 U/ML (ref 0–3.5)
PROTHROMBIN TIME: 14.8 SECONDS (ref 11.7–14.2)
PROTHROMBIN TIME: 16 SECONDS (ref 11.7–14.2)
PROTHROMBIN TIME: >100 SECONDS (ref 11.7–14.2)
PT MIX W PLASMA: 17 SECONDS (ref 11.7–14.2)
PTT MIX W PLASMA: 37 SECONDS (ref 22.7–35.4)
QT INTERVAL: 360 MS
QT INTERVAL: 544 MS
QT INTERVAL: 676 MS
QT INTERVAL: 683 MS
QT INTERVAL: 684 MS
RBC # BLD AUTO: 2.41 10*6/MM3 (ref 4.14–5.8)
RBC # BLD AUTO: 2.48 10*6/MM3 (ref 4.14–5.8)
RBC # BLD AUTO: 2.49 10*6/MM3 (ref 4.14–5.8)
RBC # BLD AUTO: 2.54 10*6/MM3 (ref 4.14–5.8)
RBC # BLD AUTO: 2.57 10*6/MM3 (ref 4.14–5.8)
RBC # BLD AUTO: 2.6 10*6/MM3 (ref 4.14–5.8)
RBC # BLD AUTO: 2.63 10*6/MM3 (ref 4.14–5.8)
RBC # BLD AUTO: 2.82 10*6/MM3 (ref 4.14–5.8)
RBC # BLD AUTO: 2.85 10*6/MM3 (ref 4.14–5.8)
RBC # BLD AUTO: 2.86 10*6/MM3 (ref 4.14–5.8)
RBC # BLD AUTO: 2.87 10*6/MM3 (ref 4.14–5.8)
RBC # BLD AUTO: 2.99 10*6/MM3 (ref 4.14–5.8)
RBC # BLD AUTO: 3.28 10*6/MM3 (ref 4.14–5.8)
RBC # BLD AUTO: 3.36 10*6/MM3 (ref 4.14–5.8)
RBC # BLD AUTO: 3.61 10*6/MM3 (ref 4.14–5.8)
RBC # BLD AUTO: 3.62 10*6/MM3 (ref 4.14–5.8)
RBC # BLD AUTO: 3.94 10*6/MM3 (ref 4.14–5.8)
RBC # UR: ABNORMAL /HPF
RBC # UR: ABNORMAL /HPF
RBC MORPH BLD: NORMAL
REF LAB TEST METHOD: ABNORMAL
REF LAB TEST METHOD: ABNORMAL
RH BLD: NEGATIVE
SAO2 % BLDCOA: 96.5 % (ref 92–99)
SAO2 % BLDCOA: 98.7 % (ref 92–99)
SARS-COV-2 ORF1AB RESP QL NAA+PROBE: NOT DETECTED
SARS-COV-2 ORF1AB RESP QL NAA+PROBE: NOT DETECTED
SINUS: 3.3 CM
SODIUM SERPL-SCNC: 131 MMOL/L (ref 136–145)
SODIUM SERPL-SCNC: 135 MMOL/L (ref 136–145)
SODIUM SERPL-SCNC: 135 MMOL/L (ref 136–145)
SODIUM SERPL-SCNC: 138 MMOL/L (ref 136–145)
SODIUM SERPL-SCNC: 139 MMOL/L (ref 136–145)
SODIUM SERPL-SCNC: 141 MMOL/L (ref 136–145)
SODIUM SERPL-SCNC: 141 MMOL/L (ref 136–145)
SP GR UR STRIP: 1.02 (ref 1–1.03)
SPERM URNS QL MICRO: ABNORMAL /HPF
SPERM URNS QL MICRO: ABNORMAL /HPF
SQUAMOUS #/AREA URNS HPF: ABNORMAL /HPF
SQUAMOUS #/AREA URNS HPF: ABNORMAL /HPF
STJ: 2.8 CM
STRESS TARGET HR: 121 BPM
STRESS TARGET HR: 121 BPM
T&S EXPIRATION DATE: NORMAL
THROMBIN TIME: 16.3 SECONDS (ref 15.7–20.4)
THROMBIN TIME: 16.3 SECONDS (ref 15.7–20.4)
TIBC SERPL-MCNC: 350 MCG/DL (ref 298–536)
TOTAL RATE: 20 BREATHS/MINUTE
TOTAL RATE: 24 BREATHS/MINUTE
TRANSFERRIN SERPL-MCNC: 235 MG/DL (ref 200–360)
TRIGL SERPL-MCNC: 78 MG/DL (ref 0–150)
TROPONIN T SERPL-MCNC: 0.01 NG/ML (ref 0–0.03)
TROPONIN T SERPL-MCNC: 0.03 NG/ML (ref 0–0.03)
TROPONIN T SERPL-MCNC: 0.06 NG/ML (ref 0–0.03)
TROPONIN T SERPL-MCNC: 0.07 NG/ML (ref 0–0.03)
TROPONIN T SERPL-MCNC: 0.07 NG/ML (ref 0–0.03)
TSH SERPL DL<=0.05 MIU/L-ACNC: 4.37 UIU/ML (ref 0.27–4.2)
UNIT  ABO: NORMAL
UNIT  ABO: NORMAL
UNIT  RH: NORMAL
UNIT  RH: NORMAL
URATE SERPL-MCNC: 6.3 MG/DL (ref 3.4–7)
URATE SERPL-MCNC: 6.6 MG/DL (ref 3.4–7)
UROBILINOGEN UR QL STRIP: ABNORMAL
VIT B12 BLD-MCNC: >2000 PG/ML (ref 211–946)
VLDLC SERPL-MCNC: 17 MG/DL (ref 5–40)
WBC # BLD AUTO: 10.22 10*3/MM3 (ref 3.4–10.8)
WBC # BLD AUTO: 10.56 10*3/MM3 (ref 3.4–10.8)
WBC # BLD AUTO: 10.58 10*3/MM3 (ref 3.4–10.8)
WBC # BLD AUTO: 11.17 10*3/MM3 (ref 3.4–10.8)
WBC # BLD AUTO: 11.81 10*3/MM3 (ref 3.4–10.8)
WBC # BLD AUTO: 13.03 10*3/MM3 (ref 3.4–10.8)
WBC # BLD AUTO: 14.81 10*3/MM3 (ref 3.4–10.8)
WBC # BLD AUTO: 6.75 10*3/MM3 (ref 3.4–10.8)
WBC # BLD AUTO: 7.58 10*3/MM3 (ref 3.4–10.8)
WBC # BLD AUTO: 8.03 10*3/MM3 (ref 3.4–10.8)
WBC # BLD AUTO: 8.44 10*3/MM3 (ref 3.4–10.8)
WBC # BLD AUTO: 8.75 10*3/MM3 (ref 3.4–10.8)
WBC # BLD AUTO: 8.9 10*3/MM3 (ref 3.4–10.8)
WBC # BLD AUTO: 9.02 10*3/MM3 (ref 3.4–10.8)
WBC # BLD AUTO: 9.42 10*3/MM3 (ref 3.4–10.8)
WBC # BLD AUTO: 9.73 10*3/MM3 (ref 3.4–10.8)
WBC # BLD AUTO: 9.85 10*3/MM3 (ref 3.4–10.8)
WBC MORPH BLD: NORMAL
WBC MORPH BLD: NORMAL
WBC UR QL AUTO: ABNORMAL /HPF
WBC UR QL AUTO: ABNORMAL /HPF
WHOLE BLOOD HOLD SPECIMEN: NORMAL

## 2021-01-01 PROCEDURE — 80061 LIPID PANEL: CPT | Performed by: INTERNAL MEDICINE

## 2021-01-01 PROCEDURE — 83605 ASSAY OF LACTIC ACID: CPT | Performed by: INTERNAL MEDICINE

## 2021-01-01 PROCEDURE — 0 IOPAMIDOL PER 1 ML: Performed by: INTERNAL MEDICINE

## 2021-01-01 PROCEDURE — 83735 ASSAY OF MAGNESIUM: CPT | Performed by: INTERNAL MEDICINE

## 2021-01-01 PROCEDURE — 94799 UNLISTED PULMONARY SVC/PX: CPT

## 2021-01-01 PROCEDURE — P9016 RBC LEUKOCYTES REDUCED: HCPCS

## 2021-01-01 PROCEDURE — C9132 KCENTRA, PER I.U.: HCPCS | Performed by: NURSE PRACTITIONER

## 2021-01-01 PROCEDURE — 06HY33Z INSERTION OF INFUSION DEVICE INTO LOWER VEIN, PERCUTANEOUS APPROACH: ICD-10-PCS | Performed by: SURGERY

## 2021-01-01 PROCEDURE — 5A1D70Z PERFORMANCE OF URINARY FILTRATION, INTERMITTENT, LESS THAN 6 HOURS PER DAY: ICD-10-PCS | Performed by: INTERNAL MEDICINE

## 2021-01-01 PROCEDURE — 86256 FLUORESCENT ANTIBODY TITER: CPT | Performed by: INTERNAL MEDICINE

## 2021-01-01 PROCEDURE — 93005 ELECTROCARDIOGRAM TRACING: CPT | Performed by: INTERNAL MEDICINE

## 2021-01-01 PROCEDURE — 93227 XTRNL ECG REC<48 HR R&I: CPT | Performed by: INTERNAL MEDICINE

## 2021-01-01 PROCEDURE — 85025 COMPLETE CBC W/AUTO DIFF WBC: CPT | Performed by: INTERNAL MEDICINE

## 2021-01-01 PROCEDURE — 97535 SELF CARE MNGMENT TRAINING: CPT

## 2021-01-01 PROCEDURE — 82962 GLUCOSE BLOOD TEST: CPT

## 2021-01-01 PROCEDURE — 83615 LACTATE (LD) (LDH) ENZYME: CPT | Performed by: INTERNAL MEDICINE

## 2021-01-01 PROCEDURE — 86147 CARDIOLIPIN ANTIBODY EA IG: CPT | Performed by: INTERNAL MEDICINE

## 2021-01-01 PROCEDURE — 85598 HEXAGNAL PHOSPH PLTLT NEUTRL: CPT | Performed by: INTERNAL MEDICINE

## 2021-01-01 PROCEDURE — 80053 COMPREHEN METABOLIC PANEL: CPT

## 2021-01-01 PROCEDURE — 83880 ASSAY OF NATRIURETIC PEPTIDE: CPT | Performed by: NURSE PRACTITIONER

## 2021-01-01 PROCEDURE — 87340 HEPATITIS B SURFACE AG IA: CPT | Performed by: INTERNAL MEDICINE

## 2021-01-01 PROCEDURE — 85730 THROMBOPLASTIN TIME PARTIAL: CPT | Performed by: EMERGENCY MEDICINE

## 2021-01-01 PROCEDURE — 86334 IMMUNOFIX E-PHORESIS SERUM: CPT | Performed by: INTERNAL MEDICINE

## 2021-01-01 PROCEDURE — 80053 COMPREHEN METABOLIC PANEL: CPT | Performed by: INTERNAL MEDICINE

## 2021-01-01 PROCEDURE — 83520 IMMUNOASSAY QUANT NOS NONAB: CPT | Performed by: INTERNAL MEDICINE

## 2021-01-01 PROCEDURE — 0042T HC CT CEREBRAL PERFUSION W/WO CONTRAST: CPT

## 2021-01-01 PROCEDURE — 82784 ASSAY IGA/IGD/IGG/IGM EACH: CPT | Performed by: INTERNAL MEDICINE

## 2021-01-01 PROCEDURE — 70498 CT ANGIOGRAPHY NECK: CPT

## 2021-01-01 PROCEDURE — 86160 COMPLEMENT ANTIGEN: CPT | Performed by: INTERNAL MEDICINE

## 2021-01-01 PROCEDURE — 86900 BLOOD TYPING SEROLOGIC ABO: CPT

## 2021-01-01 PROCEDURE — 83540 ASSAY OF IRON: CPT | Performed by: INTERNAL MEDICINE

## 2021-01-01 PROCEDURE — 85027 COMPLETE CBC AUTOMATED: CPT | Performed by: INTERNAL MEDICINE

## 2021-01-01 PROCEDURE — 85610 PROTHROMBIN TIME: CPT | Performed by: INTERNAL MEDICINE

## 2021-01-01 PROCEDURE — 92611 MOTION FLUOROSCOPY/SWALLOW: CPT

## 2021-01-01 PROCEDURE — 25010000002 METHYLPREDNISOLONE PER 125 MG: Performed by: INTERNAL MEDICINE

## 2021-01-01 PROCEDURE — 94640 AIRWAY INHALATION TREATMENT: CPT

## 2021-01-01 PROCEDURE — 87220 TISSUE EXAM FOR FUNGI: CPT | Performed by: INTERNAL MEDICINE

## 2021-01-01 PROCEDURE — 97165 OT EVAL LOW COMPLEX 30 MIN: CPT

## 2021-01-01 PROCEDURE — 84484 ASSAY OF TROPONIN QUANT: CPT | Performed by: INTERNAL MEDICINE

## 2021-01-01 PROCEDURE — 74230 X-RAY XM SWLNG FUNCJ C+: CPT

## 2021-01-01 PROCEDURE — 80069 RENAL FUNCTION PANEL: CPT | Performed by: INTERNAL MEDICINE

## 2021-01-01 PROCEDURE — 99283 EMERGENCY DEPT VISIT LOW MDM: CPT

## 2021-01-01 PROCEDURE — 93306 TTE W/DOPPLER COMPLETE: CPT | Performed by: INTERNAL MEDICINE

## 2021-01-01 PROCEDURE — 25010000002 CALCIUM GLUCONATE-NACL 1-0.675 GM/50ML-% SOLUTION: Performed by: INTERNAL MEDICINE

## 2021-01-01 PROCEDURE — 25010000002 FUROSEMIDE PER 20 MG: Performed by: INTERNAL MEDICINE

## 2021-01-01 PROCEDURE — U0004 COV-19 TEST NON-CDC HGH THRU: HCPCS | Performed by: NURSE PRACTITIONER

## 2021-01-01 PROCEDURE — 93005 ELECTROCARDIOGRAM TRACING: CPT

## 2021-01-01 PROCEDURE — 93306 TTE W/DOPPLER COMPLETE: CPT

## 2021-01-01 PROCEDURE — 93010 ELECTROCARDIOGRAM REPORT: CPT | Performed by: INTERNAL MEDICINE

## 2021-01-01 PROCEDURE — 85025 COMPLETE CBC W/AUTO DIFF WBC: CPT

## 2021-01-01 PROCEDURE — U0004 COV-19 TEST NON-CDC HGH THRU: HCPCS | Performed by: EMERGENCY MEDICINE

## 2021-01-01 PROCEDURE — 85730 THROMBOPLASTIN TIME PARTIAL: CPT | Performed by: INTERNAL MEDICINE

## 2021-01-01 PROCEDURE — 85007 BL SMEAR W/DIFF WBC COUNT: CPT | Performed by: INTERNAL MEDICINE

## 2021-01-01 PROCEDURE — 84466 ASSAY OF TRANSFERRIN: CPT | Performed by: INTERNAL MEDICINE

## 2021-01-01 PROCEDURE — 81003 URINALYSIS AUTO W/O SCOPE: CPT

## 2021-01-01 PROCEDURE — 71045 X-RAY EXAM CHEST 1 VIEW: CPT

## 2021-01-01 PROCEDURE — 25010000002 HYDROCORTISONE SODIUM SUCCINATE 100 MG RECONSTITUTED SOLUTION: Performed by: INTERNAL MEDICINE

## 2021-01-01 PROCEDURE — 82270 OCCULT BLOOD FECES: CPT | Performed by: NURSE PRACTITIONER

## 2021-01-01 PROCEDURE — 83880 ASSAY OF NATRIURETIC PEPTIDE: CPT | Performed by: INTERNAL MEDICINE

## 2021-01-01 PROCEDURE — 74176 CT ABD & PELVIS W/O CONTRAST: CPT

## 2021-01-01 PROCEDURE — 85732 THROMBOPLASTIN TIME PARTIAL: CPT | Performed by: INTERNAL MEDICINE

## 2021-01-01 PROCEDURE — 86900 BLOOD TYPING SEROLOGIC ABO: CPT | Performed by: NURSE PRACTITIONER

## 2021-01-01 PROCEDURE — 85610 PROTHROMBIN TIME: CPT | Performed by: EMERGENCY MEDICINE

## 2021-01-01 PROCEDURE — 85384 FIBRINOGEN ACTIVITY: CPT | Performed by: INTERNAL MEDICINE

## 2021-01-01 PROCEDURE — 25010000002 HEPARIN (PORCINE) PER 1000 UNITS: Performed by: SURGERY

## 2021-01-01 PROCEDURE — 99232 SBSQ HOSP IP/OBS MODERATE 35: CPT | Performed by: INTERNAL MEDICINE

## 2021-01-01 PROCEDURE — 84484 ASSAY OF TROPONIN QUANT: CPT

## 2021-01-01 PROCEDURE — 84550 ASSAY OF BLOOD/URIC ACID: CPT | Performed by: INTERNAL MEDICINE

## 2021-01-01 PROCEDURE — 99222 1ST HOSP IP/OBS MODERATE 55: CPT | Performed by: INTERNAL MEDICINE

## 2021-01-01 PROCEDURE — 63710000001 INSULIN REGULAR HUMAN PER 5 UNITS: Performed by: INTERNAL MEDICINE

## 2021-01-01 PROCEDURE — 36430 TRANSFUSION BLD/BLD COMPNT: CPT

## 2021-01-01 PROCEDURE — 83036 HEMOGLOBIN GLYCOSYLATED A1C: CPT | Performed by: INTERNAL MEDICINE

## 2021-01-01 PROCEDURE — 25010000002 PROPOFOL 10 MG/ML EMULSION: Performed by: ANESTHESIOLOGY

## 2021-01-01 PROCEDURE — 85055 RETICULATED PLATELET ASSAY: CPT | Performed by: INTERNAL MEDICINE

## 2021-01-01 PROCEDURE — 86923 COMPATIBILITY TEST ELECTRIC: CPT

## 2021-01-01 PROCEDURE — 0DB58ZX EXCISION OF ESOPHAGUS, VIA NATURAL OR ARTIFICIAL OPENING ENDOSCOPIC, DIAGNOSTIC: ICD-10-PCS | Performed by: INTERNAL MEDICINE

## 2021-01-01 PROCEDURE — 85025 COMPLETE CBC W/AUTO DIFF WBC: CPT | Performed by: EMERGENCY MEDICINE

## 2021-01-01 PROCEDURE — 93226 XTRNL ECG REC<48 HR SCAN A/R: CPT

## 2021-01-01 PROCEDURE — 82803 BLOOD GASES ANY COMBINATION: CPT

## 2021-01-01 PROCEDURE — 87040 BLOOD CULTURE FOR BACTERIA: CPT | Performed by: INTERNAL MEDICINE

## 2021-01-01 PROCEDURE — 80048 BASIC METABOLIC PNL TOTAL CA: CPT | Performed by: INTERNAL MEDICINE

## 2021-01-01 PROCEDURE — 25010000002 PROTHROMBIN COMPLEX CONC HUMAN 1000 UNITS KIT: Performed by: NURSE PRACTITIONER

## 2021-01-01 PROCEDURE — 93225 XTRNL ECG REC<48 HRS REC: CPT

## 2021-01-01 PROCEDURE — 85670 THROMBIN TIME PLASMA: CPT | Performed by: INTERNAL MEDICINE

## 2021-01-01 PROCEDURE — 85613 RUSSELL VIPER VENOM DILUTED: CPT | Performed by: INTERNAL MEDICINE

## 2021-01-01 PROCEDURE — 82550 ASSAY OF CK (CPK): CPT | Performed by: INTERNAL MEDICINE

## 2021-01-01 PROCEDURE — 36600 WITHDRAWAL OF ARTERIAL BLOOD: CPT

## 2021-01-01 PROCEDURE — 97110 THERAPEUTIC EXERCISES: CPT

## 2021-01-01 PROCEDURE — 83735 ASSAY OF MAGNESIUM: CPT

## 2021-01-01 PROCEDURE — 82607 VITAMIN B-12: CPT | Performed by: INTERNAL MEDICINE

## 2021-01-01 PROCEDURE — 82140 ASSAY OF AMMONIA: CPT | Performed by: INTERNAL MEDICINE

## 2021-01-01 PROCEDURE — 82595 ASSAY OF CRYOGLOBULIN: CPT | Performed by: INTERNAL MEDICINE

## 2021-01-01 PROCEDURE — 25010000002 LORAZEPAM PER 2 MG

## 2021-01-01 PROCEDURE — 84145 PROCALCITONIN (PCT): CPT | Performed by: INTERNAL MEDICINE

## 2021-01-01 PROCEDURE — 25010000002 PERFLUTREN (DEFINITY) 8.476 MG IN SODIUM CHLORIDE (PF) 0.9 % 10 ML INJECTION: Performed by: INTERNAL MEDICINE

## 2021-01-01 PROCEDURE — 93005 ELECTROCARDIOGRAM TRACING: CPT | Performed by: EMERGENCY MEDICINE

## 2021-01-01 PROCEDURE — 81001 URINALYSIS AUTO W/SCOPE: CPT | Performed by: INTERNAL MEDICINE

## 2021-01-01 PROCEDURE — 99232 SBSQ HOSP IP/OBS MODERATE 35: CPT | Performed by: NURSE PRACTITIONER

## 2021-01-01 PROCEDURE — U0005 INFEC AGEN DETEC AMPLI PROBE: HCPCS | Performed by: EMERGENCY MEDICINE

## 2021-01-01 PROCEDURE — 97162 PT EVAL MOD COMPLEX 30 MIN: CPT

## 2021-01-01 PROCEDURE — 92610 EVALUATE SWALLOWING FUNCTION: CPT

## 2021-01-01 PROCEDURE — 84100 ASSAY OF PHOSPHORUS: CPT | Performed by: INTERNAL MEDICINE

## 2021-01-01 PROCEDURE — 70496 CT ANGIOGRAPHY HEAD: CPT

## 2021-01-01 PROCEDURE — 86901 BLOOD TYPING SEROLOGIC RH(D): CPT | Performed by: NURSE PRACTITIONER

## 2021-01-01 PROCEDURE — 85610 PROTHROMBIN TIME: CPT | Performed by: NURSE PRACTITIONER

## 2021-01-01 PROCEDURE — 76775 US EXAM ABDO BACK WALL LIM: CPT

## 2021-01-01 PROCEDURE — 5A1D90Z PERFORMANCE OF URINARY FILTRATION, CONTINUOUS, GREATER THAN 18 HOURS PER DAY: ICD-10-PCS | Performed by: INTERNAL MEDICINE

## 2021-01-01 PROCEDURE — 99233 SBSQ HOSP IP/OBS HIGH 50: CPT | Performed by: INTERNAL MEDICINE

## 2021-01-01 PROCEDURE — 82533 TOTAL CORTISOL: CPT | Performed by: INTERNAL MEDICINE

## 2021-01-01 PROCEDURE — 81001 URINALYSIS AUTO W/SCOPE: CPT | Performed by: EMERGENCY MEDICINE

## 2021-01-01 PROCEDURE — 25010000002 MAGNESIUM SULFATE 2 GM/50ML SOLUTION: Performed by: INTERNAL MEDICINE

## 2021-01-01 PROCEDURE — 43235 EGD DIAGNOSTIC BRUSH WASH: CPT | Performed by: INTERNAL MEDICINE

## 2021-01-01 PROCEDURE — 25010000003 PHYTONADIONE 10 MG/ML SOLUTION 1 ML AMPULE: Performed by: INTERNAL MEDICINE

## 2021-01-01 PROCEDURE — 51798 US URINE CAPACITY MEASURE: CPT

## 2021-01-01 PROCEDURE — 99284 EMERGENCY DEPT VISIT MOD MDM: CPT

## 2021-01-01 PROCEDURE — 86146 BETA-2 GLYCOPROTEIN ANTIBODY: CPT | Performed by: INTERNAL MEDICINE

## 2021-01-01 PROCEDURE — 99222 1ST HOSP IP/OBS MODERATE 55: CPT | Performed by: PSYCHIATRY & NEUROLOGY

## 2021-01-01 PROCEDURE — 84443 ASSAY THYROID STIM HORMONE: CPT | Performed by: INTERNAL MEDICINE

## 2021-01-01 PROCEDURE — 25010000002 CEFTRIAXONE PER 250 MG: Performed by: INTERNAL MEDICINE

## 2021-01-01 PROCEDURE — 85611 PROTHROMBIN TEST: CPT | Performed by: INTERNAL MEDICINE

## 2021-01-01 PROCEDURE — 85362 FIBRIN DEGRADATION PRODUCTS: CPT | Performed by: INTERNAL MEDICINE

## 2021-01-01 PROCEDURE — 82746 ASSAY OF FOLIC ACID SERUM: CPT | Performed by: INTERNAL MEDICINE

## 2021-01-01 PROCEDURE — 86225 DNA ANTIBODY NATIVE: CPT | Performed by: INTERNAL MEDICINE

## 2021-01-01 PROCEDURE — 25010000002 HYDROMORPHONE 1 MG/ML SOLUTION: Performed by: INTERNAL MEDICINE

## 2021-01-01 PROCEDURE — 86850 RBC ANTIBODY SCREEN: CPT | Performed by: NURSE PRACTITIONER

## 2021-01-01 PROCEDURE — 99285 EMERGENCY DEPT VISIT HI MDM: CPT

## 2021-01-01 PROCEDURE — 85730 THROMBOPLASTIN TIME PARTIAL: CPT | Performed by: NURSE PRACTITIONER

## 2021-01-01 PROCEDURE — 80074 ACUTE HEPATITIS PANEL: CPT | Performed by: INTERNAL MEDICINE

## 2021-01-01 PROCEDURE — 25010000003 PHYTONADIONE 10 MG/ML SOLUTION 1 ML AMPULE: Performed by: NURSE PRACTITIONER

## 2021-01-01 PROCEDURE — 82728 ASSAY OF FERRITIN: CPT | Performed by: INTERNAL MEDICINE

## 2021-01-01 PROCEDURE — 83010 ASSAY OF HAPTOGLOBIN QUANT: CPT | Performed by: INTERNAL MEDICINE

## 2021-01-01 PROCEDURE — 82330 ASSAY OF CALCIUM: CPT | Performed by: INTERNAL MEDICINE

## 2021-01-01 RX ORDER — NITROGLYCERIN 0.4 MG/1
0.4 TABLET SUBLINGUAL
Status: DISCONTINUED | OUTPATIENT
Start: 2021-01-01 | End: 2021-01-01

## 2021-01-01 RX ORDER — ACETAMINOPHEN 650 MG/1
650 SUPPOSITORY RECTAL EVERY 4 HOURS PRN
Status: DISCONTINUED | OUTPATIENT
Start: 2021-01-01 | End: 2021-01-01

## 2021-01-01 RX ORDER — AMANTADINE HYDROCHLORIDE 100 MG/1
100 CAPSULE, GELATIN COATED ORAL EVERY 12 HOURS SCHEDULED
Status: DISCONTINUED | OUTPATIENT
Start: 2021-01-01 | End: 2021-01-01 | Stop reason: HOSPADM

## 2021-01-01 RX ORDER — SODIUM BICARBONATE IN D5W 150/1000ML
150 PLASTIC BAG, INJECTION (ML) INTRAVENOUS CONTINUOUS
Status: DISCONTINUED | OUTPATIENT
Start: 2021-01-01 | End: 2021-01-01

## 2021-01-01 RX ORDER — NOREPINEPHRINE BIT/0.9 % NACL 8 MG/250ML
.02-.3 INFUSION BOTTLE (ML) INTRAVENOUS
Status: DISCONTINUED | OUTPATIENT
Start: 2021-01-01 | End: 2021-01-01

## 2021-01-01 RX ORDER — FUROSEMIDE 20 MG/1
20 TABLET ORAL DAILY
Qty: 90 TABLET | Refills: 3 | Status: SHIPPED | OUTPATIENT
Start: 2021-01-01

## 2021-01-01 RX ORDER — ALPRAZOLAM 0.25 MG/1
0.25 TABLET ORAL NIGHTLY PRN
Status: DISCONTINUED | OUTPATIENT
Start: 2021-01-01 | End: 2021-01-01 | Stop reason: HOSPADM

## 2021-01-01 RX ORDER — ONDANSETRON 2 MG/ML
4 INJECTION INTRAMUSCULAR; INTRAVENOUS EVERY 6 HOURS PRN
Status: DISCONTINUED | OUTPATIENT
Start: 2021-01-01 | End: 2021-01-01 | Stop reason: HOSPADM

## 2021-01-01 RX ORDER — ALBUMIN (HUMAN) 12.5 G/50ML
12.5 SOLUTION INTRAVENOUS AS NEEDED
Status: DISCONTINUED | OUTPATIENT
Start: 2021-01-01 | End: 2021-01-01

## 2021-01-01 RX ORDER — PROPOFOL 10 MG/ML
VIAL (ML) INTRAVENOUS AS NEEDED
Status: DISCONTINUED | OUTPATIENT
Start: 2021-01-01 | End: 2021-01-01 | Stop reason: SURG

## 2021-01-01 RX ORDER — ACETAMINOPHEN 650 MG/1
650 SUPPOSITORY RECTAL EVERY 4 HOURS PRN
Status: DISCONTINUED | OUTPATIENT
Start: 2021-01-01 | End: 2021-01-01 | Stop reason: HOSPADM

## 2021-01-01 RX ORDER — LORAZEPAM 2 MG/ML
2 INJECTION INTRAMUSCULAR
Status: DISCONTINUED | OUTPATIENT
Start: 2021-01-01 | End: 2021-01-01 | Stop reason: HOSPADM

## 2021-01-01 RX ORDER — SODIUM CHLORIDE 9 MG/ML
150 INJECTION, SOLUTION INTRAVENOUS CONTINUOUS
Status: DISCONTINUED | OUTPATIENT
Start: 2021-01-01 | End: 2021-01-01

## 2021-01-01 RX ORDER — LORAZEPAM 2 MG/ML
2 INJECTION INTRAMUSCULAR EVERY 4 HOURS PRN
Status: COMPLETED | OUTPATIENT
Start: 2021-01-01 | End: 2021-01-01

## 2021-01-01 RX ORDER — DIPHENOXYLATE HYDROCHLORIDE AND ATROPINE SULFATE 2.5; .025 MG/1; MG/1
1 TABLET ORAL
Status: DISCONTINUED | OUTPATIENT
Start: 2021-01-01 | End: 2021-01-01 | Stop reason: HOSPADM

## 2021-01-01 RX ORDER — DEXTROSE MONOHYDRATE 25 G/50ML
50 INJECTION, SOLUTION INTRAVENOUS
Status: DISCONTINUED | OUTPATIENT
Start: 2021-01-01 | End: 2021-01-01

## 2021-01-01 RX ORDER — SODIUM CHLORIDE 0.9 % (FLUSH) 0.9 %
10 SYRINGE (ML) INJECTION AS NEEDED
Status: DISCONTINUED | OUTPATIENT
Start: 2021-01-01 | End: 2021-01-01 | Stop reason: HOSPADM

## 2021-01-01 RX ORDER — HEPARIN SODIUM 1000 [USP'U]/ML
INJECTION, SOLUTION INTRAVENOUS; SUBCUTANEOUS AS NEEDED
Status: DISCONTINUED | OUTPATIENT
Start: 2021-01-01 | End: 2021-01-01

## 2021-01-01 RX ORDER — LORAZEPAM 2 MG/ML
1 CONCENTRATE ORAL
Status: DISCONTINUED | OUTPATIENT
Start: 2021-01-01 | End: 2021-01-01 | Stop reason: HOSPADM

## 2021-01-01 RX ORDER — LORAZEPAM 2 MG/ML
2 CONCENTRATE ORAL
Status: DISCONTINUED | OUTPATIENT
Start: 2021-01-01 | End: 2021-01-01 | Stop reason: HOSPADM

## 2021-01-01 RX ORDER — MELATONIN
1000 DAILY
Status: DISCONTINUED | OUTPATIENT
Start: 2021-01-01 | End: 2021-01-01

## 2021-01-01 RX ORDER — AMANTADINE HYDROCHLORIDE 100 MG/1
100 CAPSULE, GELATIN COATED ORAL EVERY 12 HOURS SCHEDULED
Status: DISCONTINUED | OUTPATIENT
Start: 2021-01-01 | End: 2021-01-01

## 2021-01-01 RX ORDER — PIOGLITAZONEHYDROCHLORIDE 15 MG/1
15 TABLET ORAL DAILY
Status: DISCONTINUED | OUTPATIENT
Start: 2021-01-01 | End: 2021-01-01 | Stop reason: HOSPADM

## 2021-01-01 RX ORDER — DEXTROSE MONOHYDRATE 25 G/50ML
INJECTION, SOLUTION INTRAVENOUS
Status: COMPLETED
Start: 2021-01-01 | End: 2021-01-01

## 2021-01-01 RX ORDER — LORAZEPAM 2 MG/ML
0.5 CONCENTRATE ORAL
Status: DISCONTINUED | OUTPATIENT
Start: 2021-01-01 | End: 2021-01-01 | Stop reason: HOSPADM

## 2021-01-01 RX ORDER — GLYCOPYRROLATE 0.2 MG/ML
0.1 INJECTION INTRAMUSCULAR; INTRAVENOUS EVERY 4 HOURS PRN
Status: DISCONTINUED | OUTPATIENT
Start: 2021-01-01 | End: 2021-01-01 | Stop reason: HOSPADM

## 2021-01-01 RX ORDER — SODIUM CHLORIDE 0.9 % (FLUSH) 0.9 %
10 SYRINGE (ML) INJECTION AS NEEDED
Status: DISCONTINUED | OUTPATIENT
Start: 2021-01-01 | End: 2021-01-01

## 2021-01-01 RX ORDER — LIDOCAINE HYDROCHLORIDE 20 MG/ML
INJECTION, SOLUTION INFILTRATION; PERINEURAL AS NEEDED
Status: DISCONTINUED | OUTPATIENT
Start: 2021-01-01 | End: 2021-01-01 | Stop reason: SURG

## 2021-01-01 RX ORDER — FUROSEMIDE 40 MG/1
40 TABLET ORAL 2 TIMES DAILY
COMMUNITY
End: 2021-01-01 | Stop reason: HOSPADM

## 2021-01-01 RX ORDER — SODIUM CHLORIDE 0.9 % (FLUSH) 0.9 %
3 SYRINGE (ML) INJECTION EVERY 12 HOURS SCHEDULED
Status: DISCONTINUED | OUTPATIENT
Start: 2021-01-01 | End: 2021-01-01

## 2021-01-01 RX ORDER — PIOGLITAZONEHYDROCHLORIDE 15 MG/1
15 TABLET ORAL DAILY
Status: DISCONTINUED | OUTPATIENT
Start: 2021-01-01 | End: 2021-01-01

## 2021-01-01 RX ORDER — DOCUSATE SODIUM 100 MG/1
100 CAPSULE, LIQUID FILLED ORAL 2 TIMES DAILY
Status: DISCONTINUED | OUTPATIENT
Start: 2021-01-01 | End: 2021-01-01

## 2021-01-01 RX ORDER — CHOLECALCIFEROL (VITAMIN D3) 125 MCG
5 CAPSULE ORAL NIGHTLY PRN
Status: DISCONTINUED | OUTPATIENT
Start: 2021-01-01 | End: 2021-01-01

## 2021-01-01 RX ORDER — FERROUS SULFATE 325(65) MG
325 TABLET ORAL
Status: DISCONTINUED | OUTPATIENT
Start: 2021-01-01 | End: 2021-01-01 | Stop reason: HOSPADM

## 2021-01-01 RX ORDER — MONTELUKAST SODIUM 10 MG/1
10 TABLET ORAL NIGHTLY
Status: DISCONTINUED | OUTPATIENT
Start: 2021-01-01 | End: 2021-01-01

## 2021-01-01 RX ORDER — NICOTINE POLACRILEX 4 MG
15 LOZENGE BUCCAL
Status: DISCONTINUED | OUTPATIENT
Start: 2021-01-01 | End: 2021-01-01 | Stop reason: HOSPADM

## 2021-01-01 RX ORDER — LORAZEPAM 2 MG/ML
1 INJECTION INTRAMUSCULAR
Status: DISCONTINUED | OUTPATIENT
Start: 2021-01-01 | End: 2021-01-01 | Stop reason: HOSPADM

## 2021-01-01 RX ORDER — LORAZEPAM 2 MG/ML
0.5 INJECTION INTRAMUSCULAR
Status: DISCONTINUED | OUTPATIENT
Start: 2021-01-01 | End: 2021-01-01 | Stop reason: HOSPADM

## 2021-01-01 RX ORDER — ALLOPURINOL 100 MG/1
100 TABLET ORAL DAILY
Status: DISCONTINUED | OUTPATIENT
Start: 2021-01-01 | End: 2021-01-01 | Stop reason: HOSPADM

## 2021-01-01 RX ORDER — ACETAMINOPHEN 325 MG/1
650 TABLET ORAL EVERY 4 HOURS PRN
Status: DISCONTINUED | OUTPATIENT
Start: 2021-01-01 | End: 2021-01-01 | Stop reason: HOSPADM

## 2021-01-01 RX ORDER — FUROSEMIDE 20 MG/1
20 TABLET ORAL DAILY
Status: DISCONTINUED | OUTPATIENT
Start: 2021-01-01 | End: 2021-01-01

## 2021-01-01 RX ORDER — HYDROMORPHONE HYDROCHLORIDE 1 MG/ML
0.5 INJECTION, SOLUTION INTRAMUSCULAR; INTRAVENOUS; SUBCUTANEOUS
Status: DISCONTINUED | OUTPATIENT
Start: 2021-01-01 | End: 2021-01-01 | Stop reason: HOSPADM

## 2021-01-01 RX ORDER — KETOROLAC TROMETHAMINE 30 MG/ML
15 INJECTION, SOLUTION INTRAMUSCULAR; INTRAVENOUS EVERY 6 HOURS PRN
Status: DISCONTINUED | OUTPATIENT
Start: 2021-01-01 | End: 2021-01-01 | Stop reason: HOSPADM

## 2021-01-01 RX ORDER — AMANTADINE HYDROCHLORIDE 100 MG/1
100 CAPSULE, GELATIN COATED ORAL 2 TIMES DAILY
COMMUNITY

## 2021-01-01 RX ORDER — MORPHINE SULFATE 2 MG/ML
2 INJECTION, SOLUTION INTRAMUSCULAR; INTRAVENOUS
Status: DISCONTINUED | OUTPATIENT
Start: 2021-01-01 | End: 2021-01-01 | Stop reason: HOSPADM

## 2021-01-01 RX ORDER — METHYLPREDNISOLONE SODIUM SUCCINATE 40 MG/ML
40 INJECTION, POWDER, LYOPHILIZED, FOR SOLUTION INTRAMUSCULAR; INTRAVENOUS EVERY 8 HOURS
Status: DISCONTINUED | OUTPATIENT
Start: 2021-01-01 | End: 2021-01-01

## 2021-01-01 RX ORDER — CALCIUM CARBONATE 200(500)MG
2 TABLET,CHEWABLE ORAL 3 TIMES DAILY PRN
Status: DISCONTINUED | OUTPATIENT
Start: 2021-01-01 | End: 2021-01-01

## 2021-01-01 RX ORDER — SODIUM CHLORIDE, SODIUM LACTATE, POTASSIUM CHLORIDE, CALCIUM CHLORIDE 600; 310; 30; 20 MG/100ML; MG/100ML; MG/100ML; MG/100ML
50 INJECTION, SOLUTION INTRAVENOUS CONTINUOUS
Status: DISCONTINUED | OUTPATIENT
Start: 2021-01-01 | End: 2021-01-01

## 2021-01-01 RX ORDER — ONDANSETRON 2 MG/ML
4 INJECTION INTRAMUSCULAR; INTRAVENOUS EVERY 6 HOURS PRN
Status: DISCONTINUED | OUTPATIENT
Start: 2021-01-01 | End: 2021-01-01

## 2021-01-01 RX ORDER — GUAIFENESIN AND CODEINE PHOSPHATE 100; 10 MG/5ML; MG/5ML
5 SOLUTION ORAL EVERY 4 HOURS PRN
Status: DISCONTINUED | OUTPATIENT
Start: 2021-01-01 | End: 2021-01-01 | Stop reason: HOSPADM

## 2021-01-01 RX ORDER — FAMOTIDINE 10 MG/ML
20 INJECTION, SOLUTION INTRAVENOUS DAILY
Status: DISCONTINUED | OUTPATIENT
Start: 2021-01-01 | End: 2021-01-01

## 2021-01-01 RX ORDER — METOPROLOL TARTRATE 50 MG/1
50 TABLET, FILM COATED ORAL EVERY 12 HOURS SCHEDULED
Status: DISCONTINUED | OUTPATIENT
Start: 2021-01-01 | End: 2021-01-01

## 2021-01-01 RX ORDER — ACETAMINOPHEN 160 MG/5ML
650 SOLUTION ORAL EVERY 4 HOURS PRN
Status: DISCONTINUED | OUTPATIENT
Start: 2021-01-01 | End: 2021-01-01

## 2021-01-01 RX ORDER — METOPROLOL TARTRATE 50 MG/1
50 TABLET, FILM COATED ORAL EVERY 12 HOURS SCHEDULED
Qty: 180 TABLET | Refills: 3 | Status: SHIPPED | OUTPATIENT
Start: 2021-01-01 | End: 2021-01-01

## 2021-01-01 RX ORDER — MAGNESIUM SULFATE HEPTAHYDRATE 40 MG/ML
2 INJECTION, SOLUTION INTRAVENOUS AS NEEDED
Status: DISCONTINUED | OUTPATIENT
Start: 2021-01-01 | End: 2021-01-01

## 2021-01-01 RX ORDER — CEFTRIAXONE SODIUM 1 G/50ML
1 INJECTION, SOLUTION INTRAVENOUS EVERY 24 HOURS
Status: DISCONTINUED | OUTPATIENT
Start: 2021-01-01 | End: 2021-01-01

## 2021-01-01 RX ORDER — MORPHINE SULFATE 20 MG/ML
5 SOLUTION ORAL
Status: DISCONTINUED | OUTPATIENT
Start: 2021-01-01 | End: 2021-01-01 | Stop reason: HOSPADM

## 2021-01-01 RX ORDER — FUROSEMIDE 10 MG/ML
80 INJECTION INTRAMUSCULAR; INTRAVENOUS ONCE
Status: COMPLETED | OUTPATIENT
Start: 2021-01-01 | End: 2021-01-01

## 2021-01-01 RX ORDER — WARFARIN SODIUM 5 MG/1
5 TABLET ORAL
Status: ON HOLD | COMMUNITY
End: 2021-01-01 | Stop reason: ALTCHOICE

## 2021-01-01 RX ORDER — SODIUM CHLORIDE 9 MG/ML
75 INJECTION, SOLUTION INTRAVENOUS CONTINUOUS
Status: DISCONTINUED | OUTPATIENT
Start: 2021-01-01 | End: 2021-01-01

## 2021-01-01 RX ORDER — CALCIUM GLUCONATE 20 MG/ML
2 INJECTION, SOLUTION INTRAVENOUS ONCE
Status: COMPLETED | OUTPATIENT
Start: 2021-01-01 | End: 2021-01-01

## 2021-01-01 RX ORDER — CALCIUM GLUCONATE 20 MG/ML
1 INJECTION, SOLUTION INTRAVENOUS ONCE
Status: COMPLETED | OUTPATIENT
Start: 2021-01-01 | End: 2021-01-01

## 2021-01-01 RX ORDER — POTASSIUM CHLORIDE 29.8 MG/ML
20 INJECTION INTRAVENOUS AS NEEDED
Status: DISCONTINUED | OUTPATIENT
Start: 2021-01-01 | End: 2021-01-01

## 2021-01-01 RX ORDER — MONTELUKAST SODIUM 10 MG/1
10 TABLET ORAL NIGHTLY
Status: DISCONTINUED | OUTPATIENT
Start: 2021-01-01 | End: 2021-01-01 | Stop reason: HOSPADM

## 2021-01-01 RX ORDER — METOPROLOL TARTRATE 50 MG/1
75 TABLET, FILM COATED ORAL EVERY 12 HOURS SCHEDULED
Qty: 270 TABLET | Refills: 3 | Status: SHIPPED | OUTPATIENT
Start: 2021-01-01

## 2021-01-01 RX ORDER — ONDANSETRON 4 MG/1
4 TABLET, FILM COATED ORAL EVERY 6 HOURS PRN
Status: DISCONTINUED | OUTPATIENT
Start: 2021-01-01 | End: 2021-01-01 | Stop reason: HOSPADM

## 2021-01-01 RX ORDER — FERROUS SULFATE 325(65) MG
325 TABLET ORAL
Status: DISCONTINUED | OUTPATIENT
Start: 2021-01-01 | End: 2021-01-01

## 2021-01-01 RX ORDER — HEPARIN SODIUM 1000 [USP'U]/ML
3000 INJECTION, SOLUTION INTRAVENOUS; SUBCUTANEOUS ONCE
Status: COMPLETED | OUTPATIENT
Start: 2021-01-01 | End: 2021-01-01

## 2021-01-01 RX ORDER — ACETAMINOPHEN 325 MG/1
650 TABLET ORAL EVERY 4 HOURS PRN
Status: DISCONTINUED | OUTPATIENT
Start: 2021-01-01 | End: 2021-01-01

## 2021-01-01 RX ORDER — PHYTONADIONE 5 MG/1
5 TABLET ORAL ONCE
Status: COMPLETED | OUTPATIENT
Start: 2021-01-01 | End: 2021-01-01

## 2021-01-01 RX ORDER — CETIRIZINE HYDROCHLORIDE 10 MG/1
10 TABLET ORAL DAILY
Status: DISCONTINUED | OUTPATIENT
Start: 2021-01-01 | End: 2021-01-01 | Stop reason: HOSPADM

## 2021-01-01 RX ORDER — PANTOPRAZOLE SODIUM 40 MG/10ML
80 INJECTION, POWDER, LYOPHILIZED, FOR SOLUTION INTRAVENOUS ONCE
Status: COMPLETED | OUTPATIENT
Start: 2021-01-01 | End: 2021-01-01

## 2021-01-01 RX ORDER — PRAVASTATIN SODIUM 40 MG
40 TABLET ORAL NIGHTLY
Status: DISCONTINUED | OUTPATIENT
Start: 2021-01-01 | End: 2021-01-01 | Stop reason: HOSPADM

## 2021-01-01 RX ORDER — LORAZEPAM 2 MG/ML
INJECTION INTRAMUSCULAR
Status: COMPLETED
Start: 2021-01-01 | End: 2021-01-01

## 2021-01-01 RX ORDER — ALLOPURINOL 100 MG/1
100 TABLET ORAL DAILY
Status: DISCONTINUED | OUTPATIENT
Start: 2021-01-01 | End: 2021-01-01

## 2021-01-01 RX ORDER — LANOLIN ALCOHOL/MO/W.PET/CERES
325 CREAM (GRAM) TOPICAL
Qty: 90 TABLET | Refills: 3 | Status: SHIPPED | OUTPATIENT
Start: 2021-01-01

## 2021-01-01 RX ORDER — SODIUM CHLORIDE 0.9 % (FLUSH) 0.9 %
3-10 SYRINGE (ML) INJECTION AS NEEDED
Status: DISCONTINUED | OUTPATIENT
Start: 2021-01-01 | End: 2021-01-01

## 2021-01-01 RX ORDER — DEXTROSE MONOHYDRATE 25 G/50ML
25 INJECTION, SOLUTION INTRAVENOUS
Status: DISCONTINUED | OUTPATIENT
Start: 2021-01-01 | End: 2021-01-01 | Stop reason: HOSPADM

## 2021-01-01 RX ORDER — PRAVASTATIN SODIUM 40 MG
40 TABLET ORAL DAILY
Status: DISCONTINUED | OUTPATIENT
Start: 2021-01-01 | End: 2021-01-01

## 2021-01-01 RX ORDER — BUDESONIDE AND FORMOTEROL FUMARATE DIHYDRATE 80; 4.5 UG/1; UG/1
2 AEROSOL RESPIRATORY (INHALATION)
Status: DISCONTINUED | OUTPATIENT
Start: 2021-01-01 | End: 2021-01-01 | Stop reason: HOSPADM

## 2021-01-01 RX ORDER — BUDESONIDE AND FORMOTEROL FUMARATE DIHYDRATE 160; 4.5 UG/1; UG/1
2 AEROSOL RESPIRATORY (INHALATION)
Status: DISCONTINUED | OUTPATIENT
Start: 2021-01-01 | End: 2021-01-01

## 2021-01-01 RX ORDER — SODIUM CHLORIDE 0.9 % (FLUSH) 0.9 %
10 SYRINGE (ML) INJECTION EVERY 12 HOURS SCHEDULED
Status: DISCONTINUED | OUTPATIENT
Start: 2021-01-01 | End: 2021-01-01 | Stop reason: HOSPADM

## 2021-01-01 RX ADMIN — BUDESONIDE AND FORMOTEROL FUMARATE DIHYDRATE 2 PUFF: 80; 4.5 AEROSOL RESPIRATORY (INHALATION) at 10:53

## 2021-01-01 RX ADMIN — PIOGLITAZONE 15 MG: 15 TABLET ORAL at 09:00

## 2021-01-01 RX ADMIN — METOPROLOL TARTRATE 25 MG: 25 TABLET, FILM COATED ORAL at 08:46

## 2021-01-01 RX ADMIN — CARBIDOPA AND LEVODOPA 1 TABLET: 25; 100 TABLET ORAL at 09:56

## 2021-01-01 RX ADMIN — FERROUS SULFATE TAB 325 MG (65 MG ELEMENTAL FE) 325 MG: 325 (65 FE) TAB at 12:00

## 2021-01-01 RX ADMIN — CARBIDOPA AND LEVODOPA 1 TABLET: 25; 100 TABLET ORAL at 20:21

## 2021-01-01 RX ADMIN — PRAVASTATIN SODIUM 40 MG: 40 TABLET ORAL at 09:41

## 2021-01-01 RX ADMIN — CARBIDOPA AND LEVODOPA 1 TABLET: 25; 100 TABLET ORAL at 22:42

## 2021-01-01 RX ADMIN — SODIUM CHLORIDE 1000 ML: 9 INJECTION, SOLUTION INTRAVENOUS at 08:25

## 2021-01-01 RX ADMIN — MONTELUKAST SODIUM 10 MG: 10 TABLET, FILM COATED ORAL at 20:09

## 2021-01-01 RX ADMIN — CARBIDOPA AND LEVODOPA 1 TABLET: 25; 100 TABLET ORAL at 17:27

## 2021-01-01 RX ADMIN — PANTOPRAZOLE SODIUM 8 MG/HR: 40 INJECTION, POWDER, FOR SOLUTION INTRAVENOUS at 09:56

## 2021-01-01 RX ADMIN — CARBIDOPA AND LEVODOPA 1 TABLET: 25; 100 TABLET ORAL at 09:42

## 2021-01-01 RX ADMIN — METOPROLOL TARTRATE 25 MG: 25 TABLET, FILM COATED ORAL at 20:33

## 2021-01-01 RX ADMIN — SODIUM CHLORIDE, PRESERVATIVE FREE 10 ML: 5 INJECTION INTRAVENOUS at 08:50

## 2021-01-01 RX ADMIN — SODIUM CHLORIDE, PRESERVATIVE FREE 3 ML: 5 INJECTION INTRAVENOUS at 22:54

## 2021-01-01 RX ADMIN — SODIUM CHLORIDE, POTASSIUM CHLORIDE, SODIUM LACTATE AND CALCIUM CHLORIDE 50 ML/HR: 600; 310; 30; 20 INJECTION, SOLUTION INTRAVENOUS at 11:41

## 2021-01-01 RX ADMIN — METRONIDAZOLE 500 MG: 500 INJECTION, SOLUTION INTRAVENOUS at 10:13

## 2021-01-01 RX ADMIN — PROPOFOL 100 MG: 10 INJECTION, EMULSION INTRAVENOUS at 13:45

## 2021-01-01 RX ADMIN — CARBIDOPA AND LEVODOPA 1 TABLET: 25; 100 TABLET ORAL at 16:53

## 2021-01-01 RX ADMIN — FERROUS SULFATE TAB 325 MG (65 MG ELEMENTAL FE) 325 MG: 325 (65 FE) TAB at 08:44

## 2021-01-01 RX ADMIN — PRAVASTATIN SODIUM 40 MG: 40 TABLET ORAL at 10:18

## 2021-01-01 RX ADMIN — AMANTADINE HYDROCHLORIDE 100 MG: 100 CAPSULE ORAL at 10:20

## 2021-01-01 RX ADMIN — AMANTADINE HYDROCHLORIDE 100 MG: 100 CAPSULE ORAL at 17:06

## 2021-01-01 RX ADMIN — PANTOPRAZOLE SODIUM 80 MG: 40 INJECTION, POWDER, FOR SOLUTION INTRAVENOUS at 08:18

## 2021-01-01 RX ADMIN — SODIUM CHLORIDE, PRESERVATIVE FREE 3 ML: 5 INJECTION INTRAVENOUS at 09:43

## 2021-01-01 RX ADMIN — PERFLUTREN 2 ML: 6.52 INJECTION, SUSPENSION INTRAVENOUS at 15:15

## 2021-01-01 RX ADMIN — APIXABAN 5 MG: 5 TABLET, FILM COATED ORAL at 20:09

## 2021-01-01 RX ADMIN — AMANTADINE HYDROCHLORIDE 100 MG: 100 CAPSULE ORAL at 21:57

## 2021-01-01 RX ADMIN — AMANTADINE HYDROCHLORIDE 100 MG: 100 CAPSULE ORAL at 20:34

## 2021-01-01 RX ADMIN — CALCIUM CHLORIDE, MAGNESIUM CHLORIDE, SODIUM CHLORIDE, SODIUM BICARBONATE, POTASSIUM CHLORIDE AND SODIUM PHOSPHATE DIBASIC DIHYDRATE 1000 ML/HR: 3.68; 3.05; 6.34; 3.09; .314; .187 INJECTION INTRAVENOUS at 00:24

## 2021-01-01 RX ADMIN — SODIUM BICARBONATE 150 MEQ/1,000 ML IN DEXTROSE 5 % INTRAVENOUS 150 MEQ: SOLUTION at 11:47

## 2021-01-01 RX ADMIN — Medication 1000 UNITS: at 09:42

## 2021-01-01 RX ADMIN — MONTELUKAST SODIUM 10 MG: 10 TABLET, FILM COATED ORAL at 22:42

## 2021-01-01 RX ADMIN — AMANTADINE HYDROCHLORIDE 100 MG: 100 CAPSULE ORAL at 08:46

## 2021-01-01 RX ADMIN — CARBIDOPA AND LEVODOPA 1 TABLET: 25; 100 TABLET ORAL at 16:50

## 2021-01-01 RX ADMIN — SODIUM CHLORIDE 75 ML/HR: 9 INJECTION, SOLUTION INTRAVENOUS at 10:20

## 2021-01-01 RX ADMIN — LORAZEPAM 2 MG: 2 INJECTION INTRAMUSCULAR at 15:48

## 2021-01-01 RX ADMIN — CETIRIZINE HYDROCHLORIDE ALLERGY 10 MG: 10 TABLET ORAL at 08:43

## 2021-01-01 RX ADMIN — PRAVASTATIN SODIUM 40 MG: 40 TABLET ORAL at 20:10

## 2021-01-01 RX ADMIN — PANTOPRAZOLE SODIUM 8 MG/HR: 40 INJECTION, POWDER, FOR SOLUTION INTRAVENOUS at 12:32

## 2021-01-01 RX ADMIN — CEFTRIAXONE SODIUM 1 G: 1 INJECTION, SOLUTION INTRAVENOUS at 18:05

## 2021-01-01 RX ADMIN — BUDESONIDE AND FORMOTEROL FUMARATE DIHYDRATE 2 PUFF: 80; 4.5 AEROSOL RESPIRATORY (INHALATION) at 09:48

## 2021-01-01 RX ADMIN — MONTELUKAST SODIUM 10 MG: 10 TABLET, FILM COATED ORAL at 22:43

## 2021-01-01 RX ADMIN — BUDESONIDE AND FORMOTEROL FUMARATE DIHYDRATE 2 PUFF: 80; 4.5 AEROSOL RESPIRATORY (INHALATION) at 20:43

## 2021-01-01 RX ADMIN — CARBIDOPA AND LEVODOPA 1 TABLET: 25; 100 TABLET ORAL at 20:09

## 2021-01-01 RX ADMIN — AMANTADINE HYDROCHLORIDE 100 MG: 100 CAPSULE ORAL at 20:20

## 2021-01-01 RX ADMIN — PHYTONADIONE 5 MG: 5 TABLET ORAL at 14:44

## 2021-01-01 RX ADMIN — CALCIUM GLUCONATE 1 G: 20 INJECTION, SOLUTION INTRAVENOUS at 12:10

## 2021-01-01 RX ADMIN — IOPAMIDOL 150 ML: 755 INJECTION, SOLUTION INTRAVENOUS at 00:30

## 2021-01-01 RX ADMIN — DOCUSATE SODIUM 100 MG: 100 CAPSULE, LIQUID FILLED ORAL at 09:40

## 2021-01-01 RX ADMIN — HYDROCORTISONE SODIUM SUCCINATE 100 MG: 100 INJECTION, POWDER, FOR SOLUTION INTRAMUSCULAR; INTRAVENOUS at 09:22

## 2021-01-01 RX ADMIN — INSULIN HUMAN 6 UNITS: 100 INJECTION, SOLUTION PARENTERAL at 18:50

## 2021-01-01 RX ADMIN — ALLOPURINOL 100 MG: 100 TABLET ORAL at 10:20

## 2021-01-01 RX ADMIN — SODIUM CHLORIDE, POTASSIUM CHLORIDE, SODIUM LACTATE AND CALCIUM CHLORIDE 100 ML/HR: 600; 310; 30; 20 INJECTION, SOLUTION INTRAVENOUS at 12:32

## 2021-01-01 RX ADMIN — CALCIUM CHLORIDE, MAGNESIUM CHLORIDE, SODIUM CHLORIDE, SODIUM BICARBONATE, POTASSIUM CHLORIDE AND SODIUM PHOSPHATE DIBASIC DIHYDRATE 1000 ML/HR: 3.68; 3.05; 6.34; 3.09; .314; .187 INJECTION INTRAVENOUS at 05:44

## 2021-01-01 RX ADMIN — ALLOPURINOL 100 MG: 100 TABLET ORAL at 08:43

## 2021-01-01 RX ADMIN — SODIUM CHLORIDE 75 ML/HR: 9 INJECTION, SOLUTION INTRAVENOUS at 22:43

## 2021-01-01 RX ADMIN — ALLOPURINOL 100 MG: 100 TABLET ORAL at 08:46

## 2021-01-01 RX ADMIN — AMANTADINE HYDROCHLORIDE 100 MG: 100 CAPSULE ORAL at 09:00

## 2021-01-01 RX ADMIN — CARBIDOPA AND LEVODOPA 1 TABLET: 25; 100 TABLET ORAL at 16:58

## 2021-01-01 RX ADMIN — ALLOPURINOL 100 MG: 100 TABLET ORAL at 08:44

## 2021-01-01 RX ADMIN — ALPRAZOLAM 0.25 MG: 0.25 TABLET ORAL at 20:47

## 2021-01-01 RX ADMIN — AMANTADINE HYDROCHLORIDE 100 MG: 100 CAPSULE ORAL at 09:42

## 2021-01-01 RX ADMIN — SODIUM CHLORIDE 500 ML: 9 INJECTION, SOLUTION INTRAVENOUS at 13:23

## 2021-01-01 RX ADMIN — BUDESONIDE AND FORMOTEROL FUMARATE DIHYDRATE 2 PUFF: 80; 4.5 AEROSOL RESPIRATORY (INHALATION) at 20:48

## 2021-01-01 RX ADMIN — PANTOPRAZOLE SODIUM 8 MG/HR: 40 INJECTION, POWDER, FOR SOLUTION INTRAVENOUS at 23:02

## 2021-01-01 RX ADMIN — BARIUM SULFATE 50 ML: 400 SUSPENSION ORAL at 15:06

## 2021-01-01 RX ADMIN — CARBIDOPA AND LEVODOPA 1 TABLET: 25; 100 TABLET ORAL at 17:06

## 2021-01-01 RX ADMIN — CETIRIZINE HYDROCHLORIDE ALLERGY 10 MG: 10 TABLET ORAL at 08:41

## 2021-01-01 RX ADMIN — ALLOPURINOL 100 MG: 100 TABLET ORAL at 09:57

## 2021-01-01 RX ADMIN — MAGNESIUM SULFATE HEPTAHYDRATE 2 G: 2 INJECTION, SOLUTION INTRAVENOUS at 03:53

## 2021-01-01 RX ADMIN — BUDESONIDE AND FORMOTEROL FUMARATE DIHYDRATE 2 PUFF: 160; 4.5 AEROSOL RESPIRATORY (INHALATION) at 07:35

## 2021-01-01 RX ADMIN — AMANTADINE HYDROCHLORIDE 100 MG: 100 CAPSULE ORAL at 09:57

## 2021-01-01 RX ADMIN — SODIUM CHLORIDE, POTASSIUM CHLORIDE, SODIUM LACTATE AND CALCIUM CHLORIDE 100 ML/HR: 600; 310; 30; 20 INJECTION, SOLUTION INTRAVENOUS at 23:03

## 2021-01-01 RX ADMIN — HYDROCORTISONE SODIUM SUCCINATE 50 MG: 100 INJECTION, POWDER, FOR SOLUTION INTRAMUSCULAR; INTRAVENOUS at 17:26

## 2021-01-01 RX ADMIN — LIDOCAINE HYDROCHLORIDE 100 MG: 20 INJECTION, SOLUTION INFILTRATION; PERINEURAL at 13:45

## 2021-01-01 RX ADMIN — HYDROCORTISONE SODIUM SUCCINATE 50 MG: 100 INJECTION, POWDER, FOR SOLUTION INTRAMUSCULAR; INTRAVENOUS at 01:13

## 2021-01-01 RX ADMIN — SODIUM CHLORIDE, PRESERVATIVE FREE 10 ML: 5 INJECTION INTRAVENOUS at 08:42

## 2021-01-01 RX ADMIN — CALCIUM GLUCONATE 1 G: 20 INJECTION, SOLUTION INTRAVENOUS at 17:24

## 2021-01-01 RX ADMIN — SODIUM CHLORIDE, PRESERVATIVE FREE 3 ML: 5 INJECTION INTRAVENOUS at 22:47

## 2021-01-01 RX ADMIN — ALBUTEROL SULFATE 10 MG: 2.5 SOLUTION RESPIRATORY (INHALATION) at 12:15

## 2021-01-01 RX ADMIN — HYDROMORPHONE HYDROCHLORIDE 1 MG: 1 INJECTION, SOLUTION INTRAMUSCULAR; INTRAVENOUS; SUBCUTANEOUS at 16:29

## 2021-01-01 RX ADMIN — BUDESONIDE AND FORMOTEROL FUMARATE DIHYDRATE 2 PUFF: 80; 4.5 AEROSOL RESPIRATORY (INHALATION) at 08:33

## 2021-01-01 RX ADMIN — SODIUM CHLORIDE, POTASSIUM CHLORIDE, SODIUM LACTATE AND CALCIUM CHLORIDE 50 ML/HR: 600; 310; 30; 20 INJECTION, SOLUTION INTRAVENOUS at 10:14

## 2021-01-01 RX ADMIN — PANTOPRAZOLE SODIUM 8 MG/HR: 40 INJECTION, POWDER, FOR SOLUTION INTRAVENOUS at 18:48

## 2021-01-01 RX ADMIN — SODIUM CHLORIDE, PRESERVATIVE FREE 3 ML: 5 INJECTION INTRAVENOUS at 09:23

## 2021-01-01 RX ADMIN — CARBIDOPA AND LEVODOPA 1 TABLET: 25; 100 TABLET ORAL at 20:27

## 2021-01-01 RX ADMIN — DOCUSATE SODIUM 100 MG: 100 CAPSULE, LIQUID FILLED ORAL at 22:43

## 2021-01-01 RX ADMIN — SODIUM CHLORIDE, PRESERVATIVE FREE 3 ML: 5 INJECTION INTRAVENOUS at 20:28

## 2021-01-01 RX ADMIN — CARBIDOPA AND LEVODOPA 1 TABLET: 25; 100 TABLET ORAL at 10:19

## 2021-01-01 RX ADMIN — SODIUM CHLORIDE, PRESERVATIVE FREE 10 ML: 5 INJECTION INTRAVENOUS at 09:01

## 2021-01-01 RX ADMIN — METOPROLOL TARTRATE 50 MG: 50 TABLET, FILM COATED ORAL at 08:59

## 2021-01-01 RX ADMIN — APIXABAN 5 MG: 5 TABLET, FILM COATED ORAL at 09:00

## 2021-01-01 RX ADMIN — PIOGLITAZONE 15 MG: 15 TABLET ORAL at 09:42

## 2021-01-01 RX ADMIN — ALLOPURINOL 100 MG: 100 TABLET ORAL at 09:00

## 2021-01-01 RX ADMIN — METOPROLOL TARTRATE 50 MG: 50 TABLET, FILM COATED ORAL at 20:10

## 2021-01-01 RX ADMIN — INSULIN HUMAN 2 UNITS: 100 INJECTION, SOLUTION PARENTERAL at 16:59

## 2021-01-01 RX ADMIN — BUDESONIDE AND FORMOTEROL FUMARATE DIHYDRATE 2 PUFF: 80; 4.5 AEROSOL RESPIRATORY (INHALATION) at 20:03

## 2021-01-01 RX ADMIN — PANTOPRAZOLE SODIUM 8 MG/HR: 40 INJECTION, POWDER, FOR SOLUTION INTRAVENOUS at 08:18

## 2021-01-01 RX ADMIN — MONTELUKAST SODIUM 10 MG: 10 TABLET, FILM COATED ORAL at 20:34

## 2021-01-01 RX ADMIN — BUDESONIDE AND FORMOTEROL FUMARATE DIHYDRATE 2 PUFF: 160; 4.5 AEROSOL RESPIRATORY (INHALATION) at 10:51

## 2021-01-01 RX ADMIN — AMANTADINE HYDROCHLORIDE 100 MG: 100 CAPSULE ORAL at 20:09

## 2021-01-01 RX ADMIN — CARBIDOPA AND LEVODOPA 1 TABLET: 25; 100 TABLET ORAL at 08:44

## 2021-01-01 RX ADMIN — AMANTADINE HYDROCHLORIDE 100 MG: 100 CAPSULE ORAL at 08:41

## 2021-01-01 RX ADMIN — BUDESONIDE AND FORMOTEROL FUMARATE DIHYDRATE 2 PUFF: 80; 4.5 AEROSOL RESPIRATORY (INHALATION) at 19:14

## 2021-01-01 RX ADMIN — DOCUSATE SODIUM 100 MG: 100 CAPSULE, LIQUID FILLED ORAL at 10:21

## 2021-01-01 RX ADMIN — CARBIDOPA AND LEVODOPA 1 TABLET: 25; 100 TABLET ORAL at 21:57

## 2021-01-01 RX ADMIN — GUAIFENESIN AND CODEINE PHOSPHATE 5 ML: 10; 100 LIQUID ORAL at 22:04

## 2021-01-01 RX ADMIN — CALCIUM CHLORIDE, MAGNESIUM CHLORIDE, SODIUM CHLORIDE, SODIUM BICARBONATE, POTASSIUM CHLORIDE AND SODIUM PHOSPHATE DIBASIC DIHYDRATE 1000 ML/HR: 3.68; 3.05; 6.34; 3.09; .314; .187 INJECTION INTRAVENOUS at 10:52

## 2021-01-01 RX ADMIN — BARIUM SULFATE 4 ML: 980 POWDER, FOR SUSPENSION ORAL at 15:06

## 2021-01-01 RX ADMIN — BUDESONIDE AND FORMOTEROL FUMARATE DIHYDRATE 2 PUFF: 80; 4.5 AEROSOL RESPIRATORY (INHALATION) at 19:13

## 2021-01-01 RX ADMIN — PANTOPRAZOLE SODIUM 8 MG/HR: 40 INJECTION, POWDER, FOR SOLUTION INTRAVENOUS at 14:05

## 2021-01-01 RX ADMIN — FERROUS SULFATE TAB 325 MG (65 MG ELEMENTAL FE) 325 MG: 325 (65 FE) TAB at 10:20

## 2021-01-01 RX ADMIN — SODIUM CHLORIDE 500 MG: 900 INJECTION INTRAVENOUS at 11:45

## 2021-01-01 RX ADMIN — FUROSEMIDE 80 MG: 10 INJECTION, SOLUTION INTRAVENOUS at 12:05

## 2021-01-01 RX ADMIN — INSULIN HUMAN 2 UNITS: 100 INJECTION, SOLUTION PARENTERAL at 17:28

## 2021-01-01 RX ADMIN — PRAVASTATIN SODIUM 40 MG: 40 TABLET ORAL at 21:57

## 2021-01-01 RX ADMIN — ALLOPURINOL 100 MG: 100 TABLET ORAL at 17:06

## 2021-01-01 RX ADMIN — BUDESONIDE AND FORMOTEROL FUMARATE DIHYDRATE 2 PUFF: 80; 4.5 AEROSOL RESPIRATORY (INHALATION) at 07:27

## 2021-01-01 RX ADMIN — CARBIDOPA AND LEVODOPA 1 TABLET: 25; 100 TABLET ORAL at 20:32

## 2021-01-01 RX ADMIN — SODIUM BICARBONATE 150 MEQ/1,000 ML IN DEXTROSE 5 % INTRAVENOUS 150 MEQ: SOLUTION at 01:23

## 2021-01-01 RX ADMIN — AMANTADINE HYDROCHLORIDE 100 MG: 100 CAPSULE ORAL at 08:44

## 2021-01-01 RX ADMIN — METOPROLOL TARTRATE 25 MG: 25 TABLET, FILM COATED ORAL at 20:47

## 2021-01-01 RX ADMIN — BARIUM SULFATE 55 ML: 0.81 POWDER, FOR SUSPENSION ORAL at 15:05

## 2021-01-01 RX ADMIN — CALCIUM CHLORIDE, MAGNESIUM CHLORIDE, SODIUM CHLORIDE, SODIUM BICARBONATE, POTASSIUM CHLORIDE AND SODIUM PHOSPHATE DIBASIC DIHYDRATE 1000 ML/HR: 3.68; 3.05; 6.34; 3.09; .314; .187 INJECTION INTRAVENOUS at 10:54

## 2021-01-01 RX ADMIN — BUDESONIDE AND FORMOTEROL FUMARATE DIHYDRATE 2 PUFF: 160; 4.5 AEROSOL RESPIRATORY (INHALATION) at 16:50

## 2021-01-01 RX ADMIN — CALCIUM GLUCONATE 1 G: 20 INJECTION, SOLUTION INTRAVENOUS at 18:04

## 2021-01-01 RX ADMIN — METOPROLOL TARTRATE 50 MG: 50 TABLET, FILM COATED ORAL at 09:00

## 2021-01-01 RX ADMIN — PHYTONADIONE 10 MG: 10 INJECTION, EMULSION INTRAMUSCULAR; INTRAVENOUS; SUBCUTANEOUS at 11:47

## 2021-01-01 RX ADMIN — FERROUS SULFATE TAB 325 MG (65 MG ELEMENTAL FE) 325 MG: 325 (65 FE) TAB at 08:41

## 2021-01-01 RX ADMIN — MONTELUKAST SODIUM 10 MG: 10 TABLET, FILM COATED ORAL at 20:27

## 2021-01-01 RX ADMIN — DEXTROSE MONOHYDRATE 50 ML: 500 INJECTION PARENTERAL at 11:39

## 2021-01-01 RX ADMIN — APIXABAN 5 MG: 5 TABLET, FILM COATED ORAL at 08:44

## 2021-01-01 RX ADMIN — SODIUM CHLORIDE, PRESERVATIVE FREE 10 ML: 5 INJECTION INTRAVENOUS at 20:47

## 2021-01-01 RX ADMIN — METOPROLOL TARTRATE 25 MG: 25 TABLET, FILM COATED ORAL at 08:40

## 2021-01-01 RX ADMIN — METOPROLOL TARTRATE 50 MG: 50 TABLET, FILM COATED ORAL at 22:43

## 2021-01-01 RX ADMIN — FERROUS SULFATE TAB 325 MG (65 MG ELEMENTAL FE) 325 MG: 325 (65 FE) TAB at 09:00

## 2021-01-01 RX ADMIN — CALCIUM CHLORIDE, MAGNESIUM CHLORIDE, SODIUM CHLORIDE, SODIUM BICARBONATE, POTASSIUM CHLORIDE AND SODIUM PHOSPHATE DIBASIC DIHYDRATE 1000 ML/HR: 3.68; 3.05; 6.34; 3.09; .314; .187 INJECTION INTRAVENOUS at 10:53

## 2021-01-01 RX ADMIN — INSULIN HUMAN 2 UNITS: 100 INJECTION, SOLUTION PARENTERAL at 12:10

## 2021-01-01 RX ADMIN — PIOGLITAZONE 15 MG: 15 TABLET ORAL at 10:19

## 2021-01-01 RX ADMIN — DEXTROSE MONOHYDRATE 50 ML: 500 INJECTION PARENTERAL at 13:28

## 2021-01-01 RX ADMIN — ALLOPURINOL 100 MG: 100 TABLET ORAL at 09:42

## 2021-01-01 RX ADMIN — SODIUM BICARBONATE 50 MEQ: 84 INJECTION INTRAVENOUS at 12:05

## 2021-01-01 RX ADMIN — PANTOPRAZOLE SODIUM 8 MG/HR: 40 INJECTION, POWDER, FOR SOLUTION INTRAVENOUS at 04:13

## 2021-01-01 RX ADMIN — SODIUM BICARBONATE 150 MEQ/1,000 ML IN DEXTROSE 5 % INTRAVENOUS 150 MEQ: SOLUTION at 21:10

## 2021-01-01 RX ADMIN — SODIUM CHLORIDE, PRESERVATIVE FREE 10 ML: 5 INJECTION INTRAVENOUS at 20:35

## 2021-01-01 RX ADMIN — CARBIDOPA AND LEVODOPA 1 TABLET: 25; 100 TABLET ORAL at 08:41

## 2021-01-01 RX ADMIN — DOCUSATE SODIUM 100 MG: 100 CAPSULE, LIQUID FILLED ORAL at 20:32

## 2021-01-01 RX ADMIN — AMANTADINE HYDROCHLORIDE 100 MG: 100 CAPSULE ORAL at 20:27

## 2021-01-01 RX ADMIN — SODIUM CHLORIDE, PRESERVATIVE FREE 3 ML: 5 INJECTION INTRAVENOUS at 10:21

## 2021-01-01 RX ADMIN — FAMOTIDINE 20 MG: 10 INJECTION INTRAVENOUS at 12:36

## 2021-01-01 RX ADMIN — CARBIDOPA AND LEVODOPA 1 TABLET: 25; 100 TABLET ORAL at 20:34

## 2021-01-01 RX ADMIN — LORAZEPAM 2 MG: 2 INJECTION INTRAMUSCULAR; INTRAVENOUS at 15:48

## 2021-01-01 RX ADMIN — PIOGLITAZONE 15 MG: 15 TABLET ORAL at 08:41

## 2021-01-01 RX ADMIN — PROTHROMBIN, COAGULATION FACTOR VII HUMAN, COAGULATION FACTOR IX HUMAN, COAGULATION FACTOR X HUMAN, PROTEIN C, PROTEIN S HUMAN, AND WATER 3381 UNITS: KIT at 09:20

## 2021-01-01 RX ADMIN — CARBIDOPA AND LEVODOPA 1 TABLET: 25; 100 TABLET ORAL at 15:38

## 2021-01-01 RX ADMIN — SODIUM CHLORIDE, PRESERVATIVE FREE 10 ML: 5 INJECTION INTRAVENOUS at 20:27

## 2021-01-01 RX ADMIN — ACETAMINOPHEN 650 MG: 325 TABLET, FILM COATED ORAL at 06:23

## 2021-01-01 RX ADMIN — DEXTROSE MONOHYDRATE 50 ML: 500 INJECTION PARENTERAL at 12:01

## 2021-01-01 RX ADMIN — APIXABAN 5 MG: 5 TABLET, FILM COATED ORAL at 22:41

## 2021-01-01 RX ADMIN — SODIUM CHLORIDE, PRESERVATIVE FREE 10 ML: 5 INJECTION INTRAVENOUS at 08:44

## 2021-01-01 RX ADMIN — METOPROLOL TARTRATE 50 MG: 50 TABLET, FILM COATED ORAL at 10:19

## 2021-01-01 RX ADMIN — SODIUM CHLORIDE 75 ML/HR: 9 INJECTION, SOLUTION INTRAVENOUS at 09:52

## 2021-01-01 RX ADMIN — PIOGLITAZONE 15 MG: 15 TABLET ORAL at 08:43

## 2021-01-01 RX ADMIN — METOPROLOL TARTRATE 75 MG: 25 TABLET, FILM COATED ORAL at 22:42

## 2021-01-01 RX ADMIN — CARBIDOPA AND LEVODOPA 1 TABLET: 25; 100 TABLET ORAL at 18:21

## 2021-01-01 RX ADMIN — CETIRIZINE HYDROCHLORIDE ALLERGY 10 MG: 10 TABLET ORAL at 09:00

## 2021-01-01 RX ADMIN — DOCUSATE SODIUM 100 MG: 100 CAPSULE, LIQUID FILLED ORAL at 22:42

## 2021-01-01 RX ADMIN — Medication 1000 UNITS: at 10:19

## 2021-01-01 RX ADMIN — SODIUM CHLORIDE, PRESERVATIVE FREE 10 ML: 5 INJECTION INTRAVENOUS at 20:23

## 2021-01-01 RX ADMIN — PHYTONADIONE 10 MG: 10 INJECTION, EMULSION INTRAMUSCULAR; INTRAVENOUS; SUBCUTANEOUS at 09:17

## 2021-01-01 RX ADMIN — MONTELUKAST SODIUM 10 MG: 10 TABLET, FILM COATED ORAL at 21:57

## 2021-01-01 RX ADMIN — METRONIDAZOLE 500 MG: 500 INJECTION, SOLUTION INTRAVENOUS at 01:22

## 2021-01-01 RX ADMIN — AMANTADINE HYDROCHLORIDE 100 MG: 100 CAPSULE ORAL at 22:43

## 2021-01-01 RX ADMIN — ALPRAZOLAM 0.25 MG: 0.25 TABLET ORAL at 20:11

## 2021-01-01 RX ADMIN — CARBIDOPA AND LEVODOPA 1 TABLET: 25; 100 TABLET ORAL at 08:46

## 2021-01-01 RX ADMIN — CARBIDOPA AND LEVODOPA 1 TABLET: 25; 100 TABLET ORAL at 22:43

## 2021-01-01 RX ADMIN — SODIUM BICARBONATE: 84 INJECTION, SOLUTION INTRAVENOUS at 14:29

## 2021-01-01 RX ADMIN — CARBIDOPA AND LEVODOPA 1 TABLET: 25; 100 TABLET ORAL at 09:00

## 2021-01-01 RX ADMIN — SODIUM CHLORIDE 150 ML/HR: 9 INJECTION, SOLUTION INTRAVENOUS at 13:23

## 2021-01-01 RX ADMIN — AMANTADINE HYDROCHLORIDE 100 MG: 100 CAPSULE ORAL at 22:41

## 2021-01-01 RX ADMIN — SODIUM BICARBONATE 150 MEQ/1,000 ML IN DEXTROSE 5 % INTRAVENOUS 150 MEQ: SOLUTION at 06:24

## 2021-01-01 RX ADMIN — PHENYLEPHRINE HYDROCHLORIDE 2 SPRAY: 0.5 SPRAY NASAL at 11:56

## 2021-01-01 RX ADMIN — FERROUS SULFATE TAB 325 MG (65 MG ELEMENTAL FE) 325 MG: 325 (65 FE) TAB at 09:42

## 2021-01-01 RX ADMIN — METRONIDAZOLE 500 MG: 500 INJECTION, SOLUTION INTRAVENOUS at 17:36

## 2021-01-01 RX ADMIN — HEPARIN SODIUM 3000 UNITS: 1000 INJECTION INTRAVENOUS; SUBCUTANEOUS at 17:21

## 2021-01-01 RX ADMIN — SODIUM BICARBONATE 50 MEQ: 84 INJECTION INTRAVENOUS at 15:37

## 2021-01-01 RX ADMIN — BUDESONIDE AND FORMOTEROL FUMARATE DIHYDRATE 2 PUFF: 80; 4.5 AEROSOL RESPIRATORY (INHALATION) at 08:34

## 2021-01-01 RX ADMIN — SODIUM CHLORIDE, PRESERVATIVE FREE 10 ML: 5 INJECTION INTRAVENOUS at 20:10

## 2021-01-01 RX ADMIN — MONTELUKAST SODIUM 10 MG: 10 TABLET, FILM COATED ORAL at 20:21

## 2021-01-30 PROBLEM — D64.9 ANEMIA: Status: ACTIVE | Noted: 2021-01-01

## 2021-01-30 PROBLEM — T45.511A POISONING BY WARFARIN SODIUM: Status: ACTIVE | Noted: 2021-01-01

## 2021-01-31 NOTE — ANESTHESIA PREPROCEDURE EVALUATION
Anesthesia Evaluation     Patient summary reviewed and Nursing notes reviewed   NPO Solid Status: > 8 hours  NPO Liquid Status: > 8 hours           Airway   Mallampati: III  Neck ROM: full  Possible difficult intubation  Dental    (+) partials    Pulmonary     breath sounds clear to auscultation  (+) a smoker Former,   Cardiovascular     Rhythm: regular    (+) hypertension, CAD, cardiac stents dysrhythmias Atrial Fib, angina, hyperlipidemia,       Neuro/Psych  (+) psychiatric history Anxiety,       ROS Comment: parkinsons  GI/Hepatic/Renal/Endo    (+)  GI bleeding , diabetes mellitus,     Musculoskeletal     Abdominal    Substance History      OB/GYN          Other                          Anesthesia Plan    ASA 4     MAC     intravenous induction     Anesthetic plan, all risks, benefits, and alternatives have been provided, discussed and informed consent has been obtained with: patient.

## 2021-01-31 NOTE — ANESTHESIA POSTPROCEDURE EVALUATION
"Patient: Dustin Villeda    Procedure Summary     Date: 01/31/21 [facility] / [facility]:  [facility]    Anesthesia Start: 1343 Anesthesia Stop: 1358    Procedure: ESOPHAGOGASTRODUODENOSCOPY AT BEDSIDE (Esophagus) Diagnosis:       Anemia, unspecified type      (Anemia, unspecified type [D64.9])    Surgeon: Geoff Rosenthal MD Provider:     Anesthesia Type: MAC ASA Status: 4          Anesthesia Type: MAC    Vitals  Vitals Value Taken Time   /67 01/31/21 1400   Temp     Pulse 105 01/31/21 1402   Resp     SpO2 99 % 01/31/21 1402   Vitals shown include unvalidated device data.        Post Anesthesia Care and Evaluation    Patient location during evaluation: bedside  Patient participation: complete - patient participated  Level of consciousness: awake and alert  Pain management: adequate  Airway patency: patent  Anesthetic complications: No anesthetic complications    Cardiovascular status: acceptable  Respiratory status: acceptable  Hydration status: acceptable    Comments: /73   Pulse 114   Temp 36.7 °C (98.1 °F) (Oral)   Resp 19   Ht 172.7 cm (68\")   Wt 71.2 kg (156 lb 15.5 oz)   SpO2 96%   BMI 23.87 kg/m²       " LEFT Dense hemiparesis of upper and lower extremity   decreased sensation of left upper and lower extremity   LEFT side facial droop

## 2021-02-01 NOTE — PROGRESS NOTES
Discharge Planning Assessment  [facility]     Patient Name: Dustin Villeda  MRN: [id number]  Today's Date: 2/1/2021    Admit Date: 1/30/2021    Discharge Needs Assessment     Row Name 02/01/21 1210       Transition Planning    Transportation Anticipated  family or friend will provide    Row Name 02/01/21 1206       Living Environment    Lives With  spouse    Current Living Arrangements  home/apartment/condo    Potentially Unsafe Housing Conditions  unable to assess    Primary Care Provided by  self    Provides Primary Care For  no one    Family Caregiver if Needed  spouse    Quality of Family Relationships  unable to assess       Resource/Environmental Concerns    Home Accessibility Concerns  stairs to enter home    Transportation Concerns  car, none       Transition Planning    Patient/Family Anticipates Transition to  home with family    Patient/Family Anticipated Services at Transition  none    Transportation Anticipated  family or friend will provide       Discharge Needs Assessment    Equipment Currently Used at Home  cane, straight;bath bench    Anticipated Changes Related to Illness  none    Equipment Needed After Discharge  none    Provided Post Acute Provider List?  N/A    Provided Post Acute Provider Quality & Resource List?  N/A        Discharge Plan     Row Name 02/01/21 1210       Plan    Plan Comments  IMM noted CCP spoke to patient at bedside.  CCP role explained and discharge planning discussed. Face sheet verified. Pt. emergency contact is his wife Cecy, [phone].  Pt’s PCP is Dr. Mal Barboza.  Pt lives in a 2 story house with his wife.  He reports he uses a cane to ambulate. He is independent with ADL’s.  He has a prior history of Home Health with [facility]. He has a past history of rehab at [facility].  He obtains his medications from [facility] mail order or [facility] in the [place].  He is signed up for Meds to bed.   Plan is home.  Following for co pay card needs. Pt was given  [employer] co pay card CCP requested Rx be sent to  retail pharmacy to determine pt costs.   CCP following for discharge needs as the patient's clinical course evolves.    Row Name 02/01/21 1209       Plan    Plan  Home  Pt denies needs  [employer] co pay card given  Rx sent to  retail pharmacy        Continued Care and Services - Admitted Since 1/30/2021    Coordination has not been started for this encounter.         Demographic Summary    No documentation.       Functional Status    No documentation.       Psychosocial    No documentation.       Abuse/Neglect    No documentation.       Legal    No documentation.       Substance Abuse    No documentation.       Patient Forms    No documentation.           Ramila Heath RN

## 2021-02-01 NOTE — PROGRESS NOTES
LOS: 3 days   Patient Care Team:  Mal Barboza MD as PCP - General  Mal Barboza MD as PCP - Family Medicine    Chief Complaint:   Chief Complaint   Patient presents with   • Weakness - Generalized   • Fatigue             Today the patient is awake, alert, and seems to be doing relatively well.  He does seem a little befuddled in reference to his medications and what was going on prior to admission.  Generally he has been compliant with medications up until this point when he clearly made an error and continue to take his warfarin.  Apparently he has also been drinking substantially more than usual to the point where it is a problem now.  He says he can stop with no problem.  Apparently he was getting confused about his other medications as well and he needs to let his wife help him more with this issue.  He seems open and agreeable to this as well.  I will call and talk to his wife today about his progress and also about his medications.  The  will look into the cost of Eliquis because that might be an issue with this medication.  His wife might also be able to help.    Interval History:     Patient Complaints: He has no specific complaints but does seem a little confused and disorganized today.  Patient Denies: He denies chest pain or shortness of breath.  No complaints of abdominal pain or nausea.  History taken from: patient chart family RN    Review of Systems:    Overall his review of systems is relatively unremarkable.  As noted he is not have any specific complaints of chest pain or abdominal pain.  He is eating and drinking without difficulty.  He has no complaints in reference to his medications.  He does admit to being a little confused and disoriented but otherwise he is calm and cooperative and understands what his current situation is.  Fortunately he has no other significant complications at this point.  He tolerated his unit of PRBC without any difficulty.          Vital  Signs  Temp:  [98 °F (36.7 °C)-99.2 °F (37.3 °C)] 98.9 °F (37.2 °C)  Pulse:  [] 109  Resp:  [18] 18  BP: (111-154)/(55-97) 154/97    I/O'S  I/O last 3 completed shifts:  In: 4546 [P.O.:1150; I.V.:2597; Blood:345; IV Piggyback:454]  Out: 2030 [Urine:2030]  I/O this shift:  In: -   Out: 75 [Urine:75]    Physical Exam:   General Appearance alert, pleasant, appears stated age, cooperative and a little befuddled and confused today.  Otherwise he is very pleasant and cooperative and open to suggestions.  Lungs clear to auscultation, respirations regular, respirations even and respirations unlabored  Heart tachycardia and an irregular rhythm consistent with atrial fibrillation.  No murmurs are noted.  Abdomen normal bowel sounds, no masses, no hepatomegaly, no splenomegaly, soft non-tender, no guarding and no rebound tenderness  Extremities moves extremities well, no edema, no cyanosis and no redness     Results Review:     I reviewed the patient's new clinical results.  I reviewed the patient's other test results and agree with the interpretation                     Results from last 7 days   Lab Units 02/01/21  0506   SODIUM mmol/L 139   POTASSIUM mmol/L 3.6   CHLORIDE mmol/L 107   CO2 mmol/L 19.0*   BUN mg/dL 51*   CREATININE mg/dL 1.36*   GLUCOSE mg/dL 126*   CALCIUM mg/dL 8.6     Results from last 7 days   Lab Units 01/31/21  0525 01/30/21  0731   TROPONIN T ng/mL 0.011 0.026     Results from last 7 days   Lab Units 01/31/21  2032   WBC 10*3/mm3 8.75   HEMOGLOBIN g/dL 8.9*   HEMATOCRIT % 25.6*   PLATELETS 10*3/mm3 176     Results from last 7 days   Lab Units 01/31/21  0525 01/30/21  2312 01/30/21  0905 01/30/21  0732 01/26/21  1319   INR  1.18* 1.30* >10.00* >10.00* >10.00*   APTT seconds  --   --   --  59.6* 67.6*     Results from last 7 days   Lab Units 01/31/21  0525   MAGNESIUM mg/dL 2.0         Results from last 7 days   Lab Units 01/31/21  0525   PROBNP pg/mL 5,967.0*             Medication Review:    Scheduled Meds:allopurinol, 100 mg, Oral, Daily  amantadine, 100 mg, Oral, Q12H  budesonide-formoterol, 2 puff, Inhalation, BID - RT  carbidopa-levodopa, 1 tablet, Oral, TID  insulin regular, 0-9 Units, Subcutaneous, Q6H  metoprolol tartrate, 25 mg, Oral, Q12H  montelukast, 10 mg, Oral, Nightly  sodium chloride, 10 mL, Intravenous, Q12H      Continuous Infusions:lactated ringers, 50 mL/hr, Last Rate: 50 mL/hr (01/31/21 1141)      PRN Meds:.•  acetaminophen **OR** acetaminophen  •  dextrose  •  dextrose  •  glucagon (human recombinant)  •  ondansetron **OR** ondansetron  •  sodium chloride  •  sodium chloride              Patient Active Problem List   Diagnosis   • Closed right ankle fracture   • Poisoning by warfarin sodium   • Anemia   • Type 2 diabetes mellitus (CMS/HCC)   • Hypertension   • Hyperlipidemia   • Gout   • Parkinson disease (CMS/HCC)   • Anxiety   • Atrial fibrillation (CMS/HCC)   • 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)   • Poor compliance with medication   • Acute kidney injury superimposed on chronic kidney disease (CMS/HCC)   • Acute gastrointestinal bleeding   • Nosebleed (Resolved)   • Melena     #1 Epistaxis/Anemia-after his GI work-up it appears that there are no significant bleeding areas identified.  The suggestion is that he continued to bleed from his epistaxis because his INR was still high and he was noncompliant with his medications.  This point his H&H is stabilized and is received a unit of PRBCs without any complications.    2.  Coumadin toxicity-unfortunately patient became confused about his medications and did not understand the instructions that he received both from the ER and from me directly.  Apparently he was also drinking much more heavily than he had in the past.  Unfortunately this led to persistently high INR and the subsequent persistence in his bleeding leading to his anemia etc.  His INR is is improved at this point.   He is not clear about whether he wants to use Coumadin or something like Eliquis.  The nurse said the  could look into the cost of Eliquis and I will ask his wife to do the same.  Cost could clearly be an issue here.    3.  Atrial fibrillation-recent onset of atrial fibrillation.  He has no idea when it started.  It was just noted on a regular office visit.  At this point his rate is relatively well controlled.  I discussed with the cardiologist the plan to get him back on his beta-blocker and he can try and control his rate with medications.    4.  ASCVD-he has a past history of coronary artery disease but is not had any cardiac symptoms in quite some time.  In the past has gotten exercise and done relatively well.  Since his diagnosis of Parkinson disease has been much more difficult for him.  Nonetheless he is not really had chest pain or other cardiac-like symptoms.    5.  Parkinson's disease-he is on multiple medications and seems to be reasonably stable.  When he was in the office last he was complaining about his balance and gait being worse now.  Likely his alcohol intake was complicating his gait and balance significantly.    6.  CKD 3-his GFR is reasonably stable today.    7.  Diabetes mellitus type 2-for the most part his blood sugars have been relatively well controlled as an outpatient.  He is not made a lot of changes in reference to his diet but he has made some effort.  I will check a hemoglobin A1c today.  Overall his blood sugar has been relatively well controlled.    8.  Hypertension-well-controlled at this point    9.  Gout-well-controlled with medications.    10.  Anxiety-he has some degree of anxiety and may be even some low-grade depression particularly with Covid and the isolation that he has had to deal with.  I think that contributed to his increase in his alcohol intake as it has for many people.            Plan for disposition:Where: home    Mal Barboza,  MD  02/01/21  08:31 EST          Time:

## 2021-02-01 NOTE — PROGRESS NOTES
PROGRESS NOTE  Patient Name: Dustin Villeda  Age/Sex: 80 y.o. male  : 1940  MRN: [id number]    Date of Admission: 2021  Date of Encounter Visit: 21   LOS: 2 days   Patient Care Team:  Mal Barboza MD as PCP - General (Internal Medicine)    Chief Complaint: Came in with acute GI bleed, Coumadin toxicity, chronic atrial fibrillation    Hospital course: Patient was admitted on 2021, received Kcentra and his Coumadin toxicity was reversed, he had total of 2 units of red blood cells.  EGD was done and showed no evidence of any source of GI bleeding and is felt to be all coming from epistaxisno.  Patient has no sign of any active bleeding at this point, he has been off the anticoagulation and off the aspirin since admission.  Hemodynamically stable, tolerating p.o. however per nursing staff he was having some postprandial coughing and there is some concern about aspiration.  Patient is alert and oriented x4, no apparent distress at this point     REVIEW OF SYSTEMS:   CONSTITUTIONAL: no fever or chills  CARDIOVASCULAR: No chest pain, chest pressure or chest discomfort. No palpitations or edema.  Patient is in chronic A. fib  RESPIRATORY: No shortness of breath, cough or sputum.   GASTROINTESTINAL: No anorexia, nausea, vomiting or diarrhea. No abdominal pain no evidence of any ongoing GI bleed clinically.   HEMATOLOGIC: No more melena since yesterday.  Postprandial coughing with reported    Ventilator/Non-Invasive Ventilation Settings (From admission, onward)     on room air            Vital Signs  Temp:  [97.5 °F (36.4 °C)-98 °F (36.7 °C)] 97.5 °F (36.4 °C)  Heart Rate:  [108-142] 130  Resp:  [18-22] 22  BP: (104-143)/(62-96) 143/79  SpO2:  [94 %-100 %] 100 %  on    Device (Oxygen Therapy): room air    Intake/Output Summary (Last 24 hours) at 2021 1231  Last data filed at 2021 0800  Gross per 24 hour   Intake 2800 ml   Output 1075 ml   Net 1725 ml     Flowsheet Rows      First Filed  "Value   Admission Height  172.7 cm (68\") Documented at 01/30/2021 0605   Admission Weight  72.1 kg (159 lb) Documented at 01/30/2021 0605        Body mass index is 24.27 kg/m².      01/30/21  1108 01/31/21  0500 02/01/21  0500   Weight: 70 kg (154 lb 5.2 oz) 71.2 kg (156 lb 15.5 oz) 72.4 kg (159 lb 9.8 oz)       Physical Exam:  GEN:  No acute distress, alert, cooperative, well developed, on room air  EYES:   Sclerae clear. No icterus. PERRL. Normal EOM  ENT:   External ears/nose normal, no oral lesions, no thrush, moist mucous membrane  NECK:  Supple, midline trachea, no JVD  LUNGS: Normal chest on inspection, CTAB, no wheezes. No rhonchi. No crackles. Respirations regular, even and unlabored.   CV: Controlled heart rate with irregular rhythm. Normal S1/S2. No murmurs, gallops, or rubs noted.  ABD:  Soft, nontender and nondistended. Normal bowel sounds. No guarding  EXT:  Moves all extremities well. No cyanosis. No redness. No edema.   Skin: Dry, intact, no bleeding    Results Review:        Results from last 7 days   Lab Units 02/01/21  0506 01/31/21  0525 01/30/21  0731   SODIUM mmol/L 139 141 138   POTASSIUM mmol/L 3.6 3.8 5.5*   CHLORIDE mmol/L 107 108* 102   CO2 mmol/L 19.0* 20.0* 19.7*   BUN mg/dL 51* 75* 101*   CREATININE mg/dL 1.36* 1.56* 2.03*   CALCIUM mg/dL 8.6 8.7 9.0   AST (SGOT) U/L  --  29 38   ALT (SGPT) U/L  --  5 7   ANION GAP mmol/L 13.0 13.0 16.3*   ALBUMIN g/dL  --  3.00* 3.30*     Results from last 7 days   Lab Units 01/31/21  0525 01/30/21  0731   TROPONIN T ng/mL 0.011 0.026         Results from last 7 days   Lab Units 01/31/21  0525 01/30/21  0731   PROBNP pg/mL 5,967.0* 4,595.0*     Results from last 7 days   Lab Units 02/01/21  0803 01/31/21  2032 01/31/21  0525 01/30/21  2312 01/30/21  1846 01/30/21  1328 01/30/21  0731 01/26/21  1156   WBC 10*3/mm3 9.42 8.75 11.81* 10.58 10.56 13.03* 14.81* 8.03   HEMOGLOBIN g/dL 8.8* 8.9* 8.7* 8.6* 8.3* 8.6* 8.5* 13.2   HEMATOCRIT % 26.5* 25.6* 25.8* " 24.9* 24.7* 26.4* 25.2* 39.4   PLATELETS 10*3/mm3 185 176 190 191 187 220 246 179   MCV fL 100.8* 99.6* 101.6* 100.4* 102.5* 101.5* 101.2* 100.0*   NEUTROPHIL % %  --   --  68.4  --   --   --  71.3 66.2   LYMPHOCYTE % %  --   --  16.3*  --   --   --  15.7* 19.6   MONOCYTES % %  --   --  9.9  --   --   --  8.8 10.0   EOSINOPHIL % %  --   --  3.6  --   --   --  2.3 3.2   BASOPHIL % %  --   --  0.4  --   --   --  0.5 0.6   IMM GRAN % %  --   --  1.4*  --   --   --  1.4* 0.4     Results from last 7 days   Lab Units 01/31/21  0525 01/30/21  2312 01/30/21  0905 01/30/21  0732 01/26/21  1319   INR  1.18* 1.30* >10.00* >10.00* >10.00*   APTT seconds  --   --   --  59.6* 67.6*     Results from last 7 days   Lab Units 01/31/21  0525 01/30/21  0731   MAGNESIUM mg/dL 2.0 2.1           Invalid input(s): LDLCALC      Results from last 7 days   Lab Units 02/01/21  0803   HEMOGLOBIN A1C % 6.20*     Glucose   Date/Time Value Ref Range Status   02/01/2021 0504 123 70 - 130 mg/dL Final   02/01/2021 0011 139 (H) 70 - 130 mg/dL Final   01/31/2021 1728 157 (H) 70 - 130 mg/dL Final   01/31/2021 1135 161 (H) 70 - 130 mg/dL Final   01/31/2021 0524 129 70 - 130 mg/dL Final   01/30/2021 2312 133 (H) 70 - 130 mg/dL Final   01/30/2021 1848 277 (H) 70 - 130 mg/dL Final   01/30/2021 1110 159 (H) 70 - 130 mg/dL Final             Results from last 7 days   Lab Units 01/30/21  0732   NITRITE UA  Negative     Results from last 7 days   Lab Units 01/30/21  0831   COVID19  Not Detected     Results from last 7 days   Lab Units 01/31/21  0525   URIC ACID mg/dL 6.3           Imaging:   Imaging Results (All)     Procedure Component Value Units Date/Time    XR Chest 1 View [[id number]] Collected: 01/30/21 0715     Updated: 01/30/21 0720    Narrative:      XR CHEST 1 VW-     HISTORY: Male who is 80 years-old,  weakness     TECHNIQUE: Frontal view of the chest     COMPARISON: 07/02/2020     FINDINGS: Heart size is normal. Aorta is calcified.  Pulmonary  vasculature is unremarkable. No focal pulmonary consolidation, pleural  effusion, or pneumothorax. No acute osseous process.       Impression:      No evidence for acute pulmonary process. Follow-up as  clinical indications persist.     This report was finalized on 1/30/2021 7:17 AM by Dr. Mason Henning M.D.             I reviewed the patient's new clinical results.  I personally viewed and interpreted the patient's imaging results:        Medication Review:   allopurinol, 100 mg, Oral, Daily  amantadine, 100 mg, Oral, Q12H  budesonide-formoterol, 2 puff, Inhalation, BID - RT  carbidopa-levodopa, 1 tablet, Oral, TID  ferrous sulfate, 325 mg, Oral, Daily With Breakfast  insulin regular, 0-9 Units, Subcutaneous, Q6H  metoprolol tartrate, 25 mg, Oral, Q12H  montelukast, 10 mg, Oral, Nightly  sodium chloride, 10 mL, Intravenous, Q12H        lactated ringers, 50 mL/hr, Last Rate: 50 mL/hr (02/01/21 1014)        ASSESSMENT:   1. Coumadin toxicity, reversed  2. Acute upper GI bleed, no sign of active bleeding  3. Acute blood loss anemia  4. Excessive alcohol abuse  5. Chronic atrial fibrillation on chronic anticoagulation  6. Diabetes mellitus  7. Essential hypertension  8. Parkinson's disease  9. Acute renal failure on chronic kidney disease, patient is back to baseline creatinine was down to 1.5 today  10. Mild pulmonary hypertension    PLAN:  Discussed with nursing staff, there is concern about postprandial coughing and the patient will have speech therapy evaluation  Patient is to be switched to Eliquis per cardiology to minimize any more problem with Coumadin toxicity, will send the orders to the pharmacy so that we can have some idea about the cost and patient will be given information for medication assistance regarding the cost  Patient is cleared to transfer out of the intensive care unit  We will consult ENT to further evaluate for the epistaxis and see if any further recommendation can be  provided to reduce risk of having another attack, we will also keep the patient off the aspirin until cleared otherwise by ENT, will try to hold on the Eliquis until ENT evaluate, no Protonix needed and it was discontinued  We will discontinue the IV fluid as his p.o. intake gets better  We will transfer to a stepdown unit        Disposition: Plan to go home    Marcelo Rdz MD  02/01/21  12:31 EST          Dictated utilizing Dragon dictation

## 2021-02-01 NOTE — PLAN OF CARE
"Goal Outcome Evaluation:  Plan of Care Reviewed With: patient     Outcome Summary: Clinical swallow eval completed. Pt had coughing prior to eval. Pt tolerated thins via cup and straw. Mastication was functional for soft, mixed, and reglar trials. No coughing noted after trials, however, at end of eval during discussion, significant coughing began. Pt stated this is normal for him and he always gets a \"little tickle\" in his throat. Question pharyngeal residual or esophageal dysfunction. Recommend continue regular diet with thin. ST will complete VFSS 2/2.  "

## 2021-02-01 NOTE — PROGRESS NOTES
"    Patient Name: Dustin Villeda  :1940  80 y.o.      Patient Care Team:  Mal Barboza MD as PCP - General (Internal Medicine)    Chief Complaint:   AF, epistaxis  Interval History:    no lesions on EGD. HR 120s off metoprolol. BP stable.     Objective   Vital Signs  Temp:  [97.5 °F (36.4 °C)-98.1 °F (36.7 °C)] 97.5 °F (36.4 °C)  Heart Rate:  [108-133] 114  Resp:  [17-22] 18  BP: (104-140)/(62-96) 132/84    Intake/Output Summary (Last 24 hours) at 2021 0815  Last data filed at 2021 0600  Gross per 24 hour   Intake 2800 ml   Output 1200 ml   Net 1600 ml     Flowsheet Rows      First Filed Value   Admission Height  172.7 cm (68\") Documented at 2021 06   Admission Weight  72.1 kg (159 lb) Documented at 2021 0605          Physical Exam:   General Appearance:    Alert, cooperative, in no acute distress, pale   Lungs:     Clear to auscultation.  Normal respiratory effort and rate.      Heart:  Irregular, tachy   Chest Wall:    No abnormalities observed   Abdomen:     Soft, nontender, positive bowel sounds.     Extremities:   no cyanosis, clubbing or edema.  No marked joint deformities.  Adequate musculoskeletal strength.       Results Review:    Results from last 7 days   Lab Units 21  0506   SODIUM mmol/L 139   POTASSIUM mmol/L 3.6   CHLORIDE mmol/L 107   CO2 mmol/L 19.0*   BUN mg/dL 51*   CREATININE mg/dL 1.36*   GLUCOSE mg/dL 126*   CALCIUM mg/dL 8.6     Results from last 7 days   Lab Units 21  0525 21  0731   TROPONIN T ng/mL 0.011 0.026     Results from last 7 days   Lab Units 21  2032   WBC 10*3/mm3 8.75   HEMOGLOBIN g/dL 8.9*   HEMATOCRIT % 25.6*   PLATELETS 10*3/mm3 176     Results from last 7 days   Lab Units 21  0525 21  2312 21  0905 21  0732 21  1319   INR  1.18* 1.30* >10.00* >10.00* >10.00*   APTT seconds  --   --   --  59.6* 67.6*     Results from last 7 days   Lab Units 21  0525   MAGNESIUM mg/dL 2.0             "         Medication Review:   allopurinol, 100 mg, Oral, Daily  amantadine, 100 mg, Oral, Q12H  budesonide-formoterol, 2 puff, Inhalation, BID - RT  carbidopa-levodopa, 1 tablet, Oral, TID  insulin regular, 0-9 Units, Subcutaneous, Q6H  metoprolol tartrate, 25 mg, Oral, Q12H  montelukast, 10 mg, Oral, Nightly  sodium chloride, 10 mL, Intravenous, Q12H         lactated ringers, 50 mL/hr, Last Rate: 50 mL/hr (01/31/21 1141)        Assessment/Plan   1. AF with RVR  2. Epistaxis  3. supratherapeutic INR  4. Parkinsons disease/dementia    Start metoprolol 25 BID, home dose  I would suggest Eliquis for this gentleman due to noncompliance with dietary restrictions on coumadin and noncompliance with INR checks. Cost may be an issue. He prefers to make this decision with Dr. cShwarz.     OK to tx to floor.   Thank you for including me in the care of this patient. Will continue to follow. Please call with any further questions or concerns.     Discussed with Dr. Schwarz.    Zoila Montoya MD  [facility]  02/01/21  08:15 EST

## 2021-02-01 NOTE — THERAPY EVALUATION
Acute Care - Speech Language Pathology   Swallow Initial Evaluation [facility]     Patient Name: Dustin Villeda  : 1940  MRN: [id number]  Today's Date: 2021               Admit Date: 2021    Visit Dx:     ICD-10-CM ICD-9-CM   1. Poisoning by warfarin sodium, accidental or unintentional, initial encounter  T45.511A 964.2     E858.2   2. Anemia, unspecified type  D64.9 285.9   3. Gastrointestinal hemorrhage, unspecified gastrointestinal hemorrhage type  K92.2 578.9   4. AARON (acute kidney injury) (CMS/HCC)  N17.9 584.9     Patient Active Problem List   Diagnosis   • Closed right ankle fracture   • Poisoning by warfarin sodium   • Anemia   • Type 2 diabetes mellitus (CMS/HCC)   • Hypertension   • Hyperlipidemia   • Gout   • Parkinson disease (CMS/HCC)   • Anxiety   • Atrial fibrillation (CMS/HCC)   • 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)   • Poor compliance with medication   • Acute kidney injury superimposed on chronic kidney disease (CMS/HCC)   • Acute gastrointestinal bleeding   • Nosebleed (Resolved)   • Melena     Past Medical History:   Diagnosis Date   • Ankle fracture     RIGHT ANKLE    • Anxiety     ABOUT CURRENT HEALTH SITUATION    • Atrial fibrillation (CMS/HCC)    • Constipation due to opioid therapy     NO BOWEL MOVEMENT IN 5 DAYS   • Coronary artery disease    • Diabetes mellitus (CMS/HCC)    • Gout     BIG TOES   • Hyperlipidemia    • Hypertension    • Parkinson disease (CMS/HCC)      Past Surgical History:   Procedure Laterality Date   • ANKLE OPEN REDUCTION INTERNAL FIXATION Right 2019    Procedure: OPEN REDUCTION INTERNAL FIXATION RIGHT ANKLE;  Surgeon: Evgeny Keith II, MD;  Location: [facility];  Service: Orthopedics   • CATARACT EXTRACTION EXTRACAPSULAR W/ INTRAOCULAR LENS IMPLANTATION Bilateral    • COLONOSCOPY     • CORONARY ANGIOPLASTY WITH STENT PLACEMENT     • ENDOSCOPY  2021    Procedure:  ESOPHAGOGASTRODUODENOSCOPY AT BEDSIDE;  Surgeon: Geoff Rosenthal MD;  Location: [facility] ENDOSCOPY;  Service: Gastroenterology;;  PRE OP - BLACK STOOLS  POST OP - CANDIDA   • EXTRACORPOREAL SHOCK WAVE LITHOTRIPSY (ESWL)      OVER  10 YEARS AGO   • PILONIDAL CYSTECTOMY  1964     Patient was not wearing a face mask during this therapy encounter. Therapist used appropriate personal protective equipment including mask, eye protection and gloves.  Mask used was standard procedure mask. Appropriate PPE was worn during the entire therapy session. Hand hygiene was completed before and after therapy session. Patient is not in enhanced droplet precautions.        SWALLOW EVALUATION (last 72 hours)      SLP Adult Swallow Evaluation     Row Name 02/01/21 1300                   Rehab Evaluation    Document Type  evaluation  -AW        Subjective Information  no complaints  -AW        Patient Observations  alert;cooperative;agree to therapy  -AW        Care Plan Review  evaluation/treatment results reviewed;patient/other agree to care plan  -AW        Patient Effort  good  -AW        Symptoms Noted During/After Treatment  none  -AW           General Information    Patient Profile Reviewed  yes  -AW        Pertinent History Of Current Problem  Pt admitted with weakness, GI bleed, coumadin toxicity; h/o chronic A fib, alcohol abuse, recent diagnosis of Parkinson's Disease.  -AW        Current Method of Nutrition  regular textures;thin liquids  -AW        Precautions/Limitations, Vision  WFL;for purposes of eval  -AW        Precautions/Limitations, Hearing  WFL;for purposes of eval  -AW        Prior Level of Function-Communication  WFL  -AW        Prior Level of Function-Swallowing  no diet consistency restrictions  -AW        Plans/Goals Discussed with  patient;agreed upon  -AW        Barriers to Rehab  none identified  -AW        Patient's Goals for Discharge  return home  -AW           Pain    Additional Documentation  Pain Scale:  "Numbers Pre/Post-Treatment (Group)  -AW           Pain Scale: Numbers Pre/Post-Treatment    Pretreatment Pain Rating  0/10 - no pain  -AW        Posttreatment Pain Rating  0/10 - no pain  -AW           Oral Motor Structure and Function    Dentition Assessment  upper dentures/partial in place  -AW        Secretion Management  WNL/WFL  -AW        Mucosal Quality  moist, healthy  -AW        Volitional Swallow  delayed  -AW           Oral Musculature and Cranial Nerve Assessment    Oral Motor General Assessment  WFL  -AW           General Eating/Swallowing Observations    Respiratory Support Currently in Use  room air  -AW        Eating/Swallowing Skills  self-fed;fed by SLP  -AW        Positioning During Eating  upright in bed  -AW        Utensils Used  spoon;cup;straw  -AW        Consistencies Trialed  regular textures;soft textures;mixed consistency;pureed;thin liquids  -AW        Pre SpO2 (%)  98  -AW        Post SpO2 (%)  100  -AW           Clinical Swallow Eval    Pharyngeal Phase  suspected pharyngeal impairment  -AW        Esophageal Phase  suspected esophageal impairment  -AW        Clinical Swallow Evaluation Summary  Clinical swallow eval completed. Pt had coughing prior to eval. Pt tolerated thins via cup and straw. Mastication was functional for soft, mixed, and reglar trials. No coughing noted after trials, however, at end of eval during discussion, significant coughing began. Pt stated this is normal for him and he always gets a \"little tickle\" in his throat. Question pharyngeal residual or esophageal dysfunction. Recommend continue regular diet with thin. ST will complete VFSS 2/2.    -AW           Clinical Impression    SLP Swallowing Diagnosis  suspected pharyngeal dysphagia;esophageal dysphagia  -AW        Functional Impact  risk of aspiration/pneumonia  -AW        Rehab Potential/Prognosis, Swallowing  good, to achieve stated therapy goals  -AW        Swallow Criteria for Skilled Therapeutic " Interventions Met  demonstrates skilled criteria  -AW           Recommendations    Therapy Frequency (Swallow)  PRN  -AW        Predicted Duration Therapy Intervention (Days)  until discharge  -AW        SLP Diet Recommendation  regular textures;thin liquids  -AW        Recommended Diagnostics  VFSS (MBS)  -AW        Recommended Precautions and Strategies  upright posture during/after eating;small bites of food and sips of liquid  -AW        Oral Care Recommendations  Oral Care BID/PRN  -AW        SLP Rec. for Method of Medication Administration  meds whole;with thin liquids;with pudding or applesauce;as tolerated  -AW        Monitor for Signs of Aspiration  yes;notify SLP if any concerns  -AW        Anticipated Discharge Disposition (SLP)  unknown  -AW           Swallow Goals (SLP)    Oral Nutrition/Hydration Goal Selection (SLP)  oral nutrition/hydration, SLP goal 1  -AW           Oral Nutrition/Hydration Goal 1 (SLP)    Oral Nutrition/Hydration Goal 1, SLP  Pt will safely tolerate the least restrictive diet with no s/s of aspiration.  -AW        Time Frame (Oral Nutrition/Hydration Goal 1, SLP)  by discharge  -AW          User Key  (r) = Recorded By, (t) = Taken By, (c) = Cosigned By    Initials Name Effective Dates    Pinky Menezes, MS CCC-SLP 06/08/18 -           EDUCATION  The patient has been educated in the following areas:   Dysphagia (Swallowing Impairment) Oral Care/Hydration.    SLP Recommendation and Plan  SLP Swallowing Diagnosis: suspected pharyngeal dysphagia, esophageal dysphagia  SLP Diet Recommendation: regular textures, thin liquids  Recommended Precautions and Strategies: upright posture during/after eating, small bites of food and sips of liquid  SLP Rec. for Method of Medication Administration: meds whole, with thin liquids, with pudding or applesauce, as tolerated     Monitor for Signs of Aspiration: yes, notify SLP if any concerns  Recommended Diagnostics: VFSS (MBS)  Swallow Criteria for  "Skilled Therapeutic Interventions Met: demonstrates skilled criteria  Anticipated Discharge Disposition (SLP): unknown  Rehab Potential/Prognosis, Swallowing: good, to achieve stated therapy goals  Therapy Frequency (Swallow): PRN  Predicted Duration Therapy Intervention (Days): until discharge                         Plan of Care Reviewed With: patient  Outcome Summary: Clinical swallow eval completed. Pt had coughing prior to eval. Pt tolerated thins via cup and straw. Mastication was functional for soft, mixed, and reglar trials. No coughing noted after trials, however, at end of eval during discussion, significant coughing began. Pt stated this is normal for him and he always gets a \"little tickle\" in his throat. Question pharyngeal residual or esophageal dysfunction. Recommend continue regular diet with thin. ST will complete VFSS 2/2.    SLP GOALS     Row Name 02/01/21 1300             Oral Nutrition/Hydration Goal 1 (SLP)    Oral Nutrition/Hydration Goal 1, SLP  Pt will safely tolerate the least restrictive diet with no s/s of aspiration.  -AW      Time Frame (Oral Nutrition/Hydration Goal 1, SLP)  by discharge  -AW        User Key  (r) = Recorded By, (t) = Taken By, (c) = Cosigned By    Initials Name Provider Type    Pinky Menezes MS CCC-SLP Speech and Language Pathologist           SLP Outcome Measures (last 72 hours)      SLP Outcome Measures     Row Name 02/01/21 1300             SLP Outcome Measures    Outcome Measure Used?  Adult NOMS  -AW         Adult FCM Scores    FCM Chosen  Swallowing  -AW      Swallowing FCM Score  6  -AW        User Key  (r) = Recorded By, (t) = Taken By, (c) = Cosigned By    Initials Name Effective Dates    Pinky Menezes MS CCC-SLP 06/08/18 -            Time Calculation:   Time Calculation- SLP     Row Name 02/01/21 1320             Time Calculation- SLP    SLP Start Time  0830  -AW      SLP Received On  02/01/21  -AW        User Key  (r) = Recorded By, (t) = Taken By, (c) = " Cosigned By    Initials Name Provider Type    AW Pinky Arambula, MS CCC-SLP Speech and Language Pathologist          Therapy Charges for Today     Code Description Service Date Service Provider Modifiers Qty    [id number] HC ST EVAL ORAL PHARYNG SWALLOW 4 2/1/2021 Pinky Arambula, MS CCC-SLP GN 1               Pinky Arambula MS CCC-SLP  2/1/2021

## 2021-02-02 NOTE — NURSING NOTE
Pt VSS, standing at side of bed to urinate and using BSC with no issues, 1 BM overnight (dark), no complaints of pain, CBC collected at beginning of shift showed hgb of 9.4    Tele overflow, wife updated-plans to visit today, CTM

## 2021-02-02 NOTE — PROGRESS NOTES
[facility] Follow Up    Chief Complaint: follow up atrial fibrillation    Interval History:  Heart rate in 120s.  Denies any chest pain, dyspnea or palpitations.  Complains of upper airway congestion and cough which is a chronic issue for him.  Denies any bleeding.     Objective:     Objective:  Temp:  [97.8 °F (36.6 °C)-98.9 °F (37.2 °C)] 98.7 °F (37.1 °C)  Heart Rate:  [102-142] 124  Resp:  [20-22] 20  BP: (109-143)/() 124/80     Intake/Output Summary (Last 24 hours) at 2/2/2021 0809  Last data filed at 2/2/2021 0625  Gross per 24 hour   Intake 480 ml   Output 500 ml   Net -20 ml     Body mass index is 24.34 kg/m².      01/31/21  0500 02/01/21  0500 02/02/21  0506   Weight: 71.2 kg (156 lb 15.5 oz) 72.4 kg (159 lb 9.8 oz) 72.6 kg (160 lb 0.9 oz)     Weight change: 0.2 kg (7.1 oz)      Physical Exam:   General : Alert, cooperative, in no acute distress.  Neuro: Alert,cooperative and oriented.  Lungs: CTAB. Normal respiratory effort and rate.  CV: irregularly, irregular, normal S1 and S2, no murmurs, gallops or rubs.  ABD: Soft, nontender, nondistended. Positive bowel sounds.  Extr: No edema or cyanosis, moves all extremities.    Lab Review:   Results from last 7 days   Lab Units 02/01/21  0506 01/31/21  0525 01/30/21  0731   SODIUM mmol/L 139 141 138   POTASSIUM mmol/L 3.6 3.8 5.5*   CHLORIDE mmol/L 107 108* 102   CO2 mmol/L 19.0* 20.0* 19.7*   BUN mg/dL 51* 75* 101*   CREATININE mg/dL 1.36* 1.56* 2.03*   GLUCOSE mg/dL 126* 123* 171*   CALCIUM mg/dL 8.6 8.7 9.0   AST (SGOT) U/L  --  29 38   ALT (SGPT) U/L  --  5 7     Results from last 7 days   Lab Units 01/31/21  0525 01/30/21  0731   TROPONIN T ng/mL 0.011 0.026     Results from last 7 days   Lab Units 02/01/21  2044 02/01/21  0803   WBC 10*3/mm3 9.02 9.42   HEMOGLOBIN g/dL 9.4* 8.8*   HEMATOCRIT % 29.1* 26.5*   PLATELETS 10*3/mm3 179 185     Results from last 7 days   Lab Units 01/31/21  0525 01/30/21  2312  01/30/21  0732  01/26/21  1319   INR  1.18* 1.30*   < > >10.00* >10.00*   APTT seconds  --   --   --  59.6* 67.6*    < > = values in this interval not displayed.     Results from last 7 days   Lab Units 01/31/21  0525 01/30/21  0731   MAGNESIUM mg/dL 2.0 2.1           Invalid input(s): LDLCALC  Results from last 7 days   Lab Units 01/31/21  0525 01/30/21  0731   PROBNP pg/mL 5,967.0* 4,595.0*         I reviewed the patient's new clinical results.  I personally viewed and interpreted the patient's EKG  Current Medications:   Scheduled Meds:allopurinol, 100 mg, Oral, Daily  amantadine, 100 mg, Oral, Q12H  budesonide-formoterol, 2 puff, Inhalation, BID - RT  carbidopa-levodopa, 1 tablet, Oral, TID  cetirizine, 10 mg, Oral, Daily  ferrous sulfate, 325 mg, Oral, Daily With Breakfast  insulin regular, 0-9 Units, Subcutaneous, Q6H  metoprolol tartrate, 25 mg, Oral, Q12H  montelukast, 10 mg, Oral, Nightly  pioglitazone, 15 mg, Oral, Daily  pravastatin, 40 mg, Oral, Nightly  sodium chloride, 10 mL, Intravenous, Q12H      Continuous Infusions:lactated ringers, 50 mL/hr, Last Rate: Stopped (02/01/21 1423)        Allergies:  No Known Allergies    Assessment/Plan:     1. Chronic atrial fibrillation. Fair rate control on metoprolol.  Previously on warfarin but issues with diet and INR check compliance.  Considering apixaban if cost not an issue.  2. Epistaxis. GI work up unremarkable.  ENT to see.  3. Acute anemia  4. Supratherpeutic INR  5. Coronary artery disease.  History of stent.   6. CKD  7. Hypertension  8. Parkinson's disease    - Continue metoprolol  - Eventually resume warfarin versus initiating apixaban per Dr. Barboza.        Shahla Medrano MD  02/02/21  08:09 EST

## 2021-02-02 NOTE — PLAN OF CARE
Goal Outcome Evaluation:  Plan of Care Reviewed With: patient     Outcome Summary: VFSS completed. Pt had a slight swallow delay and inconsistent trace transient penetration with thins. No aspiration was observed. Pt had a prominent cricopharyngeus (slight) with an unremarkable esophageal scan. Recommend pt continue on regular and thin; meds as tolerated; upright for meals and 30 min after; slow rate; small bites/sips. During the study, question soft tissue changes noted in the pyriforms, ENT consult pending due to chronic cough and nosebleeds. ST to follow for diet tolerance

## 2021-02-02 NOTE — PROGRESS NOTES
Continued Stay Note  [facility]     Patient Name: Dustin Villeda  MRN: [id number]  Today's Date: 2/2/2021    Admit Date: 1/30/2021    Discharge Plan     Row Name 02/02/21 1514       Plan    Plan  Home    Plan Comments  [facility] spoke to pt wife at bedside.  She states that she obtained a price from [employer] for 3 months supply  She states that she is agreeable to pay that for the medication if that is what is best  .   retail pharmacy delivered 1st fill for zero dollar co pay  Pt will have time to obtain medication from [facility]        Discharge Codes    No documentation.             Ramila Heath RN

## 2021-02-02 NOTE — PLAN OF CARE
Problem: Fall Injury Risk  Goal: Absence of Fall and Fall-Related Injury  Outcome: Ongoing, Progressing  Intervention: Identify and Manage Contributors to Fall Injury Risk  Recent Flowsheet Documentation  Taken 2/2/2021 1542 by Jaycee Wright RN  Medication Review/Management: medications reviewed  Intervention: Promote Injury-Free Environment  Recent Flowsheet Documentation  Taken 2/2/2021 1542 by Jaycee Wright RN  Safety Promotion/Fall Prevention:   activity supervised   assistive device/personal items within reach   clutter free environment maintained   gait belt   nonskid shoes/slippers when out of bed   safety round/check completed     Problem: Adult Inpatient Plan of Care  Goal: Plan of Care Review  Outcome: Ongoing, Progressing  Flowsheets  Taken 2/2/2021 1848 by Jaycee Wright RN  Plan of Care Reviewed With: patient  Outcome Summary: VSS, Controlled A-fib, no falls, pt denies pain, pt has a slight non-productive cough which he states is normal. Pt tolorated meds with sips of water well. Pt has no complaints at this time except for feeling a little anxious. Will continue to monitor.  Taken 1/31/2021 0622 by Alexandro Santos RN  Progress: improving  Goal: Patient-Specific Goal (Individualized)  Outcome: Ongoing, Progressing  Goal: Absence of Hospital-Acquired Illness or Injury  Outcome: Ongoing, Progressing  Intervention: Identify and Manage Fall Risk  Recent Flowsheet Documentation  Taken 2/2/2021 1542 by Jaycee Wright RN  Safety Promotion/Fall Prevention:   activity supervised   assistive device/personal items within reach   clutter free environment maintained   gait belt   nonskid shoes/slippers when out of bed   safety round/check completed  Intervention: Prevent Skin Injury  Recent Flowsheet Documentation  Taken 2/2/2021 1542 by Jaycee Wright RN  Body Position: sitting up in bed  Intervention: Prevent and Manage VTE (venous thromboembolism) Risk  Recent Flowsheet Documentation  Taken 2/2/2021 1542 by  Wright, Jaycee, RN  VTE Prevention/Management: dorsiflexion/plantar flexion performed  Intervention: Prevent Infection  Recent Flowsheet Documentation  Taken 2/2/2021 1542 by Jaycee Wright RN  Infection Prevention:   visitors restricted/screened   personal protective equipment utilized  Goal: Optimal Comfort and Wellbeing  Outcome: Ongoing, Progressing  Goal: Readiness for Transition of Care  Outcome: Ongoing, Progressing     Problem: Skin Injury Risk Increased  Goal: Skin Health and Integrity  Outcome: Ongoing, Progressing  Intervention: Optimize Skin Protection  Recent Flowsheet Documentation  Taken 2/2/2021 1542 by Jaycee Wright RN  Pressure Reduction Techniques: frequent weight shift encouraged  Head of Bed (HOB): HOB at 20-30 degrees   Goal Outcome Evaluation:  Plan of Care Reviewed With: patient     Outcome Summary: VSS, Controlled A-fib, no falls, pt denies pain, pt has a slight non-productive cough which he states is normal. Pt tolorated meds with sips of water well. Pt has no complaints at this time except for feeling a little anxious. Will continue to monitor.

## 2021-02-02 NOTE — PROGRESS NOTES
LOS: 3 days   Patient Care Team:  Mal Barboza MD as PCP - General  Mal Barboza MD as PCP - Family Medicine    Chief Complaint:   Chief Complaint   Patient presents with   • Weakness - Generalized   • Fatigue             Today the patient is awake, alert, and seems to be feeling a little better each day.  He seems to be eating and drinking relatively well.  Apparently he is sleeping very poorly and would like something to help with that.  Otherwise he seems to be relatively stable and doing well.  I talked with both his wife and the  about medications and hopefully will have a answer on the cost of Eliquis today.  Otherwise he seems to be tolerating his medications well.  And ENT consult is pending for today as well.  At this point he does not seem to be bleeding.  He has a history of a chronic cough and that flares up from time to time.  He does seem to cough after eating and so a swallow study is pending for that as well.    Interval History:     Patient Complaints: Fatigue and tiredness.  Poor sleep.  Patient Denies: He denies shortness of breath or chest pain.  No abdominal pain.  History taken from: patient chart RN    Review of Systems:    Overall his review of systems revolves primarily around his complaints of insomnia.  As noted he is eating and drinking well.  His epistaxis seems to have stopped.  He is not complaining of shortness of breath or chest pain.  No abdominal pain.  He does have this cough that has had for some time.  He has been worked up and evaluated by his allergist who has him on multiple medications that generally seem to work.  As noted he has a ENT evaluation and swallow study pending for today.          Vital Signs  Temp:  [98 °F (36.7 °C)-99.2 °F (37.3 °C)] 98.9 °F (37.2 °C)  Pulse:  [] 109  Resp:  [18] 18  BP: (111-154)/(55-97) 154/97    I/O'S  I/O last 3 completed shifts:  In: 2055 [P.O.:1490; I.V.:565]  Out: 1255 [Urine:1255]  No intake/output data  recorded.    Physical Exam:   General Appearance alert, pleasant, appears stated age, cooperative and he looks tired and fatigued.  He is in no distress at this point.  Lungs clear to auscultation, respirations regular, respirations even and respirations unlabored  Heart tachycardia and still in atrial fibrillation with a rate generally over 100.  Abdomen normal bowel sounds, no masses, no hepatomegaly, no splenomegaly, soft non-tender, no guarding and no rebound tenderness  Extremities moves extremities well, no edema, no cyanosis and no redness     Results Review:     I reviewed the patient's new clinical results.  I reviewed the patient's other test results and agree with the interpretation                     Results from last 7 days   Lab Units 02/01/21  0506   SODIUM mmol/L 139   POTASSIUM mmol/L 3.6   CHLORIDE mmol/L 107   CO2 mmol/L 19.0*   BUN mg/dL 51*   CREATININE mg/dL 1.36*   GLUCOSE mg/dL 126*   CALCIUM mg/dL 8.6     Results from last 7 days   Lab Units 01/31/21  0525 01/30/21  0731   TROPONIN T ng/mL 0.011 0.026     Results from last 7 days   Lab Units 02/01/21  2044   WBC 10*3/mm3 9.02   HEMOGLOBIN g/dL 9.4*   HEMATOCRIT % 29.1*   PLATELETS 10*3/mm3 179     Results from last 7 days   Lab Units 01/31/21  0525 01/30/21  2312 01/30/21  0905 01/30/21  0732 01/26/21  1319   INR  1.18* 1.30* >10.00* >10.00* >10.00*   APTT seconds  --   --   --  59.6* 67.6*     Results from last 7 days   Lab Units 01/31/21  0525   MAGNESIUM mg/dL 2.0         Results from last 7 days   Lab Units 01/31/21  0525   PROBNP pg/mL 5,967.0*             Medication Review:   Scheduled Meds:allopurinol, 100 mg, Oral, Daily  amantadine, 100 mg, Oral, Q12H  budesonide-formoterol, 2 puff, Inhalation, BID - RT  carbidopa-levodopa, 1 tablet, Oral, TID  cetirizine, 10 mg, Oral, Daily  ferrous sulfate, 325 mg, Oral, Daily With Breakfast  insulin regular, 0-9 Units, Subcutaneous, Q6H  metoprolol tartrate, 25 mg, Oral, Q12H  montelukast, 10  mg, Oral, Nightly  pioglitazone, 15 mg, Oral, Daily  pravastatin, 40 mg, Oral, Nightly  sodium chloride, 10 mL, Intravenous, Q12H      Continuous Infusions:lactated ringers, 50 mL/hr, Last Rate: Stopped (02/01/21 1423)      PRN Meds:.•  acetaminophen **OR** acetaminophen  •  dextrose  •  dextrose  •  glucagon (human recombinant)  •  ondansetron **OR** ondansetron  •  sodium chloride  •  sodium chloride              Patient Active Problem List   Diagnosis   • Closed right ankle fracture   • Poisoning by warfarin sodium   • Anemia   • Type 2 diabetes mellitus (CMS/HCC)   • Hypertension   • Hyperlipidemia   • Gout   • Parkinson disease (CMS/HCC)   • Anxiety   • Atrial fibrillation (CMS/HCC)   • 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)   • Poor compliance with medication   • Acute kidney injury superimposed on chronic kidney disease (CMS/HCC)   • Acute gastrointestinal bleeding   • Nosebleed (Resolved)   • Melena       #1 Epistaxis/Anemia-at this point his bleeding seems to have stopped and were waiting for an ENT evaluation today.  His H&H remained stable and he has no new complaints.  His CBC for today is still pending.    2.  Coumadin toxicity-his INR is been reversed but since his GI work-up was negative he needs to be restarted on some form of anticoagulation.  Eliquis or Coumadin are the current choices.  He will be put on Coumadin if the Eliquis is too expensive.    3.  Atrial fibrillation-he is in chronic atrial fibrillation and on metoprolol.  Cardiology is following the patient.  His rate is reasonably stable but is consistently over 100.  He has no complaints of rapid heart rates or palpitations.  For the most part he seems to be relatively stable.    4.  ASCVD-no complaints of chest pain or other cardiac symptoms at this point.    5.  Parkinson's disease-he is on Sinemet and seems to be relatively stable.  I think the reason his Parkinson symptoms seemed worse  was because of his alcohol intake.  He clearly seems much more awake alert and oriented now that he has not been drinking for several days.    6.  CKD-his GFR is stable    7.  Diabetes mellitus type 2-now that he is eating again his sugars are drifting up.  I will put him back on his Actos.  His hemoglobin A1c is came back at 6.2    8.  Hypertension his blood pressures well controlled    9.  Gout-no complaints on medications number    10.  Anxiety-he has some degree of anxiety and that may be affecting his sleep at night.  If he has trouble sleeping tonight he can use some low-dose Xanax which will cover both his anxiety and his insomnia.                  Plan for disposition:Where: home    Mal Barboza MD  02/02/21  07:59 EST          Time:

## 2021-02-02 NOTE — PROGRESS NOTES
"  PROGRESS NOTE  Patient Name: Dustin Villeda  Age/Sex: 80 y.o. male  : 1940  MRN: [id number]    Date of Admission: 2021  Date of Encounter Visit: 21   LOS: 3 days   Patient Care Team:  Mal Barboza MD as PCP - General (Internal Medicine)    Chief Complaint: Came in with acute epistaxis, Coumadin toxicity, chronic atrial fibrillation    Hospital course: Patient was admitted on 2021, received Kcentra and his Coumadin toxicity was reversed, he had total of 2 units of red blood cells.  EGD was done and showed no evidence of any source of GI bleeding and is felt to be all coming from epistaxis.  Patient has no sign of any active bleeding at this point, he has been off the anticoagulation and off the aspirin since admission.  Hemodynamically stable, tolerating p.o. however per nursing staff he was having some postprandial coughing which was evaluated by speech therapy and was cleared on a regular diet     REVIEW OF SYSTEMS:   CONSTITUTIONAL: no fever or chills  CARDIOVASCULAR: No chest pain, chest pressure or chest discomfort. No palpitations or edema.  Patient is in chronic A. fib  RESPIRATORY: No shortness of breath, cough or sputum.   GASTROINTESTINAL: No anorexia, nausea, vomiting or diarrhea. No abdominal pain no evidence of any ongoing GI bleed clinically.   HEMATOLOGIC: No more melena      Ventilator/Non-Invasive Ventilation Settings (From admission, onward)     on room air            Vital Signs  Temp:  [97.8 °F (36.6 °C)-98.9 °F (37.2 °C)] 98.7 °F (37.1 °C)  Heart Rate:  [102-137] 137  Resp:  [20-22] 22  BP: (109-143)/() 131/80  SpO2:  [80 %-100 %] 95 %  on    Device (Oxygen Therapy): room air    Intake/Output Summary (Last 24 hours) at 2021 0920  Last data filed at 2021 0800  Gross per 24 hour   Intake 720 ml   Output 500 ml   Net 220 ml     Flowsheet Rows      First Filed Value   Admission Height  172.7 cm (68\") Documented at 2021 0605   Admission Weight  72.1 " kg (159 lb) Documented at 01/30/2021 0605        Body mass index is 24.34 kg/m².      01/31/21  0500 02/01/21  0500 02/02/21  0506   Weight: 71.2 kg (156 lb 15.5 oz) 72.4 kg (159 lb 9.8 oz) 72.6 kg (160 lb 0.9 oz)       Physical Exam:  GEN:  No acute distress, alert, cooperative, well developed, on room air  EYES:   Sclerae clear. No icterus. PERRL. Normal EOM  ENT:   External ears/nose normal, no oral lesions, no thrush, moist mucous membrane  NECK:  Supple, midline trachea, no JVD  LUNGS: Normal chest on inspection, CTAB, no wheezes. No rhonchi. No crackles. Respirations regular, even and unlabored.   CV: Controlled heart rate with irregular rhythm. Normal S1/S2. No murmurs, gallops, or rubs noted.  ABD:  Soft, nontender and nondistended. Normal bowel sounds. No guarding  EXT:  Moves all extremities well. No cyanosis. No redness. No edema.   Skin: Dry, intact, no bleeding    Results Review:        Results from last 7 days   Lab Units 02/01/21  0506 01/31/21  0525 01/30/21  0731   SODIUM mmol/L 139 141 138   POTASSIUM mmol/L 3.6 3.8 5.5*   CHLORIDE mmol/L 107 108* 102   CO2 mmol/L 19.0* 20.0* 19.7*   BUN mg/dL 51* 75* 101*   CREATININE mg/dL 1.36* 1.56* 2.03*   CALCIUM mg/dL 8.6 8.7 9.0   AST (SGOT) U/L  --  29 38   ALT (SGPT) U/L  --  5 7   ANION GAP mmol/L 13.0 13.0 16.3*   ALBUMIN g/dL  --  3.00* 3.30*     Results from last 7 days   Lab Units 01/31/21  0525 01/30/21  0731   TROPONIN T ng/mL 0.011 0.026         Results from last 7 days   Lab Units 01/31/21  0525 01/30/21  0731   PROBNP pg/mL 5,967.0* 4,595.0*     Results from last 7 days   Lab Units 02/02/21  0839 02/01/21  2044 02/01/21  0803 01/31/21  2032 01/31/21  0525 01/30/21  2312 01/30/21  1846  01/30/21  0731 01/26/21  1156   WBC 10*3/mm3 9.73 9.02 9.42 8.75 11.81* 10.58 10.56   < > 14.81* 8.03   HEMOGLOBIN g/dL 9.4* 9.4* 8.8* 8.9* 8.7* 8.6* 8.3*   < > 8.5* 13.2   HEMATOCRIT % 28.2* 29.1* 26.5* 25.6* 25.8* 24.9* 24.7*   < > 25.2* 39.4   PLATELETS  10*3/mm3 188 179 185 176 190 191 187   < > 246 179   MCV fL 100.0* 102.1* 100.8* 99.6* 101.6* 100.4* 102.5*   < > 101.2* 100.0*   NEUTROPHIL % %  --   --   --   --  68.4  --   --   --  71.3 66.2   LYMPHOCYTE % %  --   --   --   --  16.3*  --   --   --  15.7* 19.6   MONOCYTES % %  --   --   --   --  9.9  --   --   --  8.8 10.0   EOSINOPHIL % %  --   --   --   --  3.6  --   --   --  2.3 3.2   BASOPHIL % %  --   --   --   --  0.4  --   --   --  0.5 0.6   IMM GRAN % %  --   --   --   --  1.4*  --   --   --  1.4* 0.4    < > = values in this interval not displayed.     Results from last 7 days   Lab Units 01/31/21  0525 01/30/21  2312 01/30/21  0905 01/30/21  0732 01/26/21  1319   INR  1.18* 1.30* >10.00* >10.00* >10.00*   APTT seconds  --   --   --  59.6* 67.6*     Results from last 7 days   Lab Units 01/31/21  0525 01/30/21  0731   MAGNESIUM mg/dL 2.0 2.1           Invalid input(s): LDLCALC      Results from last 7 days   Lab Units 02/01/21  0803   HEMOGLOBIN A1C % 6.20*     Glucose   Date/Time Value Ref Range Status   02/02/2021 0611 137 (H) 70 - 130 mg/dL Final   02/02/2021 0037 152 (H) 70 - 130 mg/dL Final   02/01/2021 1324 208 (H) 70 - 130 mg/dL Final   02/01/2021 0504 123 70 - 130 mg/dL Final   02/01/2021 0011 139 (H) 70 - 130 mg/dL Final   01/31/2021 1728 157 (H) 70 - 130 mg/dL Final   01/31/2021 1135 161 (H) 70 - 130 mg/dL Final   01/31/2021 0524 129 70 - 130 mg/dL Final             Results from last 7 days   Lab Units 01/30/21  0732   NITRITE UA  Negative     Results from last 7 days   Lab Units 01/30/21  0831   COVID19  Not Detected     Results from last 7 days   Lab Units 01/31/21  0525   URIC ACID mg/dL 6.3           Imaging:   Imaging Results (All)        I reviewed the patient's new clinical results.  I personally viewed and interpreted the patient's imaging results:        Medication Review:   allopurinol, 100 mg, Oral, Daily  amantadine, 100 mg, Oral, Q12H  budesonide-formoterol, 2 puff, Inhalation, BID -  RT  carbidopa-levodopa, 1 tablet, Oral, TID  cetirizine, 10 mg, Oral, Daily  ferrous sulfate, 325 mg, Oral, Daily With Breakfast  insulin regular, 0-9 Units, Subcutaneous, Q6H  metoprolol tartrate, 25 mg, Oral, Q12H  montelukast, 10 mg, Oral, Nightly  pioglitazone, 15 mg, Oral, Daily  pravastatin, 40 mg, Oral, Nightly  sodium chloride, 10 mL, Intravenous, Q12H        lactated ringers, 50 mL/hr, Last Rate: Stopped (02/01/21 1423)        ASSESSMENT:   1. Coumadin toxicity, reversed  2. Acute upper GI bleed, no sign of active bleeding  3. Acute blood loss anemia  4. Excessive alcohol abuse  5. Chronic atrial fibrillation on chronic anticoagulation  6. Diabetes mellitus  7. Essential hypertension  8. Parkinson's disease  9. Acute renal failure on chronic kidney disease, patient is back to baseline creatinine was down to 1.35    10. Mild pulmonary hypertension    PLAN:  Patient is currently a telemetry overflow  ENT to evaluate today regarding the severe epistaxis and see if there are any measures to help reduce risk of recurrence  Patient is to be resumed on anticoagulation with Eliquis  Need to discontinue the IV fluid now the p.o. intake is back to baseline        Disposition: Plan to go home    Marcelo Rdz MD  02/02/21  09:20 EST          Dictated utilizing Dragon dictation

## 2021-02-02 NOTE — MBS/VFSS/FEES
Acute Care - Speech Language Pathology   Swallow Initial Evaluation [facility]     Patient Name: Dustin Villeda  : 1940  MRN: [id number]  Today's Date: 2021               Admit Date: 2021    Visit Dx:     ICD-10-CM ICD-9-CM   1. Poisoning by warfarin sodium, accidental or unintentional, initial encounter  T45.511A 964.2     E858.2   2. Anemia, unspecified type  D64.9 285.9   3. Gastrointestinal hemorrhage, unspecified gastrointestinal hemorrhage type  K92.2 578.9   4. AARON (acute kidney injury) (CMS/HCC)  N17.9 584.9     Patient Active Problem List   Diagnosis   • Closed right ankle fracture   • Poisoning by warfarin sodium   • Anemia   • Type 2 diabetes mellitus (CMS/HCC)   • Hypertension   • Hyperlipidemia   • Gout   • Parkinson disease (CMS/HCC)   • Anxiety   • Atrial fibrillation (CMS/HCC)   • 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)   • Poor compliance with medication   • Acute kidney injury superimposed on chronic kidney disease (CMS/HCC)   • Acute gastrointestinal bleeding   • Nosebleed (Resolved)   • Melena     Past Medical History:   Diagnosis Date   • Ankle fracture     RIGHT ANKLE    • Anxiety     ABOUT CURRENT HEALTH SITUATION    • Atrial fibrillation (CMS/HCC)    • Constipation due to opioid therapy     NO BOWEL MOVEMENT IN 5 DAYS   • Coronary artery disease    • Diabetes mellitus (CMS/HCC)    • Gout     BIG TOES   • Hyperlipidemia    • Hypertension    • Parkinson disease (CMS/HCC)      Past Surgical History:   Procedure Laterality Date   • ANKLE OPEN REDUCTION INTERNAL FIXATION Right 2019    Procedure: OPEN REDUCTION INTERNAL FIXATION RIGHT ANKLE;  Surgeon: Evgeny Keith II, MD;  [facility];  Service: Orthopedics   • CATARACT EXTRACTION EXTRACAPSULAR W/ INTRAOCULAR LENS IMPLANTATION Bilateral    • COLONOSCOPY     • CORONARY ANGIOPLASTY WITH STENT PLACEMENT     • ENDOSCOPY  2021    Procedure:  ESOPHAGOGASTRODUODENOSCOPY AT BEDSIDE;  Surgeon: Geoff Rosenthal MD;  Location: [facility] ENDOSCOPY;  Service: Gastroenterology;;  PRE OP - BLACK STOOLS  POST OP - CANDIDA   • EXTRACORPOREAL SHOCK WAVE LITHOTRIPSY (ESWL)      OVER  10 YEARS AGO   • PILONIDAL CYSTECTOMY  1964     Patient was not wearing a face mask during this therapy encounter. Therapist used appropriate personal protective equipment including mask, eye protection and gloves.  Mask used was standard procedure mask. Appropriate PPE was worn during the entire therapy session. Hand hygiene was completed before and after therapy session. Patient is not in enhanced droplet precautions.        SWALLOW EVALUATION (last 72 hours)      SLP Adult Swallow Evaluation     Row Name 02/02/21 1542 02/02/21 1500 02/01/21 1300             Rehab Evaluation    Document Type  --  evaluation  -AW  evaluation  -AW      Subjective Information  --  no complaints  -AW  no complaints  -AW      Patient Observations  --  alert;cooperative;agree to therapy  -AW  alert;cooperative;agree to therapy  -AW      Care Plan Review  --  evaluation/treatment results reviewed;patient/other agree to care plan  -AW  evaluation/treatment results reviewed;patient/other agree to care plan  -AW      Patient Effort  --  good  -AW  good  -AW      Symptoms Noted During/After Treatment  --  none  -AW  none  -AW         General Information    Patient Profile Reviewed  --  yes  -AW  yes  -AW      Pertinent History Of Current Problem  --  Pt admitted with weakness, GI bleed, coumadin toxicity; h/o chronic A fib, alcohol abuse, recent diagnosis of Parkinson's Disease.  -AW  Pt admitted with weakness, GI bleed, coumadin toxicity; h/o chronic A fib, alcohol abuse, recent diagnosis of Parkinson's Disease.  -AW      Current Method of Nutrition  --  thin liquids;regular textures  -AW  regular textures;thin liquids  -AW      Precautions/Limitations, Vision  --  WFL with corrective lenses  -AW  WFL;for purposes  of eval  -AW      Precautions/Limitations, Hearing  --  WFL  -AW  WFL;for purposes of eval  -AW      Prior Level of Function-Communication  --  WFL  -AW  WFL  -AW      Prior Level of Function-Swallowing  --  no diet consistency restrictions  -AW  no diet consistency restrictions  -AW      Plans/Goals Discussed with  --  patient;agreed upon  -AW  patient;agreed upon  -AW      Barriers to Rehab  --  none identified  -AW  none identified  -AW      Patient's Goals for Discharge  --  return home  -AW  return home  -AW         Pain    Additional Documentation  --  Pain Scale: Numbers Pre/Post-Treatment (Group)  -AW  Pain Scale: Numbers Pre/Post-Treatment (Group)  -AW         Pain Scale: Numbers Pre/Post-Treatment    Pretreatment Pain Rating  --  0/10 - no pain  -AW  0/10 - no pain  -AW      Posttreatment Pain Rating  --  0/10 - no pain  -AW  0/10 - no pain  -AW         Oral Motor Structure and Function    Dentition Assessment  --  --  upper dentures/partial in place  -AW      Secretion Management  --  WNL/WFL  -AW  WNL/WFL  -AW      Mucosal Quality  --  moist, healthy  -AW  moist, healthy  -AW      Volitional Swallow  --  --  delayed  -AW         Oral Musculature and Cranial Nerve Assessment    Oral Motor General Assessment  --  --  WFL  -AW         General Eating/Swallowing Observations    Respiratory Support Currently in Use  --  room air  -AW  room air  -AW      Eating/Swallowing Skills  --  self-fed;fed by SLP  -AW  self-fed;fed by SLP  -AW      Positioning During Eating  --  upright in bed  -AW  upright in bed  -AW      Utensils Used  --  --  spoon;cup;straw  -AW      Consistencies Trialed  --  --  regular textures;soft textures;mixed consistency;pureed;thin liquids  -AW      Pre SpO2 (%)  --  --  98  -AW      Post SpO2 (%)  --  --  100  -AW         Clinical Swallow Eval    Pharyngeal Phase  --  --  suspected pharyngeal impairment  -AW      Esophageal Phase  --  --  suspected esophageal impairment  -AW      Clinical  "Swallow Evaluation Summary  --  --  Clinical swallow eval completed. Pt had coughing prior to eval. Pt tolerated thins via cup and straw. Mastication was functional for soft, mixed, and reglar trials. No coughing noted after trials, however, at end of eval during discussion, significant coughing began. Pt stated this is normal for him and he always gets a \"little tickle\" in his throat. Question pharyngeal residual or esophageal dysfunction. Recommend continue regular diet with thin. ST will complete VFSS 2/2.    -AW         MBS/VFSS    Utensils Used  --  spoon;cup;straw  -AW  --      Consistencies Trialed  --  regular textures;soft textures;mixed consistency;pureed;thin liquids;nectar/syrup-thick liquids  -AW  --         MBS/VFSS Interpretation    Oral Transit Phase  --  -AW  impaired  -AW  --      VFSS Summary  --  Pt exhibited mild oropharyngeal dysphagia characterized by mistiming/delayed swallow with a prominent cricopharyngeus noted. Pt had premature spillage of all consistencies to the valleculae and to the pyriforms with mixed. Pt tolerated thins via cup and straw with inconsistent trace shallow transient penetration. Bolus formation and mastication was functional for pureed, soft, mixed, and regular consistencies. Mild pharyngeal residuals (tongue base, pyriforms, valleculae) were cleared with a spontaneous swallow. With regular, a thin wash assisted in clearing. An esophageal scan was unremarkable. During the study, question soft tissue changes noted in the pyriforms, ENT consult pending due to chronic cough and nosebleeds. After the study as pt waited for transport, pt had a significant coughing spell similar to yesterday after the bedside swallow. Pt stated this is common for him at random times due to a tickle in his throat.     -AW  --         Initiation of Pharyngeal Swallow    Pharyngeal Phase  --  -AW  impaired pharyngeal phase of swallowing  -AW  --         SLP Communication to Radiology    Summary " Statement  --  Pt exhibited mild oropharyngeal dysphagia characterized by mistiming/delayed swallow with a prominent cricopharyngeus noted. Pt had premature spillage of all consistencies to the valleculae and to the pyriforms with mixed. Pt tolerated thins via cup and straw with inconsistent trace shallow transient penetration. Bolus formation and mastication was functional for pureed, soft, mixed, and regular consistencies. Mild pharyngeal residuals (tongue base, pyriforms, valleculae) were cleared with a spontaneous swallow. With regular, a thin wash assisted in clearing. An esophageal scan was unremarkable.    -AW  --         Clinical Impression    SLP Swallowing Diagnosis  --  mild;oral dysphagia;pharyngeal dysphagia  -AW  suspected pharyngeal dysphagia;esophageal dysphagia  -AW      Functional Impact  --  risk of aspiration/pneumonia  -AW  risk of aspiration/pneumonia  -AW      Rehab Potential/Prognosis, Swallowing  --  good, to achieve stated therapy goals  -AW  good, to achieve stated therapy goals  -AW      Swallow Criteria for Skilled Therapeutic Interventions Met  --  demonstrates skilled criteria  -AW  demonstrates skilled criteria  -AW         Recommendations    Therapy Frequency (Swallow)  --  PRN  -AW  PRN  -AW      Predicted Duration Therapy Intervention (Days)  --  until discharge  -AW  until discharge  -AW      SLP Diet Recommendation  --  regular textures;thin liquids  -AW  regular textures;thin liquids  -AW      Recommended Diagnostics  --  --  VFSS (MBS)  -AW      Recommended Precautions and Strategies  --  upright posture during/after eating;small bites of food and sips of liquid  -AW  upright posture during/after eating;small bites of food and sips of liquid  -AW      Oral Care Recommendations  --  Oral Care BID/PRN  -AW  Oral Care BID/PRN  -AW      SLP Rec. for Method of Medication Administration  --  meds whole;with thin liquids;with pudding or applesauce;as tolerated  -AW  meds whole;with  thin liquids;with pudding or applesauce;as tolerated  -AW      Monitor for Signs of Aspiration  --  yes;notify SLP if any concerns  -AW  yes;notify SLP if any concerns  -AW      Anticipated Discharge Disposition (SLP)  --  unknown  -AW  unknown  -AW         Swallow Goals (SLP)    Oral Nutrition/Hydration Goal Selection (SLP)  --  --  oral nutrition/hydration, SLP goal 1  -AW         Oral Nutrition/Hydration Goal 1 (SLP)    Oral Nutrition/Hydration Goal 1, SLP  --  --  Pt will safely tolerate the least restrictive diet with no s/s of aspiration.  -AW      Time Frame (Oral Nutrition/Hydration Goal 1, SLP)  --  --  by discharge  -AW        User Key  (r) = Recorded By, (t) = Taken By, (c) = Cosigned By    Initials Name Effective Dates    Pinky Menezes, MS CCC-SLP 06/08/18 -           EDUCATION  The patient has been educated in the following areas:   Dysphagia (Swallowing Impairment) Oral Care/Hydration.    SLP Recommendation and Plan  SLP Swallowing Diagnosis: mild, oral dysphagia, pharyngeal dysphagia  SLP Diet Recommendation: regular textures, thin liquids  Recommended Precautions and Strategies: upright posture during/after eating, small bites of food and sips of liquid  SLP Rec. for Method of Medication Administration: meds whole, with thin liquids, with pudding or applesauce, as tolerated     Monitor for Signs of Aspiration: yes, notify SLP if any concerns     Swallow Criteria for Skilled Therapeutic Interventions Met: demonstrates skilled criteria  Anticipated Discharge Disposition (SLP): unknown  Rehab Potential/Prognosis, Swallowing: good, to achieve stated therapy goals  Therapy Frequency (Swallow): PRN  Predicted Duration Therapy Intervention (Days): until discharge                         Plan of Care Reviewed With: patient  Outcome Summary: VFSS completed. Pt had a slight swallow delay and inconsistent trace transient penetration with thins. No aspiration was observed. Pt had a prominent cricopharyngeus  (slight) with an unremarkable esophageal scan. Recommend pt continue on regular and thin; meds as tolerated; upright for meals and 30 min after; slow rate; small bites/sips. During the study, question soft tissue changes noted in the pyriforms, ENT consult pending due to chronic cough and nosebleeds. ST to follow for diet tolerance    SLP GOALS     Row Name 02/01/21 1300             Oral Nutrition/Hydration Goal 1 (SLP)    Oral Nutrition/Hydration Goal 1, SLP  Pt will safely tolerate the least restrictive diet with no s/s of aspiration.  -AW      Time Frame (Oral Nutrition/Hydration Goal 1, SLP)  by discharge  -AW        User Key  (r) = Recorded By, (t) = Taken By, (c) = Cosigned By    Initials Name Provider Type    Pinky Menezes MS CCC-SLP Speech and Language Pathologist           SLP Outcome Measures (last 72 hours)      SLP Outcome Measures     Row Name 02/02/21 1600 02/01/21 1300          SLP Outcome Measures    Outcome Measure Used?  Adult NOMS  -AW  Adult NOMS  -AW        Adult FCM Scores    FCM Chosen  Swallowing  -AW  Swallowing  -AW     Swallowing FCM Score  6  -AW  6  -AW       User Key  (r) = Recorded By, (t) = Taken By, (c) = Cosigned By    Initials Name Effective Dates    Pinky Menezes MS CCC-SLP 06/08/18 -            Time Calculation:   Time Calculation- SLP     Row Name 02/02/21 1602             Time Calculation- SLP    SLP Start Time  1500  -AW      SLP Received On  02/02/21  -AW        User Key  (r) = Recorded By, (t) = Taken By, (c) = Cosigned By    Initials Name Provider Type    Pinky Menezes MS CCC-SLP Speech and Language Pathologist          Therapy Charges for Today     Code Description Service Date Service Provider Modifiers Qty    27470527448 HC ST EVAL ORAL PHARYNG SWALLOW 4 2/1/2021 Pinky Arambula MS CCC-SLP GN 1    [id number] HC ST MOTION FLUORO EVAL SWALLOW 5 2/2/2021 Pinky Arambula MS CCC-SLP GN 1               Pinky Arambula MS CCC-GABRIEL  2/2/2021

## 2021-02-03 NOTE — PROGRESS NOTES
"  PROGRESS NOTE  Patient Name: Dustin Villeda  Age/Sex: 80 y.o. male  : 1940  MRN: [id number]    Date of Admission: 2021  Date of Encounter Visit: 21   LOS: 4 days   Patient Care Team:  Mal Barboza MD as PCP - General (Internal Medicine)    Chief Complaint: Came in with acute epistaxis, Coumadin toxicity, chronic atrial fibrillation    Hospital course: Patient was evaluated by ENT and was cleared to resume his oral anticoagulation, will try to hold on the aspirin no longer.  Patient is currently on room air, hemodynamically stable, feeling better, awake and alert.  ENT note was reviewed, he is usually followed by Dr. barboza  REVIEW OF SYSTEMS:   CONSTITUTIONAL: no fever or chills  CARDIOVASCULAR: No chest pain, chest pressure or chest discomfort. No palpitations or edema.  Patient is in chronic A. fib  RESPIRATORY: No shortness of breath, cough or sputum.   GASTROINTESTINAL: No anorexia, nausea, vomiting or diarrhea. No abdominal pain no evidence of any ongoing GI bleed clinically.   HEMATOLOGIC: No more melena      Ventilator/Non-Invasive Ventilation Settings (From admission, onward)     on room air            Vital Signs  Temp:  [98.2 °F (36.8 °C)-98.9 °F (37.2 °C)] 98.8 °F (37.1 °C)  Heart Rate:  [105-129] 117  Resp:  [16-22] 16  BP: (118-131)/(77-82) 118/77  SpO2:  [96 %-100 %] 97 %  on    Device (Oxygen Therapy): room air    Intake/Output Summary (Last 24 hours) at 2/3/2021 1755  Last data filed at 2/3/2021 1610  Gross per 24 hour   Intake --   Output 200 ml   Net -200 ml     Flowsheet Rows      First Filed Value   Admission Height  172.7 cm (68\") Documented at 2021 0605   Admission Weight  72.1 kg (159 lb) Documented at 2021 0605        Body mass index is 24.21 kg/m².      21  0506 21  1521 21  0500   Weight: 72.6 kg (160 lb 0.9 oz) 70.5 kg (155 lb 6.8 oz) 72.2 kg (159 lb 3.2 oz)       Physical Exam:  GEN:  No acute distress, alert, cooperative, well " developed, on room air  EYES:   Sclerae clear. No icterus. PERRL. Normal EOM  ENT:   External ears/nose normal, no oral lesions, no thrush, moist mucous membrane  NECK:  Supple, midline trachea, no JVD  LUNGS: Normal chest on inspection, CTAB, no wheezes. No rhonchi. No crackles. Respirations regular, even and unlabored.   CV: Controlled heart rate with irregular rhythm. Normal S1/S2. No murmurs, gallops, or rubs noted.  ABD:  Soft, nontender and nondistended. Normal bowel sounds. No guarding  EXT:  Moves all extremities well. No cyanosis. No redness. No edema.   Skin: Dry, intact, no bleeding    Results Review:        Results from last 7 days   Lab Units 02/03/21  0813 02/01/21  0506 01/31/21  0525 01/30/21  0731   SODIUM mmol/L 138 139 141 138   POTASSIUM mmol/L 4.0 3.6 3.8 5.5*   CHLORIDE mmol/L 105 107 108* 102   CO2 mmol/L 20.7* 19.0* 20.0* 19.7*   BUN mg/dL 31* 51* 75* 101*   CREATININE mg/dL 1.22 1.36* 1.56* 2.03*   CALCIUM mg/dL 8.5* 8.6 8.7 9.0   AST (SGOT) U/L  --   --  29 38   ALT (SGPT) U/L  --   --  5 7   ANION GAP mmol/L 12.3 13.0 13.0 16.3*   ALBUMIN g/dL  --   --  3.00* 3.30*     Results from last 7 days   Lab Units 01/31/21  0525 01/30/21  0731   TROPONIN T ng/mL 0.011 0.026         Results from last 7 days   Lab Units 01/31/21  0525 01/30/21  0731   PROBNP pg/mL 5,967.0* 4,595.0*     Results from last 7 days   Lab Units 02/03/21  0813 02/02/21 2015 02/02/21  0839 02/01/21 2044 02/01/21  0803 01/31/21 2032 01/31/21  0525  01/30/21  0731   WBC 10*3/mm3 9.85 10.22 9.73 9.02 9.42 8.75 11.81*   < > 14.81*   HEMOGLOBIN g/dL 9.6* 10.1* 9.4* 9.4* 8.8* 8.9* 8.7*   < > 8.5*   HEMATOCRIT % 29.5* 30.1* 28.2* 29.1* 26.5* 25.6* 25.8*   < > 25.2*   PLATELETS 10*3/mm3 197 204 188 179 185 176 190   < > 246   MCV fL 102.8* 100.7* 100.0* 102.1* 100.8* 99.6* 101.6*   < > 101.2*   NEUTROPHIL % %  --   --   --   --   --   --  68.4  --  71.3   LYMPHOCYTE % %  --   --   --   --   --   --  16.3*  --  15.7*   MONOCYTES %  %  --   --   --   --   --   --  9.9  --  8.8   EOSINOPHIL % %  --   --   --   --   --   --  3.6  --  2.3   BASOPHIL % %  --   --   --   --   --   --  0.4  --  0.5   IMM GRAN % %  --   --   --   --   --   --  1.4*  --  1.4*    < > = values in this interval not displayed.     Results from last 7 days   Lab Units 01/31/21  0525 01/30/21  2312 01/30/21  0905 01/30/21  0732   INR  1.18* 1.30* >10.00* >10.00*   APTT seconds  --   --   --  59.6*     Results from last 7 days   Lab Units 01/31/21  0525 01/30/21  0731   MAGNESIUM mg/dL 2.0 2.1           Invalid input(s): LDLCALC      Results from last 7 days   Lab Units 02/01/21  0803   HEMOGLOBIN A1C % 6.20*     Glucose   Date/Time Value Ref Range Status   02/03/2021 1557 165 (H) 70 - 130 mg/dL Final   02/03/2021 1058 123 70 - 130 mg/dL Final   02/03/2021 0606 127 70 - 130 mg/dL Final   02/03/2021 0025 129 70 - 130 mg/dL Final   02/02/2021 2114 126 70 - 130 mg/dL Final   02/02/2021 1609 164 (H) 70 - 130 mg/dL Final   02/02/2021 1145 166 (H) 70 - 130 mg/dL Final   02/02/2021 0611 137 (H) 70 - 130 mg/dL Final             Results from last 7 days   Lab Units 01/30/21  0732   NITRITE UA  Negative     Results from last 7 days   Lab Units 01/30/21  0831   COVID19  Not Detected     Results from last 7 days   Lab Units 01/31/21  0525   URIC ACID mg/dL 6.3           Imaging:   Imaging Results (All)        I reviewed the patient's new clinical results.  I personally viewed and interpreted the patient's imaging results:        Medication Review:   allopurinol, 100 mg, Oral, Daily  amantadine, 100 mg, Oral, Q12H  apixaban, 5 mg, Oral, Q12H  budesonide-formoterol, 2 puff, Inhalation, BID - RT  carbidopa-levodopa, 1 tablet, Oral, TID  cetirizine, 10 mg, Oral, Daily  ferrous sulfate, 325 mg, Oral, Daily With Breakfast  insulin regular, 0-9 Units, Subcutaneous, Q6H  metoprolol tartrate, 50 mg, Oral, Q12H  montelukast, 10 mg, Oral, Nightly  pioglitazone, 15 mg, Oral, Daily  pravastatin, 40  mg, Oral, Nightly  sodium chloride, 10 mL, Intravenous, Q12H             ASSESSMENT:   1. Coumadin toxicity, reversed  2. Acute upper GI bleed, no sign of active bleeding  3. Acute blood loss anemia  4. Excessive alcohol abuse  5. Chronic atrial fibrillation on chronic anticoagulation  6. Diabetes mellitus  7. Essential hypertension  8. Parkinson's disease  9. Acute renal failure on chronic kidney disease, patient is back to baseline creatinine was down to 1.35    10. Mild pulmonary hypertension    PLAN:  Seen by ENT, cleared for resuming anticoagulation and is back on the apixaban  No more Coumadin given his repeated episodes of Coumadin toxicity.  Patient is doing well hemodynamically stable and on room air  Patient is already followed by Dr. richey who is his primary care physician and we will ask if he can take over the admitting privileges since the patient has no more acute respiratory or critical care issues     we will transfer the admitting privileges to the primary care physician and we will sign off but will be available in case if there is any question or concern    Disposition: Plan to go home    Marcelo Rdz MD  02/03/21  17:55 EST          Dictated utilizing Dragon dictation

## 2021-02-03 NOTE — PROGRESS NOTES
LOS: 3 days   Patient Care Team:  Mal Barboza MD as PCP - General  Mal Barboza MD as PCP - Family Medicine    Chief Complaint:   Chief Complaint   Patient presents with   • Weakness - Generalized   • Fatigue             Today the patient is awake, alert, but looks somewhat fatigued.  He sitting at the bedside eating and drinking well.  He passed a swallowing evaluation.  Nonetheless he still has a persistent cough.  He is to see the ENT today to evaluate the epistaxis to see if there is anything else contributing to his cough.  He has been seen and evaluated by his allergist in the past and treated with multiple medications with some benefit but without total resolution of symptoms.  Otherwise he seems to be relatively stable and doing well.  His wife was in the room today and I had a long discussion with her about medications, follow-up with me, and cardiology.    Interval History:     Patient Complaints: His primary concern is some fatigue and the cough.  He continues to sleep poorly.  Patient Denies: He denies shortness of breath or chest pain.  No nausea or vomiting.  History taken from: patient chart family RN    Review of Systems:    Overall his review of systems primarily revolves around his cough and fatigue.  He seems to be tolerating his medications well.  He understands that he will be started on his Eliquis today.  He is agreeable to getting out of bed more and walking as well.  He has no complaints of shortness of breath or chest pain.  No abdominal pain or nausea.  No other joint aches or pains.  The cough troublesome the most at this point.  He also understands that his heart rate is very fast still and that medications need to be adjusted.          Vital Signs  Temp:  [98 °F (36.7 °C)-99.2 °F (37.3 °C)] 98.9 °F (37.2 °C)  Pulse:  [] 109  Resp:  [18] 18  BP: (111-154)/(55-97) 154/97    I/O'S  I/O last 3 completed shifts:  In: 240 [P.O.:240]  Out: 625 [Urine:625]  No  intake/output data recorded.    Physical Exam:   General Appearance alert, pleasant, appears stated age, cooperative and looks both tired and disheveled today.  As noted he slept poorly again last night.  He also had his cough during exam.  It is dry and nonproductive.  Lungs clear to auscultation, respirations regular, respirations even, respirations unlabored and his cough seems deep at times but dry.  No wheezes or rhonchi were noted his O2 sats are well-maintained even with the coughing.  Heart tachycardia and the monitor indicates that it is persistently 115 to 130 bpm during the time that I was in the room.  Abdomen normal bowel sounds, no masses, no hepatomegaly, no splenomegaly, soft non-tender, no guarding and no rebound tenderness  Extremities moves extremities well, no edema, no cyanosis and no redness     Results Review:     I reviewed the patient's new clinical results.  I reviewed the patient's other test results and agree with the interpretation                     Results from last 7 days   Lab Units 02/01/21  0506   SODIUM mmol/L 139   POTASSIUM mmol/L 3.6   CHLORIDE mmol/L 107   CO2 mmol/L 19.0*   BUN mg/dL 51*   CREATININE mg/dL 1.36*   GLUCOSE mg/dL 126*   CALCIUM mg/dL 8.6     Results from last 7 days   Lab Units 01/31/21  0525 01/30/21  0731   TROPONIN T ng/mL 0.011 0.026     Results from last 7 days   Lab Units 02/02/21 2015   WBC 10*3/mm3 10.22   HEMOGLOBIN g/dL 10.1*   HEMATOCRIT % 30.1*   PLATELETS 10*3/mm3 204     Results from last 7 days   Lab Units 01/31/21  0525 01/30/21  2312 01/30/21  0905 01/30/21  0732   INR  1.18* 1.30* >10.00* >10.00*   APTT seconds  --   --   --  59.6*     Results from last 7 days   Lab Units 01/31/21  0525   MAGNESIUM mg/dL 2.0         Results from last 7 days   Lab Units 01/31/21  0525   PROBNP pg/mL 5,967.0*             Medication Review:   Scheduled Meds:allopurinol, 100 mg, Oral, Daily  amantadine, 100 mg, Oral, Q12H  apixaban, 5 mg, Oral,  Q12H  budesonide-formoterol, 2 puff, Inhalation, BID - RT  carbidopa-levodopa, 1 tablet, Oral, TID  cetirizine, 10 mg, Oral, Daily  ferrous sulfate, 325 mg, Oral, Daily With Breakfast  insulin regular, 0-9 Units, Subcutaneous, Q6H  metoprolol tartrate, 50 mg, Oral, Q12H  montelukast, 10 mg, Oral, Nightly  pioglitazone, 15 mg, Oral, Daily  pravastatin, 40 mg, Oral, Nightly  sodium chloride, 10 mL, Intravenous, Q12H      Continuous Infusions:   PRN Meds:.•  acetaminophen **OR** acetaminophen  •  ALPRAZolam  •  dextrose  •  dextrose  •  glucagon (human recombinant)  •  ondansetron **OR** ondansetron  •  sodium chloride  •  sodium chloride              Patient Active Problem List   Diagnosis   • Closed right ankle fracture   • Poisoning by warfarin sodium   • Anemia   • Type 2 diabetes mellitus (CMS/HCC)   • Hypertension   • Hyperlipidemia   • Gout   • Parkinson disease (CMS/HCC)   • Anxiety   • Atrial fibrillation (CMS/HCC)   • 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)   • Poor compliance with medication   • Acute kidney injury superimposed on chronic kidney disease (CMS/HCC)   • Acute gastrointestinal bleeding   • Nosebleed (Resolved)   • Melena     #1 Epistaxis/Anemia-at this point he does not have any evidence for active bleeding and his blood count remained stable today.  It is actually is a little better today than yesterday.  He is to see the ENT doctor today and hopefully that will go well.    2.  Atrial fibrillation/Coumadin toxicity-he is off the Coumadin and on Eliquis now at 5 mg twice daily.  His atrial fibrillation continues to be poorly controlled.  That may be contributing to some of his fatigue.  His rate was 115 to 130 bpm when I saw him today.  I will increase his metoprolol up to 50 mg twice daily.  Otherwise assuming everything goes well with the ENT doctor he should be able to be discharged tomorrow.    3.  ASCVD-no complaints of chest pains or other  cardiac symptoms.  He complains of fatigue but not really shortness of breath.  I have asked the nurse to get him up out of bed and to walk some today and to be out of bed more.    4.  Parkinson's disease-seems relatively stable and he has much less in the way of symptoms now that he has not been drinking so much.    5.  CKD-his GFR seems relatively stable.    6.  Diabetes mellitus type 2-his sugars continue to be well controlled also.    7.  Hypertension-his blood pressure looks good today despite his rapid rate.    8.  Gout-no complaints prescription.    9.  Anxiety/insomnia-he says the Xanax helped him go to sleep last night but apparently they disturb him at some point and he had a difficult time going back to sleep.  Hopefully he will do better tonight.            Plan for disposition:Where: home and When:  tomorrow    Mal Barboza MD  02/03/21  08:11 EST          Time:

## 2021-02-03 NOTE — PLAN OF CARE
Goal Outcome Evaluation:  Plan of Care Reviewed With: patient     Outcome Summary: Pt. is a 80 year old Male admitted to the hospital with general weakness and an acute G.I. Bleed. Pt. reports that prior to admission he was independent with functional mobility. Pt. currently presents with decreased strength, decreased balance, and decreased tolerance to functional activity.  Pt. will benefit from skilled inpt. P.T. to address his functional deficits and to assist pt. in regaining his maximum level of independence with functional mobility.    Patient was wearing a face mask during this therapy encounter. Therapist used appropriate personal protective equipment including eye protection, mask, and gloves.  Mask used was standard procedure mask. Appropriate PPE was worn during the entire therapy session. Hand hygiene was completed before and after therapy session. Patient is not in enhanced droplet precautions.

## 2021-02-03 NOTE — CONSULTS
Gentleman who had been evaluated in ED for nosebleed found to have significantly elevated INR then continued taking coumadin after being advised to stop.  Admitted now with coumadin toxicity and what was thought to possible be gi bleed.  Endo was essentially negative.  Bleeding has stopped. We are asked to assess nasal cavity to identify possible cause of bleeding.    All data relevant to current hospitalization reviewed.    PE:  Seated in chair, somnolent.    No active bleeding seen.      Anterior rhinoscopy shows crusting on the right.  No clot.      Oropharynx negative. Neck negative.    Nasal endo exam:  Circular superficial ulceration right anterior septum with crusting. Yellow in color.  No deep erosion. No vascular lesions.    No nasal polyps of masses identified.  Left nasal passage essentially normal.    Imp:  Epistaxis in a gentleman with coumadin toxicity now stable; right septal ulceration    Rec:  Mupirocin 2% cream apply bid to right nostril for 12 days (rx given to family to begin after discharge)    Comment:  In my opinion ok to d/c home to initiate the mupirocin and ok to start new anticoagulant regimen.     Bedside humidification, saline spray 3 to 4 times daily would also be recommended.      Note: I was not able to assess the patient for chronic cough during today's consultation--very happy to see patient for follow up after discharge to further address this issue.

## 2021-02-03 NOTE — PROGRESS NOTES
Continued Stay Note  [facility]     Patient Name: Dustin Villeda  MRN: [id number]  Today's Date: 2/3/2021    Admit Date: 1/30/2021    Discharge Plan     Row Name 02/03/21 3086       Plan    Plan  Home with spouse. Possible HH.    Plan Comments  CCP spoke w/ [employer] - Eliquis was delivered to pt yesterday for $0.  He states next refill could be around $500 unless deductible is met by that time.  After deductible met, refills should be $47.  CCP spoke w/ pt and his wife Cecy.  They were aware of the cost of the Eliquis.  They will discuss w/ his PCP if the cost does not go down after the next refill.  CCP discussed possible HH.  They will talk abt it and let CCP know. ..............sk        Discharge Codes    No documentation.             Nayely Hdz RN

## 2021-02-03 NOTE — PROGRESS NOTES
He was off in the ENT clinic today and I could not see him.  He has had persistent A. fib his rates have been a little bit quick Dr. Gunter and increased his metoprolol.  When he came and his INR was greater than 10 so I think warfarin is probably not a great drug for him once he gets cleared by ENT we may want to try him on Eliquis I think that might be safer.  We will try and see him again tomorrow

## 2021-02-03 NOTE — THERAPY EVALUATION
Patient Name: Dustin Villeda  : 1940    MRN: [id number]                              Today's Date: 2/3/2021       Admit Date: 2021    Visit Dx:     ICD-10-CM ICD-9-CM   1. Poisoning by warfarin sodium, accidental or unintentional, initial encounter  T45.511A 964.2     E858.2   2. Anemia, unspecified type  D64.9 285.9   3. Gastrointestinal hemorrhage, unspecified gastrointestinal hemorrhage type  K92.2 578.9   4. AARON (acute kidney injury) (CMS/[facility])  N17.9 584.9     Patient Active Problem List   Diagnosis   • Closed right ankle fracture   • Poisoning by warfarin sodium   • Anemia   • Type 2 diabetes mellitus (CMS/[facility])   • Hypertension   • Hyperlipidemia   • Gout   • Parkinson disease (CMS/HCC)   • Anxiety   • Atrial fibrillation (CMS/HCC)   • Coronary artery disease involving native coronary artery with angina pectoris (CMS/[facility])   • Poor compliance with medication   • Acute kidney injury superimposed on chronic kidney disease (CMS/[facility])   • Acute gastrointestinal bleeding   • Nosebleed (Resolved)   • Melena     Past Medical History:   Diagnosis Date   • Ankle fracture     RIGHT ANKLE    • Anxiety     ABOUT CURRENT HEALTH SITUATION    • Atrial fibrillation (CMS/[facility])    • Constipation due to opioid therapy     NO BOWEL MOVEMENT IN 5 DAYS   • Coronary artery disease    • Diabetes mellitus (CMS/[facility])    • Gout     BIG TOES   • Hyperlipidemia    • Hypertension    • Parkinson disease (CMS/HCC)      Past Surgical History:   Procedure Laterality Date   • ANKLE OPEN REDUCTION INTERNAL FIXATION Right 2019    Procedure: OPEN REDUCTION INTERNAL FIXATION RIGHT ANKLE;  Surgeon: Evgeny Keith II, MD;  Location: [facility];  Service: Orthopedics   • CATARACT EXTRACTION EXTRACAPSULAR W/ INTRAOCULAR LENS IMPLANTATION Bilateral    • COLONOSCOPY     • CORONARY ANGIOPLASTY WITH STENT PLACEMENT     • ENDOSCOPY  2021    Procedure: ESOPHAGOGASTRODUODENOSCOPY AT BEDSIDE;  Surgeon: Geoff Rosenthal  MD;  Location: [facility] ENDOSCOPY;  Service: Gastroenterology;;  PRE OP - BLACK STOOLS  POST OP - CANDIDA   • EXTRACORPOREAL SHOCK WAVE LITHOTRIPSY (ESWL)      OVER  10 YEARS AGO   • PILONIDAL CYSTECTOMY  1964     General Information     Row Name 02/03/21 1527          Physical Therapy Time and Intention    Document Type  evaluation Pt. admitted with general weakness and an Acute GI Bleed. Pt. with h/o Parkinsons disease  -MS     Mode of Treatment  physical therapy;individual therapy  -MS     Row Name 02/03/21 1527          General Information    Patient Profile Reviewed  yes  -MS     Prior Level of Function  independent:  -MS     Existing Precautions/Restrictions  (S) fall Exit alarm  -MS     Barriers to Rehab  none identified  -MS     Row Name 02/03/21 1527          Cognition    Orientation Status (Cognition)  oriented x 3  -MS     Row Name 02/03/21 1527          Safety Issues, Functional Mobility    Comment, Safety Issues/Impairments (Mobility)  Gait belt used for safety.  -MS       User Key  (r) = Recorded By, (t) = Taken By, (c) = Cosigned By    Initials Name Provider Type    MS Sumit Lee, PT Physical Therapist        Mobility     Row Name 02/03/21 1528          Bed Mobility    Bed Mobility  supine-sit;sit-supine  -MS     Supine-Sit [place] (Bed Mobility)  minimum assist (75% patient effort)  -MS     Sit-Supine [place] (Bed Mobility)  minimum assist (75% patient effort)  -MS     Row Name 02/03/21 1528          Sit-Stand Transfer    Sit-Stand [place] (Transfers)  contact guard  -MS     Row Name 02/03/21 1528          Gait/Stairs (Locomotion)    [place] Level (Gait)  contact guard  -MS     Distance in Feet (Gait)  200 feet;  x 1 seated rest break due to fatigue and SOA.  -MS     Deviations/Abnormal Patterns (Gait)  sandeep decreased;stride length decreased  -MS     [place] Level (Stairs)  contact guard  -MS     Handrail Location (Stairs)  left side (ascending)  -MS     Number of  Steps (Stairs)  8  -MS     Ascending Technique (Stairs)  step-to-step  -MS     Descending Technique (Stairs)  step-to-step  -MS     Comment (Gait/Stairs)  Verbal/tactile cues to increase his bilateral step length  -MS       User Key  (r) = Recorded By, (t) = Taken By, (c) = Cosigned By    Initials Name Provider Type    Sumit Castro L, PT Physical Therapist        Obj/Interventions     Row Name 02/03/21 1529          Range of Motion Comprehensive    Comment, General Range of Motion  BUE/LE (WFL's)  -MS     Row Name 02/03/21 1529          Strength Comprehensive (MMT)    Comment, General Manual Muscle Testing (MMT) Assessment  BUE/LE (3+/5)  -MS       User Key  (r) = Recorded By, (t) = Taken By, (c) = Cosigned By    Initials Name Provider Type    Sumit Castro MAGGY, PT Physical Therapist        Goals/Plan     Row Name 02/03/21 1530          Bed Mobility Goal 1 (PT)    Activity/Assistive Device (Bed Mobility Goal 1, PT)  bed mobility activities, all  -MS     [place] Level/Cues Needed (Bed Mobility Goal 1, PT)  independent  -MS     Time Frame (Bed Mobility Goal 1, PT)  long term goal (LTG);1 week  -MS     Row Name 02/03/21 1530          Transfer Goal 1 (PT)    Activity/Assistive Device (Transfer Goal 1, PT)  transfers, all  -MS     [place] Level/Cues Needed (Transfer Goal 1, PT)  independent  -MS     Time Frame (Transfer Goal 1, PT)  long term goal (LTG);1 week  -MS     Row Name 02/03/21 1530          Gait Training Goal 1 (PT)    Activity/Assistive Device (Gait Training Goal 1, PT)  gait (walking locomotion)  -MS     [place] Level (Gait Training Goal 1, PT)  independent  -MS     Distance (Gait Training Goal 1, PT)  300 feet  -MS     Time Frame (Gait Training Goal 1, PT)  long term goal (LTG);1 week  -MS       User Key  (r) = Recorded By, (t) = Taken By, (c) = Cosigned By    Initials Name Provider Type    Sumit Castro MAGGY, PT Physical Therapist        Clinical Impression     Row Name 02/03/21  1529          Pain    Additional Documentation  Pain Scale: Numbers Pre/Post-Treatment (Group)  -MS     Row Name 02/03/21 1529          Pain Scale: Numbers Pre/Post-Treatment    Pretreatment Pain Rating  0/10 - no pain  -MS     Posttreatment Pain Rating  0/10 - no pain  -MS     Row Name 02/03/21 1529          Plan of Care Review    Plan of Care Reviewed With  patient  -MS     Row Name 02/03/21 1529          Therapy Assessment/Plan (PT)    Rehab Potential (PT)  good, to achieve stated therapy goals  -MS     Criteria for Skilled Interventions Met (PT)  skilled treatment is necessary  -MS     Row Name 02/03/21 1529          Positioning and Restraints    Pre-Treatment Position  in bed  -MS     Post Treatment Position  bed  -MS     In Bed  notified nsg;supine;call light within reach;encouraged to call for assist;exit alarm on All lines intact.  -MS       User Key  (r) = Recorded By, (t) = Taken By, (c) = Cosigned By    Initials Name Provider Type    Sumit Castro PT Physical Therapist        Outcome Measures     Row Name 02/03/21 1530          How much help from another person do you currently need...    Turning from your back to your side while in flat bed without using bedrails?  3  -MS     Moving from lying on back to sitting on the side of a flat bed without bedrails?  3  -MS     Moving to and from a bed to a chair (including a wheelchair)?  3  -MS     Standing up from a chair using your arms (e.g., wheelchair, bedside chair)?  3  -MS     Climbing 3-5 steps with a railing?  3  -MS     To walk in hospital room?  3  -MS     AM-PAC 6 Clicks Score (PT)  18  -MS     Row Name 02/03/21 1530          Functional Assessment    Outcome Measure Options  AM-PAC 6 Clicks Basic Mobility (PT)  -MS       User Key  (r) = Recorded By, (t) = Taken By, (c) = Cosigned By    Initials Name Provider Type    Sumit Castro PT Physical Therapist        Physical Therapy Education                 Title: PT OT SLP Therapies (Done)      Topic: Physical Therapy (Done)     Point: Mobility training (Done)     Learning Progress Summary           Patient Acceptance, E,D, VU,NR by MS at 2/3/2021 1531                   Point: Home exercise program (Done)     Learning Progress Summary           Patient Acceptance, E,D, VU,NR by MS at 2/3/2021 1531                   Point: Body mechanics (Done)     Learning Progress Summary           Patient Acceptance, E,D, VU,NR by MS at 2/3/2021 1531                   Point: Precautions (Done)     Learning Progress Summary           Patient Acceptance, E,D, VU,NR by MS at 2/3/2021 1531                               User Key     Initials Effective Dates Name Provider Type Discipline    MS 04/03/18 -  Sumit Lee, PT Physical Therapist PT              PT Recommendation and Plan  Planned Therapy Interventions (PT): balance training, bed mobility training, gait training, home exercise program, patient/family education, postural re-education, transfer training, strengthening  Plan of Care Reviewed With: patient  Outcome Summary: Pt. is a 80 year old Male admitted to the hospital with general weakness and an acute G.I. Bleed. Pt. reports that prior to admission he was independent with functional mobility. Pt. currently presents with decreased strength, decreased balance, and decreased tolerance to functional activity.  Pt. will benefit from skilled inpt. P.T. to address his functional deficits and to assist pt. in regaining his maximum level of independence with functional mobility.     Time Calculation:   PT Charges     Row Name 02/03/21 1534             Time Calculation    Start Time  1327  -MS      Stop Time  1351  -MS      Time Calculation (min)  24 min  -MS      PT Received On  02/03/21  -MS      PT - Next Appointment  02/04/21  -MS      PT Goal Re-Cert Due Date  02/10/21  -MS         Time Calculation- PT    Total Timed Code Minutes- PT  23 minute(s)  -MS        User Key  (r) = Recorded By, (t) = Taken By, (c) =  Cosigned By    Initials Name Provider Type    Sumit Castro, PT Physical Therapist        Therapy Charges for Today     Code Description Service Date Service Provider Modifiers Qty    21470388941 HC PT EVAL MOD COMPLEXITY 1 2/3/2021 Sumit Lee, PT GP 1    55347902783 HC PT THER PROC EA 15 MIN 2/3/2021 Sumit Lee, PT GP 2          PT G-Codes  Outcome Measure Options: AM-PAC 6 Clicks Basic Mobility (PT)  AM-PAC 6 Clicks Score (PT): 18    Sumit Lee, PT  2/3/2021

## 2021-02-04 NOTE — PROGRESS NOTES
Continued Stay Note  [facility]     Patient Name: Dustin Villeda  MRN: [id number]  Today's Date: 2/4/2021    Admit Date: 1/30/2021    Discharge Plan     Row Name 02/04/21 0909       Plan    Plan Comments  DC order and orders for HH  noted.  Pt states he has used HH in the past and would like to use the same agency.  Call to [facility]/ [facility] who states they have provided services to pt abt a year ago.  They approved pt and will followup with him.    Final Note  DC home with spouse and [facility] .........sk        Discharge Codes    No documentation.       Expected Discharge Date and Time     Expected Discharge Date Expected Discharge Time    Feb 4, 2021             Nayely Hdz RN

## 2021-02-04 NOTE — PROGRESS NOTES
Hospital Follow Up    LOS:  LOS: 5 days   Patient Name: Dustin Villeda  Age/Sex: 80 y.o. male  : 1940  MRN: [id number]    Day of Service: 21   Length of Stay: 5  Encounter Provider: MARGAUX Brown  Place of Service: [facility] CARDIOLOGY  Patient Care Team:  Mal Barboza MD as PCP - General (Internal Medicine)    Subjective:     Chief Complaint: afib/soa    Interval History: short of air at times but overall feeling better.     Objective:     Objective:  Temp:  [98 °F (36.7 °C)-98.5 °F (36.9 °C)] 98.1 °F (36.7 °C)  Heart Rate:  [100-127] 127  Resp:  [16-22] 20  BP: (104-131)/(60-91) 131/91     Intake/Output Summary (Last 24 hours) at 2021 1019  Last data filed at 2021 0505  Gross per 24 hour   Intake 600 ml   Output 200 ml   Net 400 ml     Body mass index is 23.8 kg/m².      21  1521 21  0500 21  0505   Weight: 70.5 kg (155 lb 6.8 oz) 72.2 kg (159 lb 3.2 oz) 71 kg (156 lb 8.4 oz)     Weight change: 0.5 kg (1 lb 1.6 oz)    Physical Exam:   General Appearance:    Awake alert and oriented in no acute distress.   Color:  Skin:  Neuro:  HEENT:    Lungs:     Pink  Warm and dry  No focal, motor or sensory deficits  Neck supple, pupils equal, round and reactive. No JVD, No Bruit  Clear to auscultation,respirations regular, even and                  unlabored    Heart:    Regular rate and rhythm, S1 and S2, no murmur, no gallop, no rub. No edema, DP/PT pulses are 2+   Chest Wall:    No abnormalities observed   Abdomen:     Normal bowel sounds, no masses, no organomegaly, soft        non-tender, non-distended, no guarding, no ascites noted   Extremities:   Moves all extremities well, no edema, no cyanosis, no redness       Lab Review:   Results from last 7 days   Lab Units 21  0813 21  0506 21  0525 21  0731   SODIUM mmol/L 138 139 141 138   POTASSIUM mmol/L 4.0 3.6 3.8 5.5*   CHLORIDE mmol/L 105 107 108* 102   CO2  mmol/L 20.7* 19.0* 20.0* 19.7*   BUN mg/dL 31* 51* 75* 101*   CREATININE mg/dL 1.22 1.36* 1.56* 2.03*   GLUCOSE mg/dL 136* 126* 123* 171*   CALCIUM mg/dL 8.5* 8.6 8.7 9.0   AST (SGOT) U/L  --   --  29 38   ALT (SGPT) U/L  --   --  5 7     Results from last 7 days   Lab Units 01/31/21  0525 01/30/21  0731   TROPONIN T ng/mL 0.011 0.026     Results from last 7 days   Lab Units 02/04/21  0540 02/03/21  0813   WBC 10*3/mm3 8.44 9.85   HEMOGLOBIN g/dL 9.5* 9.6*   HEMATOCRIT % 29.0* 29.5*   PLATELETS 10*3/mm3 187 197     Results from last 7 days   Lab Units 01/31/21  0525 01/30/21  2312  01/30/21  0732   INR  1.18* 1.30*   < > >10.00*   APTT seconds  --   --   --  59.6*    < > = values in this interval not displayed.     Results from last 7 days   Lab Units 01/31/21  0525 01/30/21  0731   MAGNESIUM mg/dL 2.0 2.1           Invalid input(s): LDLCALC  Results from last 7 days   Lab Units 01/31/21  0525 01/30/21  0731   PROBNP pg/mL 5,967.0* 4,595.0*         I reviewed the patient's new clinical results.  I personally viewed and interpreted the patient's EKG  Current Medications:   Scheduled Meds:allopurinol, 100 mg, Oral, Daily  amantadine, 100 mg, Oral, Q12H  apixaban, 5 mg, Oral, Q12H  budesonide-formoterol, 2 puff, Inhalation, BID - RT  carbidopa-levodopa, 1 tablet, Oral, TID  cetirizine, 10 mg, Oral, Daily  ferrous sulfate, 325 mg, Oral, Daily With Breakfast  insulin regular, 0-9 Units, Subcutaneous, 4x Daily AC & at Bedtime  metoprolol tartrate, 25 mg, Oral, Once  metoprolol tartrate, 75 mg, Oral, Q12H  montelukast, 10 mg, Oral, Nightly  pioglitazone, 15 mg, Oral, Daily  pravastatin, 40 mg, Oral, Nightly  sodium chloride, 10 mL, Intravenous, Q12H      Continuous Infusions:     Allergies:  No Known Allergies    Assessment:       Acute gastrointestinal bleeding    Poisoning by warfarin sodium    Anemia    Type 2 diabetes mellitus (CMS/HCC)    Hypertension    Hyperlipidemia    Gout    Parkinson disease (CMS/HCC)     Anxiety    Atrial fibrillation (CMS/HCC)    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)    Poor compliance with medication    Acute kidney injury superimposed on chronic kidney disease (CMS/HCC)    Nosebleed (Resolved)    Melena  1. Chronic atrial fibrillation. Fair rate control on metoprolol.  Previously on warfarin but issues with diet and INR check compliance.  Considering apixaban if cost not an issue.  2. Epistaxis. GI work up unremarkable.  ENT to see.  3. Acute anemia  4. Supratherpeutic INR  5. Coronary artery disease.  History of stent.   6. CKD  7. Hypertension  8. Parkinson's disease         Plan:       Switched over to eliquis. It is affordable. HR a little high at times. I am going to increase his metoprolol to 75mg BID. Changed DC script. He is going home today with his wife. Starting on low dose diuretic. To follow up in our office next week.    MARGAUX Brown  02/04/21  10:19 EST  Electronically signed by MARGAUX Brown, 02/04/21, 10:19 AM EST.

## 2021-02-04 NOTE — DISCHARGE PLACEMENT REQUEST
"Dustin Villeda (80 y.o. Male)     Date of Birth Social Security Number Address Home Phone MRN    1940  [id number]  [place] [id number] [phone] [id number]    Holiness Marital Status          Non-Gnosticism        Admission Date Admission Type Admitting Provider Attending Provider Department, Room/Bed    1/30/21 Emergency Marcelo Rdz MD Rueff, Lawrence J, MD [address], [address]    Discharge Date Discharge Disposition Discharge Destination         Home or Self Care              Attending Provider: Mal Barboza MD    Allergies: No Known Allergies    Isolation: None   Infection: None   Code Status: CPR    Ht: 172.7 cm (68\")   Wt: 71 kg (156 lb 8.4 oz)    Admission Cmt: None   Principal Problem: Acute gastrointestinal bleeding [K92.2]                 Active Insurance as of 1/30/2021     Primary Coverage     Payor Plan Insurance Group Employer/Plan Group    MEDICARE MEDICARE A & B      Payor Plan Address Payor Plan Phone Number Payor Plan Fax Number Effective Dates    [address] [phone]  10/1/2007 - None Entered    [facility] [id number]       Subscriber Name Subscriber Birth Date Member ID       DUSTIN VILLEDA 1940 [id number]           Secondary Coverage     Payor Plan Insurance Group Employer/Plan Group    [facility] SUPP [id number]     Payor Plan Address Payor Plan Phone Number Payor Plan Fax Number Effective Dates    [address]   12/1/2016 - None Entered    [facility] [id number]       Subscriber Name Subscriber Birth Date Member ID       DUSTIN VILLEDA 1940 [id number]                 Emergency Contacts      (Rel.) Home Phone Work Phone Mobile Phone    Angi Villeda (Spouse) [phone] -- --              "

## 2021-02-04 NOTE — PLAN OF CARE
Goal Outcome Evaluation:  Plan of Care Reviewed With: patient  Progress: improving  Outcome Summary: VSS. Patient c/o continuous, dry cough overnight. PRN Guaifenesin cough syrup ordered and given which provided some relief. Patient ambulating with assistance x 1. Plan is for possible discharge home today. Will continue to monitor.      Problem: Fall Injury Risk  Goal: Absence of Fall and Fall-Related Injury  Outcome: Ongoing, Progressing  Intervention: Identify and Manage Contributors to Fall Injury Risk  Recent Flowsheet Documentation  Taken 2/3/2021 2205 by Johana Jorgensen RN  Medication Review/Management: medications reviewed  Taken 2/3/2021 2014 by Johana Jorgensen RN  Medication Review/Management: medications reviewed  Intervention: Promote Injury-Free Environment  Recent Flowsheet Documentation  Taken 2/4/2021 0456 by Johana Jorgensen RN  Safety Promotion/Fall Prevention:   activity supervised   assistive device/personal items within reach   clutter free environment maintained   fall prevention program maintained   nonskid shoes/slippers when out of bed   room organization consistent   safety round/check completed  Taken 2/4/2021 0204 by Johana Jorgensen RN  Safety Promotion/Fall Prevention:   activity supervised   assistive device/personal items within reach   clutter free environment maintained   fall prevention program maintained   nonskid shoes/slippers when out of bed   room organization consistent   safety round/check completed  Taken 2/4/2021 0015 by Johana Jorgensen RN  Safety Promotion/Fall Prevention:   activity supervised   clutter free environment maintained   assistive device/personal items within reach   fall prevention program maintained   nonskid shoes/slippers when out of bed   room organization consistent   safety round/check completed  Taken 2/3/2021 2205 by Johana Jorgensen RN  Safety Promotion/Fall Prevention:   activity supervised   assistive device/personal items within reach   clutter free environment  maintained   fall prevention program maintained   nonskid shoes/slippers when out of bed   room organization consistent   safety round/check completed  Taken 2/3/2021 2014 by Johana Jorgensen RN  Safety Promotion/Fall Prevention:   activity supervised   assistive device/personal items within reach   clutter free environment maintained   fall prevention program maintained   nonskid shoes/slippers when out of bed   room organization consistent   safety round/check completed     Problem: Skin Injury Risk Increased  Goal: Skin Health and Integrity  Outcome: Ongoing, Progressing  Intervention: Optimize Skin Protection  Recent Flowsheet Documentation  Taken 2/4/2021 0456 by Johana Jorgensen RN  Head of Bed (HOB): HOB at 20-30 degrees  Taken 2/4/2021 0204 by Johana Jorgensen RN  Head of Bed (HOB): HOB flat  Taken 2/4/2021 0015 by Johana Jorgensen RN  Pressure Reduction Techniques: frequent weight shift encouraged  Head of Bed (HOB): HOB at 15 degrees  Taken 2/3/2021 2014 by Johana Jorgensen RN  Pressure Reduction Techniques: frequent weight shift encouraged  Head of Bed (HOB): HOB at 30-45 degrees     Problem: Adjustment to Illness (Gastrointestinal Bleeding)  Goal: Optimal Coping with Acute Illness  Outcome: Ongoing, Progressing     Problem: Bleeding (Gastrointestinal Bleeding)  Goal: Hemostasis  Outcome: Ongoing, Progressing

## 2021-02-04 NOTE — DISCHARGE SUMMARY
Date of Discharge:  2/4/2021    Discharge Diagnosis:     #1 Epistaxis-no further bleeding noted.  Negative evaluation by ENT  2.  Anemia-thought to be secondary to his epistaxis.  Stable now with no further signs of bleeding.  He is also been started on oral iron.  His most recent H&H is 9.5/29  3.  Atrial fibrillation-increase in his metoprolol has significantly improved his rate  4.  Coumadin toxicity-the patient was noncompliant with his medications.  Coumadin has been stopped and he has been placed on Eliquis 5 mg twice daily instead.  5.  ASCVD-no complaints of chest pain or other cardiac symptoms  6.  Parkinson's disease-relatively stable on his current medications  7.  CKD 3-stable with a GFR of 57  8.  Diabetes mellitus type 2-generally well controlled as an outpatient and his most recent A1c was good at 6.2  9.  Hypertension-his blood pressures been a little labile but increasing his metoprolol should help that as well.  He seems to be tolerating the metoprolol dose without any difficulty.  He has also been placed back on low-dose Lasix which he is used in the past for edema.  10.  Gout-no complaints on medication  11.  Anxiety/insomnia-he slept poorly in the hospital he should do better at home.  12.  Cough-he has a persistent cough that is been worked up and evaluated by his allergist and treated with multiple medications.  He agrees to outpatient ENT evaluation when things are more stable.  He saw Dr. Arango while in the hospital and hopefully we can arrange for him to see him again as an outpatient as well.            DETAILS OF HOSPITAL STAY     Presenting Problem/History of Present Illness  AARON (acute kidney injury) (CMS/[facility]) [N17.9]  Poisoning by warfarin sodium, accidental or unintentional, initial encounter [T45.511A]  Anemia, unspecified type [D64.9]  Gastrointestinal hemorrhage, unspecified gastrointestinal hemorrhage type [K92.2]  Poisoning by warfarin sodium, accidental or unintentional,  initial encounter [T45.511A]    Acute gastrointestinal bleeding    Poisoning by warfarin sodium    Anemia    Type 2 diabetes mellitus (CMS/HCC)    Hypertension    Hyperlipidemia    Gout    Parkinson disease (CMS/HCC)    Anxiety    Atrial fibrillation (CMS/HCC)    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)    Poor compliance with medication    Acute kidney injury superimposed on chronic kidney disease (CMS/HCC)    Nosebleed (Resolved)    Melena    Past Medical History:   Diagnosis Date   • Ankle fracture     RIGHT ANKLE    • Anxiety     ABOUT CURRENT HEALTH SITUATION    • Atrial fibrillation (CMS/HCC)    • Constipation due to opioid therapy     NO BOWEL MOVEMENT IN 5 DAYS   • Coronary artery disease    • Diabetes mellitus (CMS/HCC)    • Gout     BIG TOES   • Hyperlipidemia    • Hypertension    • Parkinson disease (CMS/HCC)      Past Surgical History:   Procedure Laterality Date   • ANKLE OPEN REDUCTION INTERNAL FIXATION Right 11/12/2019    Procedure: OPEN REDUCTION INTERNAL FIXATION RIGHT ANKLE;  Surgeon: Evgeny Keith II, MD;  Location: [facility] OR;  Service: Orthopedics   • CATARACT EXTRACTION EXTRACAPSULAR W/ INTRAOCULAR LENS IMPLANTATION Bilateral    • COLONOSCOPY  2011   • CORONARY ANGIOPLASTY WITH STENT PLACEMENT  1998   • ENDOSCOPY  1/31/2021    Procedure: ESOPHAGOGASTRODUODENOSCOPY AT BEDSIDE;  Surgeon: Geoff Rosenthal MD;  Location: [facility] ENDOSCOPY;  Service: Gastroenterology;;  PRE OP - BLACK STOOLS  POST OP - CANDIDA   • EXTRACORPOREAL SHOCK WAVE LITHOTRIPSY (ESWL)      OVER  10 YEARS AGO   • PILONIDAL CYSTECTOMY  1964       Allergies  Patient has no known allergies.    Discharge Medications     Discharge Medications      New Medications      Instructions Start Date   Eliquis 5 MG tablet tablet  Generic drug: apixaban   5 mg, Oral, Every 12 Hours Scheduled      apixaban 5 MG tablet tablet  Commonly known as: ELIQUIS   5 mg, Oral, Every 12 Hours  Scheduled      ferrous sulfate 325 (65 FE) MG tablet   325 mg, Oral, Daily With Breakfast         Changes to Medications      Instructions Start Date   furosemide 20 MG tablet  Commonly known as: Lasix  What changed:   · medication strength  · how much to take  · when to take this   20 mg, Oral, Daily      metoprolol tartrate 50 MG tablet  Commonly known as: LOPRESSOR  What changed:   · medication strength  · how much to take  · when to take this   50 mg, Oral, Every 12 Hours Scheduled         Continue These Medications      Instructions Start Date   allopurinol 100 MG tablet  Commonly known as: ZYLOPRIM   100 mg, Oral, Daily      amantadine 100 MG capsule  Commonly known as: SYMMETREL   100 mg, Oral, 2 Times Daily      carbidopa-levodopa  MG per tablet  Commonly known as: SINEMET   1 tablet, Oral, 3 Times Daily      cholecalciferol 25 MCG (1000 UT) tablet  Commonly known as: VITAMIN D3   1,000 Units, Oral, Daily      fluticasone-salmeterol 100-50 MCG/DOSE DISKUS  Commonly known as: ADVAIR   Inhalation, 2 Times Daily - RT      montelukast 10 MG tablet  Commonly known as: SINGULAIR   10 mg, Oral, Nightly      pioglitazone 15 MG tablet  Commonly known as: ACTOS   15 mg, Oral, Daily      pravastatin 40 MG tablet  Commonly known as: PRAVACHOL   40 mg, Oral, Daily      Zetia 10 MG tablet  Generic drug: ezetimibe   10 mg, Oral, Daily             Hospital Course  Patient is a 80 y.o. male presented with fatigue and melena that have been going on for at least 3 days prior to being evaluated in the emergency room.  He had an episode of persistent epistaxis but was treated in the emergency room several days before and was told to stop his Coumadin.  He is also followed up with me in the office and was told the same thing.  Apparently he was confused because he continued to take his Coumadin and his INR continued to be high and he continued to bleed.  In the emergency room he was noted to be anemic with an H&H of 8.5,  his INR was greater than 10, and he was clearly fatigued.  He has atrial fibrillation and his heart rate was about 107 and his blood pressure was a little low at 108/77.  Because of the concerns for his melena and anemia he was mated to the ICU.  For several days in the ICU was evident that the patient was relatively stable.  His INR was reversed with Kcentra.  He was seen and evaluated by GI who did an upper scope which found no evidence for an active bleeding site.  The thought was that he had continued to bleed persistently from his epistaxis because his INR was still high.  The other complicating factor was that the patient apparently has been drinking excessively recently.  That likely contributed to the significant elevation in his INR as well.    Once the Coumadin effects were reversed and his INR came down to normal he stopped bleeding, his H&H stabilized, cardiology saw the patient and worked with controlling his atrial fibrillation.  I suggested putting him on Eliquis instead of Coumadin.  The only issue there was the cost and the family is decided to pay the significantly increased cost of the Eliquis for now.  The patient is now on 50 mg of metoprolol twice a day and his heart rate is much better controlled, he seems to be tolerating his Eliquis well, and he has a history of escrow to cardiovascular disease but no cardiac symptoms from that standpoint.  At this point his heart rate is relatively well controlled, he is tolerating his medications well, and generally he seems stable.  He is still anemic but his H&H is stable and he has been started on oral iron.  He has been seen and evaluated by physical therapy who feels that he is weak and his balance is poor so work and arrange for home health to see the patient for outpatient physical therapy.    The only other issue during this hospitalization is been his blood pressures been a little unstable.  Initially he was taken off of his Lasix by restarted  that.  That should help with his blood pressure along with the increased dose of metoprolol.  Fortunately his other medical problems have stayed under relatively good control.  His diabetes has been stable and her hemoglobin A1c came back at 6.2 indicating overall very good control, his Parkinson's disease seems to be stable as well and may actually be better now that he has not been drinking so much, and he said no complaints related to his gout.  He does have a persistent cough that is been worked up and evaluated as well as treated by his allergist.  Unfortunately that has bothered him a fair amount in the hospital.  He agrees to an outpatient ENT evaluation once things are a little more stable.  He also has high cholesterol that is well controlled with Pravachol.  Up until this episode he is generally been very stable and doing well as an outpatient.  He is weak and debilitated at this point I think he will benefit from physical therapy through home health.    He will follow up with me in the next 7 to 10 days and with cardiology as they arrange.    Procedures Performed  Procedure(s):  ESOPHAGOGASTRODUODENOSCOPY AT BEDSIDE       Consults:   Consults     Date and Time Order Name Status Description    2/3/2021 0751 Inpatient ENT Consult Completed     1/31/2021 0154 Inpatient Cardiology Consult Completed     1/30/2021 0845 Pulmonology (on-call MD unless specified) Completed     1/30/2021 0845 Gastroenterology (on-call MD unless specified) Completed     1/26/2021 1412 Family Medicine Consult Completed           Pertinent Test Results:   WBC 8.4, hemoglobin 9.5, hematocrit 29.0, platelet count 187K  Blood sugar 136, BUN 31, creatinine 1.2, potassium 4.0, sodium 138, GFR 57    Hemoglobin A1c 6.2  proBNP 5967  Uric acid 6.3, magnesium 2.0    Blood type-O-        Condition on Discharge:    At this point the patient is awake, alert, and seems to be much more stable in general.  He is not complaining of chest pain or  shortness of breath.  He does not have any significant palpitations related to his atrial fibrillation.  He seems to be tolerating his medications without any difficulty.  He is eating and drinking without any difficulty.  As noted he is weak and debilitated and physical therapy felt like his balance was also compromised.  As noted he is agreeable to physical therapy from home health.  He understands that his Coumadin has been stopped and he is now on Eliquis instead.  I had discussions with his wife about helping him with his medications because he did seem to get a little confused.  She is much more involved now than she has been in the past.  He has also been instructed not to use alcohol while on anticoagulants.  He indicates an intention to stop drinking altogether.  His wife is certainly in support of that.  His home medications have been restarted with the only change being I reduce the dose of his Lasix from 40 mg to 20 mg, started him on oral iron, he is also on Eliquis 5 mg twice a day now.  His metoprolol dose was increased from 25 mg twice a day to 50 mg twice a day and that seems to be working.  Otherwise he has no new complaints or problems.  His cough is persistent and troublesome and I think an outpatient ENT evaluation is a good idea.  He should probably also follow-up with his allergist as well.    This point his atrial fibrillation is running around 100, his blood pressure is stable, his lungs are clear, he has no abdominal pain, no peripheral edema, and overall his Parkinson's disease seems to be relatively stable.  He clearly is weak and tired.  He has been sleeping poorly in the hospital and I think that is part of the problem.  I think he will do better at home.  He has been instructed to see me in the next 7 to 10 days, he will follow up with cardiology as they arrange, and he should call me if he has any questions or problems.      Vital Signs   Temp:  [98 °F (36.7 °C)-98.5 °F (36.9 °C)]  "98.1 °F (36.7 °C)  Heart Rate:  [100-117] 103  Resp:  [16-22] 18  BP: (104-131)/(60-91) 131/91    Flowsheet Rows      First Filed Value   Admission Height  172.7 cm (68\") Documented at 01/30/2021 0605   Admission Weight  72.1 kg (159 lb) Documented at 01/30/2021 0605              Discharge Disposition  Home or Self Care        Discharge Diet:   Diet Instructions     Diet: Consistent Carbohydrate, Cardiac      Discharge Diet:  Consistent Carbohydrate  Cardiac             Activity at Discharge:   Activity Instructions     Activity as Tolerated            Follow-up Appointments  No future appointments.  Additional Instructions for the Follow-ups that You Need to Schedule     Discharge Follow-up with PCP   As directed       Currently Documented PCP:    Mal Barboza MD    PCP Phone Number:    [phone]     Follow Up Details: See me in the office in the next 7 to 10 days               Test Results Pending at Discharge       Mal Barboza MD  02/04/21  07:47 EST          "

## 2021-02-05 NOTE — PROGRESS NOTES
Case Management Discharge Note      Final Note: DC home with spouse and [facility]  .........sk    Provided Post Acute Provider List?: N/A  Provided Post Acute Provider Quality & Resource List?: N/A    Selected Continued Care - Discharged on 2/4/2021 Admission date: 1/30/2021 - Discharge disposition: Home or Self Care    Destination    No services have been selected for the patient.              Durable Medical Equipment    No services have been selected for the patient.              Dialysis/Infusion    No services have been selected for the patient.              Home Medical Care Coordination complete    Service Provider Selected Services Address Phone Fax Patient Preferred    [facility] [place]  [facility] [address] [place] [id number] [phone] --          Therapy    No services have been selected for the patient.              Community Resources    No services have been selected for the patient.                       Final Discharge Disposition Code: 06 - home with home health care([facility] )

## 2021-02-05 NOTE — OUTREACH NOTE
Prep Survey      Responses   [facility] patient discharged from?  [place]   Is LACE score < 7 ?  No   Emergency Room discharge w/ pulse ox?  No   Eligibility  Readm Mgmt   Discharge diagnosis  Epistaxis, anemia, atrial fibrillation, coumadin toxicity [s/p EGD at bedside]   Does the patient have one of the following disease processes/diagnoses(primary or secondary)?  Other   Does the patient have Home health ordered?  Yes   What is the Home health agency?   [facility]   Is there a DME ordered?  No   Comments regarding appointments  Needs f/u scheduled   Medication alerts for this patient  start eliquis   Prep survey completed?  Yes          Luz Ospina RN

## 2021-02-08 NOTE — OUTREACH NOTE
Medical Week 1 Survey      Responses   [facility] patient discharged from?  [place]   Does the patient have one of the following disease processes/diagnoses(primary or secondary)?  Other   Week 1 attempt successful?  Yes   Call start time  1527   Call end time  1547   Discharge diagnosis  Epistaxis, anemia, atrial fibrillation, coumadin toxicity   Person spoke with today (if not patient) and relationship  Maragret-spouse    Meds reviewed with patient/caregiver?  Yes   Is the patient having any side effects they believe may be caused by any medication additions or changes?  No   Does the patient have all medications ordered at discharge?  Yes   Is the patient taking all medications as directed (includes completed medication regime)?  Yes   Medication comments  Pt didn't receive the metoprolol from  pharmacy however PCP called in script to [facility] and patient has that one.    Comments regarding appointments  Appt with cardiology on 2/11/21   Does the patient have a primary care provider?   Yes   Does the patient have an appointment with their PCP within 7 days of discharge?  Yes   Comments regarding PCP  2/11/21   Has the patient kept scheduled appointments due by today?  N/A   What is the Home health agency?   [facility]   Has home health visited the patient within 72 hours of discharge?  Yes   Psychosocial issues?  No   Did the patient receive a copy of their discharge instructions?  Yes   Nursing interventions  Reviewed instructions with patient   What is the patient's perception of their health status since discharge?  Improving [Pt is not sleeping well ]   Is the patient/caregiver able to teach back signs and symptoms related to disease process for when to call PCP?  Yes   Is the patient/caregiver able to teach back signs and symptoms related to disease process for when to call 911?  Yes   Is the patient/caregiver able to teach back the hierarchy of who to call/visit for  symptoms/problems? PCP, Specialist, Home health nurse, Urgent Care, ED, 911  Yes   If the patient is a current smoker, are they able to teach back resources for cessation?  Not a smoker   Week 1 call completed?  Yes          Lona Schwab RN

## 2021-02-14 PROBLEM — R00.1 BRADYCARDIA: Status: ACTIVE | Noted: 2021-01-01

## 2021-02-14 PROBLEM — R27.0 ATAXIA: Status: ACTIVE | Noted: 2021-01-01

## 2021-02-14 PROBLEM — R53.1 GENERALIZED WEAKNESS: Status: ACTIVE | Noted: 2021-01-01

## 2021-02-14 PROBLEM — I51.7 CARDIOMEGALY: Status: ACTIVE | Noted: 2021-01-01

## 2021-02-14 PROBLEM — E86.9 VOLUME DEPLETION: Status: ACTIVE | Noted: 2021-01-01

## 2021-02-14 PROBLEM — R77.8 ELEVATED TROPONIN: Status: ACTIVE | Noted: 2021-01-01

## 2021-02-14 NOTE — ED NOTES
Patient has on mask, nurse has on mask goggles and face shield.      Sandra Bell, RN  02/14/21 4032

## 2021-02-14 NOTE — ED PROVIDER NOTES
EMERGENCY DEPARTMENT ENCOUNTER    Room Number:  S423/1  PCP: Mal Barboza MD  Historian: Patient  History Limited By: Nothing      HPI  Chief Complaint: Weakness  Context: Dustin Villeda is a 80 y.o. male who presents to the ED c/o weakness.  Patient states he has had weakness for 3 weeks.  States is gotten progressively worse to the point of today he is unable to ambulate.  Patient has had chronic cough.  Patient states weakness is generalized and not focal.  Has had no slurred speech or vision issues.  Patient states he is no more short of breath than normal.  Has had no fevers.  No vomiting or diarrhea.      Location: Generalized  Radiation: None  Character: Weakness  Duration: 3 weeks  Severity: Moderate  Progression: Worsening  Aggravating Factors: Walking  Alleviating Factors: Remaining still        MEDICAL RECORD REVIEW    Patient was admitted here from January 30 through February 4          PAST MEDICAL HISTORY  Active Ambulatory Problems     Diagnosis Date Noted   • Closed right ankle fracture 11/12/2019   • Poisoning by warfarin sodium 01/30/2021   • Anemia 01/30/2021   • Type 2 diabetes mellitus (CMS/HCC)    • Hypertension    • Hyperlipidemia    • Gout    • Parkinson disease (CMS/HCC)    • Anxiety    • Atrial fibrillation (CMS/HCC)    • 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)    • Poor compliance with medication    • Acute kidney injury superimposed on chronic kidney disease (CMS/HCC)    • Acute gastrointestinal bleeding    • Nosebleed (Resolved)    • Melena      Resolved Ambulatory Problems     Diagnosis Date Noted   • No Resolved Ambulatory Problems     Past Medical History:   Diagnosis Date   • Ankle fracture    • Constipation due to opioid therapy    • Coronary artery disease    • Diabetes mellitus (CMS/HCC)    • Iron deficiency anemia    • Supratherapeutic INR          PAST SURGICAL HISTORY  Past Surgical History:   Procedure Laterality Date   •  ANKLE OPEN REDUCTION INTERNAL FIXATION Right 11/12/2019    Procedure: OPEN REDUCTION INTERNAL FIXATION RIGHT ANKLE;  Surgeon: Evgeny Keith II, MD;  Location: [facility] MAIN OR;  Service: Orthopedics   • CATARACT EXTRACTION EXTRACAPSULAR W/ INTRAOCULAR LENS IMPLANTATION Bilateral    • COLONOSCOPY  2011   • CORONARY ANGIOPLASTY WITH STENT PLACEMENT  1998   • ENDOSCOPY  1/31/2021    Procedure: ESOPHAGOGASTRODUODENOSCOPY AT BEDSIDE;  Surgeon: Geoff Rosenthal MD;  Location: [facility] ENDOSCOPY;  Service: Gastroenterology;;  PRE OP - BLACK STOOLS  POST OP - CANDIDA   • EXTRACORPOREAL SHOCK WAVE LITHOTRIPSY (ESWL)      OVER  10 YEARS AGO   • PILONIDAL CYSTECTOMY  1964         FAMILY HISTORY  Family History   Problem Relation Age of Onset   • Malig Hyperthermia Neg Hx          SOCIAL HISTORY  Social History     Socioeconomic History   • Marital status:      Spouse name: Not on file   • Number of children: Not on file   • Years of education: Not on file   • Highest education level: Not on file   Tobacco Use   • Smoking status: Former Smoker   • Smokeless tobacco: Never Used   Substance and Sexual Activity   • Alcohol use: Yes     Alcohol/week: 3.0 standard drinks     Types: 3 Shots of liquor per week     Comment: burbon   • Drug use: No   • Sexual activity: Defer         ALLERGIES  Patient has no known allergies.        REVIEW OF SYSTEMS  Review of Systems   Constitutional: Negative for activity change, appetite change and fever.   HENT: Negative for congestion and sore throat.    Eyes: Negative.    Respiratory: Positive for cough. Negative for shortness of breath.    Cardiovascular: Negative for chest pain and leg swelling.   Gastrointestinal: Negative for abdominal pain, diarrhea and vomiting.   Endocrine: Negative.    Genitourinary: Negative for decreased urine volume and dysuria.   Musculoskeletal: Negative for neck pain.   Skin: Negative for rash and wound.   Allergic/Immunologic: Negative.     Neurological: Positive for weakness. Negative for numbness and headaches.   Hematological: Negative.    Psychiatric/Behavioral: Negative.    All other systems reviewed and are negative.           PHYSICAL EXAM  ED Triage Vitals [02/14/21 1318]   Temp Heart Rate Resp BP SpO2   96.9 °F (36.1 °C) (!) 48 16 101/60 95 %      Temp src Heart Rate Source Patient Position BP Location FiO2 (%)   Tympanic Monitor Sitting Left arm --       Physical Exam   Constitutional: He is oriented to person, place, and time. No distress.   HENT:   Head: Normocephalic and atraumatic.   Eyes: Pupils are equal, round, and reactive to light. EOM are normal.   Neck: Normal range of motion. Neck supple.   Cardiovascular: Regular rhythm and normal heart sounds.   Bradycardic   Pulmonary/Chest: Effort normal and breath sounds normal. No respiratory distress.   Abdominal: Soft. There is no abdominal tenderness. There is no rebound and no guarding.   Musculoskeletal: Normal range of motion.         General: No edema.   Neurological: He is alert and oriented to person, place, and time. He has normal sensation and normal strength.   Skin: Skin is warm and dry.   Psychiatric: Mood and affect normal.   Nursing note and vitals reviewed.    Patient was wearing a face mask when I entered the room and they continued to wear a mask throughout their stay in the ED.  I wore PPE, including gloves, face mask with shield or face mask with goggles whenever I was in the room with patient.       LAB RESULTS  Recent Results (from the past 24 hour(s))   ECG 12 Lead    Collection Time: 02/14/21  1:53 PM   Result Value Ref Range    QT Interval 544 ms   Comprehensive Metabolic Panel    Collection Time: 02/14/21  2:28 PM    Specimen: Blood   Result Value Ref Range    Glucose 147 (H) 65 - 99 mg/dL    BUN 31 (H) 8 - 23 mg/dL    Creatinine 2.44 (H) 0.76 - 1.27 mg/dL    Sodium 139 136 - 145 mmol/L    Potassium 4.3 3.5 - 5.2 mmol/L    Chloride 100 98 - 107 mmol/L    CO2 22.8  22.0 - 29.0 mmol/L    Calcium 9.6 8.6 - 10.5 mg/dL    Total Protein 6.3 6.0 - 8.5 g/dL    Albumin 4.00 3.50 - 5.20 g/dL    ALT (SGPT) <5 1 - 41 U/L    AST (SGOT) 34 1 - 40 U/L    Alkaline Phosphatase 207 (H) 39 - 117 U/L    Total Bilirubin 1.1 0.0 - 1.2 mg/dL    eGFR Non African Amer 26 (L) >60 mL/min/1.73    Globulin 2.3 gm/dL    A/G Ratio 1.7 g/dL    BUN/Creatinine Ratio 12.7 7.0 - 25.0    Anion Gap 16.2 (H) 5.0 - 15.0 mmol/L   Troponin    Collection Time: 02/14/21  2:28 PM    Specimen: Blood   Result Value Ref Range    Troponin T 0.055 (C) 0.000 - 0.030 ng/mL   Magnesium    Collection Time: 02/14/21  2:28 PM    Specimen: Blood   Result Value Ref Range    Magnesium 1.8 1.6 - 2.4 mg/dL   Light Blue Top    Collection Time: 02/14/21  2:28 PM   Result Value Ref Range    Extra Tube hold for add-on    Green Top (Gel)    Collection Time: 02/14/21  2:28 PM   Result Value Ref Range    Extra Tube Hold for add-ons.    Lavender Top    Collection Time: 02/14/21  2:28 PM   Result Value Ref Range    Extra Tube hold for add-on    Gold Top - SST    Collection Time: 02/14/21  2:28 PM   Result Value Ref Range    Extra Tube Hold for add-ons.    CBC Auto Differential    Collection Time: 02/14/21  2:28 PM    Specimen: Blood   Result Value Ref Range    WBC 8.90 3.40 - 10.80 10*3/mm3    RBC 3.61 (L) 4.14 - 5.80 10*6/mm3    Hemoglobin 11.6 (L) 13.0 - 17.7 g/dL    Hematocrit 37.2 (L) 37.5 - 51.0 %    .0 (H) 79.0 - 97.0 fL    MCH 32.1 26.6 - 33.0 pg    MCHC 31.2 (L) 31.5 - 35.7 g/dL    RDW 16.5 (H) 12.3 - 15.4 %    RDW-SD 60.2 (H) 37.0 - 54.0 fl    MPV 9.3 6.0 - 12.0 fL    Platelets 248 140 - 450 10*3/mm3    Neutrophil % 70.6 42.7 - 76.0 %    Lymphocyte % 16.0 (L) 19.6 - 45.3 %    Monocyte % 10.8 5.0 - 12.0 %    Eosinophil % 1.6 0.3 - 6.2 %    Basophil % 0.6 0.0 - 1.5 %    Immature Grans % 0.4 0.0 - 0.5 %    Neutrophils, Absolute 6.29 1.70 - 7.00 10*3/mm3    Lymphocytes, Absolute 1.42 0.70 - 3.10 10*3/mm3    Monocytes, Absolute 0.96  (H) 0.10 - 0.90 10*3/mm3    Eosinophils, Absolute 0.14 0.00 - 0.40 10*3/mm3    Basophils, Absolute 0.05 0.00 - 0.20 10*3/mm3    Immature Grans, Absolute 0.04 0.00 - 0.05 10*3/mm3    nRBC 0.4 (H) 0.0 - 0.2 /100 WBC   Urinalysis With Microscopic If Indicated (No Culture) - Urine, Clean Catch    Collection Time: 02/14/21  3:35 PM    Specimen: Urine, Clean Catch   Result Value Ref Range    Color, UA Dark Yellow (A) Yellow, Straw    Appearance, UA Cloudy (A) Clear    pH, UA <=5.0 5.0 - 8.0    Specific Gravity, UA 1.022 1.005 - 1.030    Glucose, UA Negative Negative    Ketones, UA Trace (A) Negative    Bilirubin, UA Negative Negative    Blood, UA Trace (A) Negative    Protein,  mg/dL (2+) (A) Negative    Leuk Esterase, UA Negative Negative    Nitrite, UA Negative Negative    Urobilinogen, UA 1.0 E.U./dL 0.2 - 1.0 E.U./dL   Urinalysis, Microscopic Only - Urine, Clean Catch    Collection Time: 02/14/21  3:35 PM    Specimen: Urine, Clean Catch   Result Value Ref Range    RBC, UA 3-5 (A) None Seen, 0-2 /HPF    WBC, UA 0-2 None Seen, 0-2 /HPF    Bacteria, UA Trace (A) None Seen /HPF    Squamous Epithelial Cells, UA 3-6 (A) None Seen, 0-2 /HPF    Hyaline Casts, UA 0-2 None Seen /LPF    Sperm, UA Occasional (A) None Seen /HPF    Mucus, UA Trace None Seen, Trace /HPF    Methodology Manual Light Microscopy        Ordered the above labs and reviewed the results.        RADIOLOGY  XR Chest 1 View   Final Result   Minimal likely scarring or atelectasis in the left lower   lung. Cardiomegaly.  Follow-up as clinical indications persist.       This report was finalized on 2/14/2021 3:18 PM by Dr. Mason Henning M.D.               Ordered the above noted radiological studies. Reviewed by me in PACS.           PROCEDURES  Procedures      EKG:          EKG time: 1353  Rhythm/Rate: Sinus bradycardia 49  P waves and [place]: Normal P waves  QRS, axis: Incomplete right bundle branch block  ST and T waves: Nonspecific ST-T  waves    Interpreted Contemporaneously by me, independently viewed  Changed compared to prior 1/30/2021.  A. fib has resolved        MEDICATIONS GIVEN IN ER  Medications   sodium chloride 0.9 % flush 10 mL (has no administration in time range)             PROGRESS AND CONSULTS  ED Course as of Feb 14 2028   Sun Feb 14, 2021   1630 16:30 EST  Patient presents for evaluation of weakness.  Patient does have worsening renal insufficiency.  Troponin is elevated but I think this is probably from no insufficiency as he had no chest pain or pressure.  Patient is bradycardic as well.  Patient has no evidence of post renal obstruction as post void residual is 0.  Patient discussed with Dr. Medina who will admit for Dr. Barboza.    [SL]      ED Course User Index  [SL] George Taylor MD           MEDICAL DECISION MAKING      MDM  Number of Diagnoses or Management Options  Bradycardia:   Elevated troponin:   Generalized weakness:   Renal insufficiency:      Amount and/or Complexity of Data Reviewed  Clinical lab tests: reviewed and ordered (Creatinine 2.4)  Tests in the radiology section of CPT®: reviewed and ordered (Chest x-ray negative)  Decide to obtain previous medical records or to obtain history from someone other than the patient: yes  Discuss the patient with other providers: yes (Discussed with Dr. Medina who will admit)               DIAGNOSIS  Final diagnoses:   Generalized weakness   Renal insufficiency   Elevated troponin   Bradycardia           DISPOSITION  admit        Latest Documented Vital Signs:  As of 20:28 EST  BP- 118/55 HR- 60 Temp- 97.5 °F (36.4 °C) (Oral) O2 sat- 95%                       George Taylor MD  02/14/21 2029

## 2021-02-14 NOTE — ED NOTES
Bladder scan after patient urinated,patient had 0mL in bladder.      Sandra Bell, RN  02/14/21 3706

## 2021-02-15 NOTE — PLAN OF CARE
Goal Outcome Evaluation:  Plan of Care Reviewed With: patient     Outcome Summary: Pt is 80 y.o. male admitted to [facility] with weakness and AARON, bradycardia, and difficulty walking. Pt reports living with with spouse and indep with ADLs PTA. Pt presented with adequate strength this date and fair functional activity tolerance. Pt able to perform LB dressing, donning/doffing B socks with SBA. Pt demo'd ability to ambulate from bed <> sinkside with CGA and cane. Pt demo'd mild unsteadiness throughout, but balance improved with increased time on feet. Pt would benefit from skilled OT services while admitted acutely, anticipating d/c home with wife when medically appropriate.      Patient was placed in a face mask during this therapy encounter. Therapist used appropriate personal protective equipment including surgical mask, eye shield and gloves during the entire therapy session. Hand hygiene was completed before and after therapy session. Patient is not in enhanced droplet precautions.

## 2021-02-15 NOTE — PLAN OF CARE
Goal Outcome Evaluation:  Plan of Care Reviewed With: patient  Progress: improving  Outcome Summary: Pleasant pt, admitted w/ weakness and AARON. HR bradycardic, 50-60's. Otherwise VSS. A/Ox4. C/o nausea, pt refuses Zofran. Ambulated to bathroom w/ stand-by assist. No acute changes, wctm.     Cardiology and Nephrology consults called in.

## 2021-02-15 NOTE — THERAPY EVALUATION
Patient Name: Dustin Villeda  : 1940    MRN: [id number]                              Today's Date: 2/15/2021       Admit Date: 2021    Visit Dx:     ICD-10-CM ICD-9-CM   1. Generalized weakness  R53.1 780.79   2. Renal insufficiency  N28.9 593.9   3. Elevated troponin  R77.8 790.6   4. Bradycardia  R00.1 427.89     Patient Active Problem List   Diagnosis   • Closed right ankle fracture   • Poisoning by warfarin sodium   • Anemia   • Type 2 diabetes mellitus (CMS/HCC)   • Hypertension   • Hyperlipidemia   • Gout   • Parkinson disease (CMS/HCC)   • Anxiety   • Atrial fibrillation (CMS/HCC)   • 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)   • Poor compliance with medication   • Acute kidney injury superimposed on chronic kidney disease (CMS/HCC)   • Acute gastrointestinal bleeding   • Nosebleed (Resolved)   • Melena   • Generalized weakness   • Elevated troponin   • Ataxia   • Volume depletion   • Bradycardia   • Cardiomegaly     Past Medical History:   Diagnosis Date   • Ankle fracture     RIGHT ANKLE    • Anxiety     ABOUT CURRENT HEALTH SITUATION    • Atrial fibrillation (CMS/HCC)    • Constipation due to opioid therapy     NO BOWEL MOVEMENT IN 5 DAYS   • Coronary artery disease    • Diabetes mellitus (CMS/HCC)    • Gout     BIG TOES   • Hyperlipidemia    • Hypertension    • Iron deficiency anemia    • Parkinson disease (CMS/HCC)    • Supratherapeutic INR      Past Surgical History:   Procedure Laterality Date   • ANKLE OPEN REDUCTION INTERNAL FIXATION Right 2019    Procedure: OPEN REDUCTION INTERNAL FIXATION RIGHT ANKLE;  Surgeon: Evgeny Keith II, MD;  Location: [facility];  Service: Orthopedics   • CATARACT EXTRACTION EXTRACAPSULAR W/ INTRAOCULAR LENS IMPLANTATION Bilateral    • COLONOSCOPY     • CORONARY ANGIOPLASTY WITH STENT PLACEMENT     • ENDOSCOPY  2021    Procedure: ESOPHAGOGASTRODUODENOSCOPY AT BEDSIDE;  Surgeon:  Geoff Rosenthal MD;  Location: [facility] ENDOSCOPY;  Service: Gastroenterology;;  PRE OP - BLACK STOOLS  POST OP - CANDIDA   • EXTRACORPOREAL SHOCK WAVE LITHOTRIPSY (ESWL)      OVER  10 YEARS AGO   • PILONIDAL CYSTECTOMY  1964     General Information     Row Name 02/15/21 1407          OT Time and Intention    Document Type  evaluation  -SOHAM     Mode of Treatment  individual therapy;occupational therapy  -SOHAM     Row Name 02/15/21 1407          General Information    Patient Profile Reviewed  yes  -SOHAM     Prior Level of Function  independent:;ADL's reports using cane for functional mobility tasks  -SOHAM     Existing Precautions/Restrictions  no known precautions/restrictions  -SOHAM     Barriers to Rehab  none identified  -SOHAM     Row Name 02/15/21 1407          Living Environment    Lives With  spouse  -SOHAM     Row Name 02/15/21 1407          Cognition    Orientation Status (Cognition)  oriented x 3  -SOHAM     Row Name 02/15/21 1407          Safety Issues, Functional Mobility    Impairments Affecting Function (Mobility)  balance  -SOHAM       User Key  (r) = Recorded By, (t) = Taken By, (c) = Cosigned By    Initials Name Provider Type    SOHAM Donovan Kent, OT Occupational Therapist          Mobility/ADL's     Row Name 02/15/21 1411          Bed Mobility    Bed Mobility  bed mobility (all) activities  -SOHAM     All Activities, [place] (Bed Mobility)  contact guard assist;verbal cues  -SOHAM     Assistive Device (Bed Mobility)  bed rails;head of bed elevated  -SOHAM     Row Name 02/15/21 1411          Transfers    Transfers  sit-stand transfer  -SOHAM     Sit-Stand [place] (Transfers)  contact guard  -SOHAM     Row Name 02/15/21 1411          Sit-Stand Transfer    Assistive Device (Sit-Stand Transfers)  -- personal cane  -SOHAM     Row Name 02/15/21 1411          Functional Mobility    Functional Mobility- Ind. Level  contact guard assist  -SOHAM     Functional Mobility- Device  other (see comments) cane  -SOHAM     Functional Mobility- Comment   ambulated from bed <> sinkside with CGA and cane, mildly unsteady.  -SOHAM     Row Name 02/15/21 1411          Activities of Daily Living    BADL Assessment/Intervention  lower body dressing  -SOHAM     Row Name 02/15/21 1411          Lower Body Dressing Assessment/Training    [place] Level (Lower Body Dressing)  doff;don;socks;standby assist  -SOHAM     Position (Lower Body Dressing)  edge of bed sitting  -SOHAM       User Key  (r) = Recorded By, (t) = Taken By, (c) = Cosigned By    Initials Name Provider Type    Donovan Dejesus OT Occupational Therapist        Obj/Interventions     Row Name 02/15/21 1412          Sensory Assessment (Somatosensory)    Sensory Assessment (Somatosensory)  UE sensation intact  -SOHAM     Row Name 02/15/21 1412          Vision Assessment/Intervention    Visual Impairment/Limitations  WFL  -SOHAM     Row Name 02/15/21 1412          Range of Motion Comprehensive    General Range of Motion  bilateral upper extremity ROM WNL  -SOHAM     Row Name 02/15/21 1412          Strength Comprehensive (MMT)    Comment, General Manual Muscle Testing (MMT) Assessment  adequate strength, no focal deficits  -SOHAM     Row Name 02/15/21 1412          Balance    Balance Assessment  sitting static balance;sitting dynamic balance;sit to stand dynamic balance;standing static balance;standing dynamic balance  -SOHAM     Static Sitting Balance  WFL;sitting, edge of bed  -SOHAM     Dynamic Sitting Balance  WFL;sitting, edge of bed  -SOHAM     Sit to Stand Dynamic Balance  mild impairment;supported;standing  -SOHAM     Static Standing Balance  WFL;supported;standing  -SOHAM     Dynamic Standing Balance  mild impairment;supported;standing  -SOHAM     Balance Interventions  sitting;standing;sit to stand;supported;static;dynamic  -SOHAM       User Key  (r) = Recorded By, (t) = Taken By, (c) = Cosigned By    Initials Name Provider Type    Donovan Dejesus OT Occupational Therapist        Goals/Plan     Row Name 02/15/21 1419          Transfer Goal  1 (OT)    Activity/Assistive Device (Transfer Goal 1, OT)  transfers, all;sit-to-stand/stand-to-sit;bed-to-chair/chair-to-bed;toilet;cane, straight;cane, quad  -SOHAM     [place] Level/Cues Needed (Transfer Goal 1, OT)  standby assist  -SOHAM     Time Frame (Transfer Goal 1, OT)  2 weeks;short term goal (STG)  -SOHAM     Progress/Outcome (Transfer Goal 1, OT)  goal ongoing  -SOHAM     Row Name 02/15/21 1419          Bathing Goal 1 (OT)    Activity/Device (Bathing Goal 1, OT)  bathing skills, all;upper body bathing;lower body bathing  -SOHAM     [place] Level/Cues Needed (Bathing Goal 1, OT)  set-up required  -SOHAM     Time Frame (Bathing Goal 1, OT)  2 weeks;short term goal (STG)  -SOHAM     Progress/Outcomes (Bathing Goal 1, OT)  goal ongoing  -SOHAM     Row Name 02/15/21 1419          Grooming Goal 1 (OT)    Activity/Device (Grooming Goal 1, OT)  grooming skills, all  -SOHAM     Leon (Grooming Goal 1, OT)  set-up required  -SOHAM     Time Frame (Grooming Goal 1, OT)  2 weeks;short term goal (STG)  -SOHAM     Strategies/Barriers (Grooming Goal 1, OT)  at sinkside  -SOHAM     Progress/Outcome (Grooming Goal 1, OT)  goal ongoing  -SOHAM     Row Name 02/15/21 1419          Therapy Assessment/Plan (OT)    Planned Therapy Interventions (OT)  activity tolerance training;adaptive equipment training;BADL retraining;functional balance retraining;IADL retraining;occupation/activity based interventions;patient/caregiver education/training;ROM/therapeutic exercise;strengthening exercise;transfer/mobility retraining  -SOHAM       User Key  (r) = Recorded By, (t) = Taken By, (c) = Cosigned By    Initials Name Provider Type    Donovan Dejesus OT Occupational Therapist        Clinical Impression     Row Name 02/15/21 1413          Plan of Care Review    Plan of Care Reviewed With  patient  -SOHAM     Outcome Summary  Pt is 80 y.o. male admitted to [facility] with weakness and AARON, bradycardia, and difficulty walking. Pt reports living with with spouse and  indep with ADLs PTA. Pt presented with adequate strength this date and fair functional activity tolerance. Pt able to perform LB dressing, donning/doffing B socks with SBA. Pt demo'd ability to ambulate from bed <> sinkside with CGA and cane. Pt demo'd mild unsteadiness throughout, but balance improved with increased time on feet. Pt would benefit from skilled OT services while admitted acutely, anticipating d/c home with wife when medically appropriate.  -SOHAM     Row Name 02/15/21 1413          Therapy Assessment/Plan (OT)    Rehab Potential (OT)  good, to achieve stated therapy goals  -SOHAM     Criteria for Skilled Therapeutic Interventions Met (OT)  yes  -SOHAM     Therapy Frequency (OT)  3 times/wk  -SOHAM     Row Name 02/15/21 1413          Therapy Plan Review/Discharge Plan (OT)    Anticipated Discharge Disposition (OT)  home  -SOHAM     Row Name 02/15/21 1413          Positioning and Restraints    Pre-Treatment Position  in bed  -SOHAM     Post Treatment Position  bed  -SOHAM     In Bed  notified nsg;call light within reach;encouraged to call for assist;with nsg;with other staff  -SOHAM       User Key  (r) = Recorded By, (t) = Taken By, (c) = Cosigned By    Initials Name Provider Type    SOHAMDonovan Pillai, OT Occupational Therapist        Outcome Measures     Row Name 02/15/21 1420          How much help from another is currently needed...    Putting on and taking off regular lower body clothing?  4  -SOHAM     Bathing (including washing, rinsing, and drying)  3  -SOHAM     Toileting (which includes using toilet bed pan or urinal)  3  -SOHAM     Putting on and taking off regular upper body clothing  3  -SOHAM     Taking care of personal grooming (such as brushing teeth)  3  -SOHAM     Eating meals  4  -SOHAM     AM-PAC 6 Clicks Score (OT)  20  -SOHAM     Row Name 02/15/21 1420          Functional Assessment    Outcome Measure Options  AM-PAC 6 Clicks Daily Activity (OT)  -SOHAM       User Key  (r) = Recorded By, (t) = Taken By, (c) = Cosigned By     Initials Name Provider Type    Donovan Dejesus OT Occupational Therapist        Occupational Therapy Education                 Title: PT OT SLP Therapies (In Progress)     Topic: Occupational Therapy (In Progress)     Point: ADL training (Done)     Description:   Instruct learner(s) on proper safety adaptation and remediation techniques during self care or transfers.   Instruct in proper use of assistive devices.              Learning Progress Summary           Patient Acceptance, E, VU by SOHAM at 2/15/2021 1420    Comment: educated pt on ot role, ot plan of care, safety techniques during functional mobility tasks and overall safety with ADL task completion                   Point: Home exercise program (Not Started)     Description:   Instruct learner(s) on appropriate technique for monitoring, assisting and/or progressing therapeutic exercises/activities.              Learner Progress:  Not documented in this visit.          Point: Precautions (Done)     Description:   Instruct learner(s) on prescribed precautions during self-care and functional transfers.              Learning Progress Summary           Patient Acceptance, E, VU by SOHAM at 2/15/2021 1420    Comment: educated pt on ot role, ot plan of care, safety techniques during functional mobility tasks and overall safety with ADL task completion                   Point: Body mechanics (Done)     Description:   Instruct learner(s) on proper positioning and spine alignment during self-care, functional mobility activities and/or exercises.              Learning Progress Summary           Patient Acceptance, E, VU by SOHAM at 2/15/2021 1420    Comment: educated pt on ot role, ot plan of care, safety techniques during functional mobility tasks and overall safety with ADL task completion                               User Key     Initials Effective Dates Name Provider Type Discipline    SOHAM 09/14/20 -  Donovan Kent, OT Occupational Therapist OT              OT  Recommendation and Plan  Planned Therapy Interventions (OT): activity tolerance training, adaptive equipment training, BADL retraining, functional balance retraining, IADL retraining, occupation/activity based interventions, patient/caregiver education/training, ROM/therapeutic exercise, strengthening exercise, transfer/mobility retraining  Therapy Frequency (OT): 3 times/wk  Plan of Care Review  Plan of Care Reviewed With: patient  Outcome Summary: Pt is 80 y.o. male admitted to [facility] with weakness and AARON, bradycardia, and difficulty walking. Pt reports living with with spouse and indep with ADLs PTA. Pt presented with adequate strength this date and fair functional activity tolerance. Pt able to perform LB dressing, donning/doffing B socks with SBA. Pt demo'd ability to ambulate from bed <> sinkside with CGA and cane. Pt demo'd mild unsteadiness throughout, but balance improved with increased time on feet. Pt would benefit from skilled OT services while admitted acutely, anticipating d/c home with wife when medically appropriate.     Time Calculation:   Time Calculation- OT     Row Name 02/15/21 1421             Time Calculation- OT    OT Start Time  1000  -SOHAM      OT Stop Time  1015  -SOHAM      OT Time Calculation (min)  15 min  -SOHAM      Total Timed Code Minutes- OT  8 minute(s)  -SOHAM      OT Received On  02/15/21  -SOHAM      OT - Next Appointment  02/17/21  -SOHAM      OT Goal Re-Cert Due Date  03/01/21  -SOHAM        User Key  (r) = Recorded By, (t) = Taken By, (c) = Cosigned By    Initials Name Provider Type    SOHAM Donovan Kent, OT Occupational Therapist        Therapy Charges for Today     Code Description Service Date Service Provider Modifiers Qty    [id number]  OT EVAL LOW COMPLEXITY 2 2/15/2021 Donovan Kent, OT GO 1    [id number] HC OT SELF CARE/MGMT/TRAIN EA 15 MIN 2/15/2021 Donovan Kent, OT GO 1               Donovan Kent OT  2/15/2021

## 2021-02-15 NOTE — PROGRESS NOTES
LOS: 3 days   Patient Care Team:  Mal Barboza MD as PCP - General  Mal Barboza MD as PCP - Family Medicine    Chief Complaint:   Chief Complaint   Patient presents with   • Weakness - Generalized     Dr. Medina sent patinet in for c/o generalized weakness and lethargy ongoing for 3 weeks but worsened today.         Subjective    Today the patient is awake, alert, and does not seem to be in any distress.  He does seem to be very tired and fatigued but fortunately he is not having any significant chest pain or shortness of breath.  He has no specific complaint other than his fatigue.    Interval History:     Patient Complaints: Fatigue and tiredness  Patient Denies: He denies chest pain, abdominal pain, nausea or vomiting,  History taken from: patient chart RN    Review of Systems:    Overall his review of system primarily revolves around his fatigue and tiredness.  He realizes that his heart rate is very slow and has some concerns about his medications.  Otherwise he seems to feel well with no chest pain or shortness of breath, no abdominal pain or nausea, no other aches or pains that he describes.  He has no concerns about his other medications.    Objective      Vital Signs  Temp:  [98 °F (36.7 °C)-99.2 °F (37.3 °C)] 98.9 °F (37.2 °C)  Pulse:  [] 109  Resp:  [18] 18  BP: (111-154)/(55-97) 154/97    I/O'S  Intake & Output (last 7 days)       02/08 0701 - 02/09 0700 02/09 0701 - 02/10 0700 02/10 0701 - 02/11 0700 02/11 0701 - 02/12 0700 02/12 0701 - 02/13 0700 02/13 0701 - 02/14 0700 02/14 0701 - 02/15 0700 02/15 0701 - 02/16 0700                Urine Unmeasured Occurrence       1 x     Stool Unmeasured Occurrence       1 x           Physical Exam:   General Appearance alert, pleasant, appears stated age, cooperative and is not in any obvious distress.  He is not dyspneic at rest.  He just seems very tired.  Lungs clear to auscultation, respirations regular, respirations even and respirations  unlabored  Heart normal S1, S2, no murmur, no gallop, no rub, no click and his rate is persistently slow.  It is in the 40s to 50s even after exertion  Extremities moves extremities well, no edema, no cyanosis and no redness     Results Review:     I reviewed the patient's new clinical results.  I reviewed the patient's other test results and agree with the interpretation                     Results from last 7 days   Lab Units 02/15/21  0545 02/14/21  1428   SODIUM mmol/L 139 139   POTASSIUM mmol/L 4.8 4.3   CHLORIDE mmol/L 100 100   CO2 mmol/L 18.0* 22.8   BUN mg/dL 42* 31*   CREATININE mg/dL 3.44* 2.44*   GLUCOSE mg/dL 137* 147*   CALCIUM mg/dL 9.2 9.6         Results from last 7 days   Lab Units 02/15/21  0545 02/14/21  1428   WBC 10*3/mm3 7.58 8.90   HEMOGLOBIN g/dL 10.6* 11.6*   HEMATOCRIT % 34.3* 37.2*   PLATELETS 10*3/mm3 206 248         Results from last 7 days   Lab Units 02/15/21  0545 02/14/21  1428   MAGNESIUM mg/dL 1.8 1.8     Results from last 7 days   Lab Units 02/15/21  0545   CHOLESTEROL mg/dL 67   TRIGLYCERIDES mg/dL 78   HDL CHOL mg/dL 30*   LDL CHOL mg/dL 20     Results from last 7 days   Lab Units 02/15/21  0545   PROBNP pg/mL 9,307.0*           Lab Results   Component Value Date    HGBA1C 6.20 (H) 02/01/2021     Glucose   Date/Time Value Ref Range Status   02/14/2021 2342 121 70 - 130 mg/dL Final             Medication Review:   Scheduled Meds:allopurinol, 100 mg, Oral, Daily  amantadine, 100 mg, Oral, Q12H  budesonide-formoterol, 2 puff, Inhalation, BID - RT  carbidopa-levodopa, 1 tablet, Oral, TID  cholecalciferol, 1,000 Units, Oral, Daily  docusate sodium, 100 mg, Oral, BID  ferrous sulfate, 325 mg, Oral, Daily With Breakfast  metoprolol tartrate, 50 mg, Oral, Q12H  montelukast, 10 mg, Oral, Nightly  pioglitazone, 15 mg, Oral, Daily  pravastatin, 40 mg, Oral, Daily  sodium chloride, 3 mL, Intravenous, Q12H      Continuous Infusions:sodium chloride, 75 mL/hr, Last Rate: 75 mL/hr (02/15/21  1020)      PRN Meds:.•  acetaminophen **OR** acetaminophen **OR** acetaminophen  •  calcium carbonate  •  melatonin  •  nitroglycerin  •  ondansetron  •  sodium chloride  •  sodium chloride              Patient Active Problem List   Diagnosis   • Closed right ankle fracture   • Poisoning by warfarin sodium   • Anemia   • Type 2 diabetes mellitus (CMS/HCC)   • Hypertension   • Hyperlipidemia   • Gout   • Parkinson disease (CMS/HCC)   • Anxiety   • Atrial fibrillation (CMS/[facility])   • Coronary artery disease involving native coronary artery with angina pectoris (CMS/[facility])   • Poor compliance with medication   • Acute kidney injury superimposed on chronic kidney disease (CMS/HCC)   • Acute gastrointestinal bleeding   • Nosebleed (Resolved)   • Melena   • Generalized weakness   • Elevated troponin   • Ataxia   • Volume depletion   • Bradycardia   • Cardiomegaly       #1 Fatigue and Malaise-he seemed to develop symptoms relatively suddenly on Sunday.  Both he and his wife described him being relatively well and asymptomatic on Saturday.  Apparently on Sunday he became much more tired and dysfunctional.  She also noted that on Sunday his heart rate became persistently bradycardic in the 50s.  His blood pressure and blood sugars were stable.  He had no other specific complaints.    2.  Bradycardia-the patient is notably bradycardic.  He has been seen by cardiology is reduced his metoprolol dose.  Hopefully that will help  his rate.  This has not been a significant problem for him in the past but it may be now.    3.  ASCVD-in a patient with a history of cardiac disease.  He is not having any chest pain or other coronary artery disease symptoms at this point.  He has been relatively stable for quite some time as well.    4.  Atrial fibrillation-he is on a number of medications for this.  He recently had complications with his Coumadin which led to his severe epistaxis and he was changed to Eliquis but that seems to have  resolved at this point.    5.  Diabetes mellitus type 2-his blood sugars have generally been relatively well controlled.  I will check a hemoglobin A1c and continue to follow his blood sugars closely    6.  Anemia-he still has some mild anemia related to his recent episode of epistaxis but also to his underlying chronic renal disease.  He is taking oral iron regularly.  Despite being on iron his iron level is still low at 33 and his saturation level is still low at 9.  His ferritin is improved at 205    7.  AARON/CKD 3-he has underlying chronic renal insufficiency stage III and he clearly has acute decompensation of his renal function.  His renal ultrasound today showed no acute change or acute abnormality.  Nonetheless his renal function did decline again.  His GFR is down to 17 which is a precipitous drop compared to his previous admission when his GFR at discharge was 57.    8.  High cholesterol-treated with medications and well-controlled currently with a cholesterol of 67 and an LDL of 20    9.  Parkinson's disease-relatively well controlled with several medications.  Parkinson's disease certainly can cause some degree of fatigue and malaise but this seems sudden and profound not likely related to his Parkinson's disease.    10.  Hypertension-his blood pressures well controlled on current medications    11.  Gout-his uric acid level has been stable on allopurinol    Clearly he seems to have decompensated quickly.  Exactly why is not clear.  He remains bradycardic and his kidney function remains poor.  Hopefully the fluids and reduced metoprolol doses will help with both problems.              Plan for disposition:Where: home    Mal Barboza MD  02/15/21  13:33 EST          Time:

## 2021-02-15 NOTE — OUTREACH NOTE
Medical Week 2 Survey      Responses   [facility] patient discharged from?  [facility]   Does the patient have one of the following disease processes/diagnoses(primary or secondary)?  Other   Week 2 attempt successful?  No   Unsuccessful attempts  Attempt 1   Revoke  Readmitted          Vonda Durand RN

## 2021-02-15 NOTE — SIGNIFICANT NOTE
02/15/21 0815   OTHER   Discipline physical therapist   Rehab Time/Intention   Session Not Performed other (see comments)  (Pt nurse states that he is off floor for cardiology, not a prioroty evaluation to be completed today. Check back tommorrow.)   Recommendation   PT - Next Appointment 02/16/21

## 2021-02-15 NOTE — CONSULTS
NEPHROLOGY CONSULT NOTE    Referring Provider: Mal Barboza MD  Reason for Consultation: Acute kidney injury on CKD stage IIIa    Chief complaint:  Worsening weakness    History of present illness:    Mr. Villeda is a 80-year-old pleasant gentleman who came to emergency room with worsening weakness and fatigue.  Upon presentation had low blood pressure.  He was also found to be bradycardic.  He tells me he was recently admitted due to nosebleed in the setting of supratherapeutic INR.  Upon presentation he was found to be in acute renal failure with creatinine up to 2.4 mg/dL.  He is currently on Lasix 20 mg daily  He denies lower extremity swelling  Denies shortness of breath, orthopnea or PND.    Past Medical History:   Diagnosis Date   • Ankle fracture     RIGHT ANKLE    • Anxiety     ABOUT CURRENT HEALTH SITUATION    • Atrial fibrillation (CMS/HCC)    • Constipation due to opioid therapy     NO BOWEL MOVEMENT IN 5 DAYS   • Coronary artery disease    • Diabetes mellitus (CMS/HCC)    • Gout     BIG TOES   • Hyperlipidemia    • Hypertension    • Iron deficiency anemia    • Parkinson disease (CMS/HCC)    • Supratherapeutic INR      Past Surgical History:   Procedure Laterality Date   • ANKLE OPEN REDUCTION INTERNAL FIXATION Right 11/12/2019    Procedure: OPEN REDUCTION INTERNAL FIXATION RIGHT ANKLE;  Surgeon: Evgeny Keith II, MD;  Location: [facility];  Service: Orthopedics   • CATARACT EXTRACTION EXTRACAPSULAR W/ INTRAOCULAR LENS IMPLANTATION Bilateral    • COLONOSCOPY  2011   • CORONARY ANGIOPLASTY WITH STENT PLACEMENT  1998   • ENDOSCOPY  1/31/2021    Procedure: ESOPHAGOGASTRODUODENOSCOPY AT BEDSIDE;  Surgeon: Geoff Rosenthal MD;  Location: [facility] ENDOSCOPY;  Service: Gastroenterology;;  PRE OP - BLACK STOOLS  POST OP - CANDIDA   • EXTRACORPOREAL SHOCK WAVE LITHOTRIPSY (ESWL)      OVER  10 YEARS AGO   • PILONIDAL CYSTECTOMY  1964     Family History   Problem Relation Age of Onset   • Kathryn  Hyperthermia Neg Hx      Social History     Tobacco Use   • Smoking status: Former Smoker   • Smokeless tobacco: Never Used   Substance Use Topics   • Alcohol use: Yes     Alcohol/week: 3.0 standard drinks     Types: 3 Shots of liquor per week     Comment: rickey   • Drug use: No     Medications Prior to Admission   Medication Sig Dispense Refill Last Dose   • allopurinol (ZYLOPRIM) 100 MG tablet Take 100 mg by mouth Daily.   2/14/2021 at Unknown time   • amantadine (SYMMETREL) 100 MG capsule Take 100 mg by mouth 2 (Two) Times a Day.   2/14/2021 at Unknown time   • apixaban (ELIQUIS) 5 MG tablet tablet Take 1 tablet by mouth Every 12 (Twelve) Hours for 30 days. 60 tablet 1 2/14/2021 at Unknown time   • carbidopa-levodopa (SINEMET)  MG per tablet Take 1 tablet by mouth 3 (Three) Times a Day.   2/14/2021 at Unknown time   • cholecalciferol (VITAMIN D3) 25 MCG (1000 UT) tablet Take 1,000 Units by mouth Daily.   2/14/2021 at Unknown time   • ezetimibe (ZETIA) 10 MG tablet Take 10 mg by mouth Daily.   2/14/2021 at Unknown time   • ferrous sulfate 325 (65 FE) MG EC tablet Take 1 tablet by mouth Daily With Breakfast. 90 tablet 3 2/14/2021 at Unknown time   • fluticasone-salmeterol (ADVAIR) 100-50 MCG/DOSE DISKUS Inhale 2 (Two) Times a Day.   2/14/2021 at Unknown time   • furosemide (Lasix) 20 MG tablet Take 1 tablet by mouth Daily. 90 tablet 3 2/14/2021 at Unknown time   • metoprolol tartrate (LOPRESSOR) 50 MG tablet Take 1.5 tablets by mouth Every 12 (Twelve) Hours. 270 tablet 3 2/14/2021 at Unknown time   • montelukast (SINGULAIR) 10 MG tablet Take 10 mg by mouth Every Night.   2/14/2021 at Unknown time   • pioglitazone (ACTOS) 15 MG tablet Take 15 mg by mouth Daily.   2/14/2021 at Unknown time   • pravastatin (PRAVACHOL) 40 MG tablet Take 40 mg by mouth Daily.   2/14/2021 at Unknown time     Allergies:  Patient has no known allergies.    Review of Systems  14 point review of systems is otherwise negative except  "for mentioned above on HPI    Objective     Vital Signs  Temp:  [96.9 °F (36.1 °C)-97.5 °F (36.4 °C)] 97.5 °F (36.4 °C)  Heart Rate:  [48-87] 58  Resp:  [16-18] 18  BP: (101-122)/(46-73) 113/56    Flowsheet Rows      First Filed Value   Admission Height  172.7 cm (68\") Documented at 02/14/2021 1318   Admission Weight  72.6 kg (160 lb) Documented at 02/14/2021 1318           No intake/output data recorded.  No intake/output data recorded.  No intake or output data in the 24 hours ending 02/15/21 0954    Physical Exam:  General Appearance: alert, oriented x 3, no acute distress   Skin: warm and dry  HEENT: oral mucosa normal, nonicteric sclera  Neck: supple, no JVD  Lungs: CTA  Heart: RRR, normal S1 and S2, bradycardic  Abdomen: soft, nontender, nondistended  : no palpable bladder  Extremities: no edema, cyanosis or clubbing  Neuro: normal speech and mental status     Results Review:  Results from last 7 days   Lab Units 02/15/21  0545 02/14/21  1428   SODIUM mmol/L 139 139   POTASSIUM mmol/L 4.8 4.3   CHLORIDE mmol/L 100 100   CO2 mmol/L 18.0* 22.8   BUN mg/dL 42* 31*   CREATININE mg/dL 3.44* 2.44*   CALCIUM mg/dL 9.2 9.6   BILIRUBIN mg/dL  --  1.1   ALK PHOS U/L  --  207*   ALT (SGPT) U/L  --  <5   AST (SGOT) U/L  --  34   GLUCOSE mg/dL 137* 147*     Results from last 7 days   Lab Units 02/15/21  0545 02/14/21  1428   MAGNESIUM mg/dL 1.8 1.8   PHOSPHORUS mg/dL 6.0*  --      Results from last 7 days   Lab Units 02/15/21  0545   URIC ACID mg/dL 6.6         allopurinol, 100 mg, Oral, Daily  amantadine, 100 mg, Oral, Q12H  budesonide-formoterol, 2 puff, Inhalation, BID - RT  carbidopa-levodopa, 1 tablet, Oral, TID  cholecalciferol, 1,000 Units, Oral, Daily  docusate sodium, 100 mg, Oral, BID  ferrous sulfate, 325 mg, Oral, Daily With Breakfast  metoprolol tartrate, 50 mg, Oral, Q12H  montelukast, 10 mg, Oral, Nightly  pioglitazone, 15 mg, Oral, Daily  pravastatin, 40 mg, Oral, Daily  sodium chloride, 3 mL, " Intravenous, Q12H      sodium chloride, 75 mL/hr        Assessment/Plan   1.  Acute kidney injury on CKD stage III a likely along the spectrum of prerenal azotemia versus ATN.  It appears that patient may be hypovolemic as he has no clinical signs of fluid overload on exam.  Need to rule out obstructive uropathy.  2.  Chronic diastolic congestive heart failure  3.  Type 2 diabetes  4.  Bradycardia  5.  Hypertension now with hypotension  6.  Hyperlipidemia  7.  Parkinson disease, on Sinemet  8.  Gout, currently on allopurinol as an outpatient    Plan:  1.  We will hold Lasix as patient appears to be euvolemic to hypovolemic  2.  We will start gentle IV hydration with normal saline at 75 cc/h  3.  We will obtain renal ultrasound to ensure no evidence of obstructive uropathy  4.  We will obtain serologic work-up for proteinuria  5.  Renal panel magnesium in a.m.      I discussed the patient's findings and my recommendations with patient and he agrees with plan.    Thank you for involving us in the care of Mr. Villeda.  We will continue to follow along.    Jeison Solares MD  02/15/21  09:54 EST      Much of this encounter note is an electronic transcription/translation of spoken language to printed text. The electronic translation of spoken language may permit erroneous, or at times, nonsensical words or phrases to be inadvertently transcribed; Although I have reviewed the note for such errors, some may still exist.

## 2021-02-15 NOTE — H&P
[facility]  INTERNAL MEDICINE  MARSHALL MEDINA MD  [address] [place] [address]  Phone [phone] Fax [phone]  E-mail:  [email or web address]      INTERNAL MEDICINE HISTORY AND PHYSICAL EXAM  Marshall Medina M.D.  2021            Patient Identification:  Name: Dustin Villeda  Age: 80 y.o.  Sex: male  :  1940  MRN: [id number]         Primary Care Physician: Mal Barboza MD  LENGTH OF STAY 0 DAYS    Consults     Date and Time Order Name Status Description    2021 Inpatient Nephrology Consult      2021 Inpatient Cardiology Consult      2021 154 Family Medicine Consult Completed     2/3/2021 0751 Inpatient ENT Consult Completed     2021 0154 Inpatient Cardiology Consult Completed     2021 0845 Pulmonology (on-call MD unless specified) Completed     2021 0845 Gastroenterology (on-call MD unless specified) Completed     2021 1412 Family Medicine Consult Completed           Chief Complaint: Weakness    History of Present Illness:  Subjective     Interval History: Patient is a 80 y.o.male who presented to the [facility] emergency department by private car on 2021 after wife discussed the case with me.  At my recommendation, the wife brought patient in by private car to evaluate ongoing issues with severe weakness, bradycardia, low blood pressure, and difficulty walking.  I am familiar with this patient who I covered for 2 weeks ago when he was admitted with a significant nosebleed to the ICU and concern was raised about the possibility of a GI bleed.  Work-up including an upper GI endoscopy however was unremarkable and it appeared that the main concern for the patient was a nosebleed which occurred while he was hyper coagulated with Coumadin after taking an extra dose of the medication.  He was transitioned to Eliquis 5 mg p.o. twice daily prior to discharge to home.  He was supposed  to have a follow-up last week with cardiology but unfortunately could not make that appointment because of ice and snow that prohibited him from getting into the appointment.  He has also been on increased dose of metoprolol at 75 mg twice daily to control his heart rate and try to prevent atrial fibrillation.  The other new medication that the patient has been on Sinemet for treatment of Parkinson's disease.  Patient's list of comorbidities include: Atrial fibrillation controlled at present time with metoprolol 75 twice daily, acute kidney injury superimposed on chronic kidney disease, ataxia, volume depletion, cardiomegaly, anemia, type 2 diabetes mellitus, hypertension, hyperlipidemia, gout, Parkinson's disease, coronary artery disease status post angioplasty, and anxiety.    Patient was evaluated in the emergency room by Dr. George Taylor after his wife brought him in.  He reported that his weakness had gotten progressively worse to the point where he was unable to ambulate on the day he was brought in.  Patient also reported a chronic cough.  He felt that his weakness was generalized and nonfocal.  He denied any slurred speech or visual changes.  He stated that his shortness of breath was no worse than usual, that he had no fevers, and that he had no nausea vomiting or diarrhea.  He did feel that his shortness of breath was aggravated by walking and that caused increased weakness.  Review of systems in the ER was positive for cough but negative for shortness of breath.  Is also positive for generalized weakness.  Physical exam revealed temperature of 96.9, heart rate 48, respiratory rate 16, blood pressure 101/60, and sat 95%.  Patient was noted to be bradycardic but had no significant murmurs or changes in his cardiac exam.  He was oriented to person place and time.  Neurologically he had no significant changes in his strength side to side.  Labs collected in the emergency room did show glucose elevated at  147, BUN 31, creatinine 2.44, alkaline phosphatase 207, troponin elevated at 0.055, magnesium 1.8, white blood cell count 8.9, hemoglobin 11.6 slightly low, platelets normal at 248,000, UA dark yellow specific gravity elevated 1.022 and 2+ protein, and chest x-ray showed minimal scarring left lower lobe cardiomegaly.  The ER doctor did review patient's EKG which showed sinus bradycardia rate 49 with nonspecific ST changes.  Case was discussed with myself and patient was admitted to Dr. Todd's service for further evaluation and work-up of his issues.    2/14/2021.  I personally saw the patient for the first time during this hospital stay while he remained in ER room #9.  Patient was wearing a mask at the time of my exam.  I wore full PPE including surgical facemask, N95 mask, protective goggles, gloves when touching patient, and white lab coat.  Thorough hand hygiene was performed before and after the patient visit.  Patient was alert and coherent.  He recognized me from 2 weeks ago immediately upon my arrival in the room.  Patient was having some significant bradycardia in the rate of 48 when I arrived in the room.  He denies any dizziness, increased weakness, or other significant changes at the time of my exam.  Patient's heart sounds are stable abdomen was soft extremities were normal and neurologically he seems grossly intact though he did report that he felt so weak he would not be able to go to the bathroom on his own.  Given the chance.  I did review the work-up with the patient including his lab results.  I did tell him that I thought it would be most appropriate to admit him overnight so that we could evaluate further some of the findings including his elevated troponin, the progression of his weakness, the bradycardia, and the ataxia.  Patient is agreeable to admission.  Cardiology consult will be obtained for their input on the bradycardia and dosing of his medications.  I also consulted renal for their  input on the patient's volume status since he does appear to be slightly dehydrated.  Again patient is agreeable to these treatment plans.  Patient was hemodynamically stable when I left him in the emergency room.    Review of Systems:    A comprehensive 14 point review of systems was negative except for:  Constitution:  positive for fatigue and malaise  Respiratory: positive for  cough, dry and shortness of air  Cardiovascular: positive for  irregular pulse and palpitations  Neurological: positive for  coordination abnormal, difficulty walking, tremors and weakness  Behavioral/Psych: positive for  anxiety    Past Medical History:   Diagnosis Date   • Ankle fracture     RIGHT ANKLE    • Anxiety     ABOUT CURRENT HEALTH SITUATION    • Atrial fibrillation (CMS/HCC)    • Constipation due to opioid therapy     NO BOWEL MOVEMENT IN 5 DAYS   • Coronary artery disease    • Diabetes mellitus (CMS/HCC)    • Gout     BIG TOES   • Hyperlipidemia    • Hypertension    • Iron deficiency anemia    • Parkinson disease (CMS/HCC)    • Supratherapeutic INR      Past Surgical History:   Procedure Laterality Date   • ANKLE OPEN REDUCTION INTERNAL FIXATION Right 11/12/2019    Procedure: OPEN REDUCTION INTERNAL FIXATION RIGHT ANKLE;  Surgeon: Evgeny Keith II, MD;  Location: [facility];  Service: Orthopedics   • CATARACT EXTRACTION EXTRACAPSULAR W/ INTRAOCULAR LENS IMPLANTATION Bilateral    • COLONOSCOPY  2011   • CORONARY ANGIOPLASTY WITH STENT PLACEMENT  1998   • ENDOSCOPY  1/31/2021    Procedure: ESOPHAGOGASTRODUODENOSCOPY AT BEDSIDE;  Surgeon: Geoff Rosenthal MD;  Location: [facility] ENDOSCOPY;  Service: Gastroenterology;;  PRE OP - BLACK STOOLS  POST OP - CANDIDA   • EXTRACORPOREAL SHOCK WAVE LITHOTRIPSY (ESWL)      OVER  10 YEARS AGO   • PILONIDAL CYSTECTOMY  1964     No Known Allergies  Family History   Problem Relation Age of Onset   • Malig Hyperthermia Neg Hx      Social History     Socioeconomic History   • Marital  "status:      Spouse name: Not on file   • Number of children: Not on file   • Years of education: Not on file   • Highest education level: Not on file   Tobacco Use   • Smoking status: Former Smoker   • Smokeless tobacco: Never Used   Substance and Sexual Activity   • Alcohol use: Yes     Alcohol/week: 3.0 standard drinks     Types: 3 Shots of liquor per week     Comment: rickey   • Drug use: No   • Sexual activity: Defer       PMH, FH, SH and ROS completed with Admission History and Physical and updated in EPIC system.        Objective     Scheduled Meds:[START ON 2/15/2021] allopurinol, 100 mg, Oral, Daily  amantadine, 100 mg, Oral, Q12H  apixaban, 5 mg, Oral, Q12H  budesonide-formoterol, 2 puff, Inhalation, BID - RT  carbidopa-levodopa, 1 tablet, Oral, TID  [START ON 2/15/2021] cholecalciferol, 1,000 Units, Oral, Daily  docusate sodium, 100 mg, Oral, BID  [START ON 2/15/2021] ferrous sulfate, 325 mg, Oral, Daily With Breakfast  [START ON 2/15/2021] furosemide, 20 mg, Oral, Daily  metoprolol tartrate, 75 mg, Oral, Q12H  montelukast, 10 mg, Oral, Nightly  [START ON 2/15/2021] pioglitazone, 15 mg, Oral, Daily  [START ON 2/15/2021] pravastatin, 40 mg, Oral, Daily  sodium chloride, 3 mL, Intravenous, Q12H      Continuous Infusions:     Vital signs in last 24 hours:  Temp:  [96.9 °F (36.1 °C)-97.5 °F (36.4 °C)] 97.5 °F (36.4 °C)  Heart Rate:  [48-87] 60  Resp:  [16-18] 18  BP: (101-122)/(46-73) 118/55    Intake/Output:  No intake or output data in the 24 hours ending 02/14/21 2035    Exam:  /55 (BP Location: Right arm, Patient Position: Sitting)   Pulse 60   Temp 97.5 °F (36.4 °C) (Oral)   Resp 18   Ht 172.7 cm (68\")   Wt 72.2 kg (159 lb 3.2 oz)   SpO2 95%   BMI 24.21 kg/m²     Constitutional: Alert, cooperative, mild distress, AAOx3, resting comfortably   Head: Normocephalic, without obvious abnormality, atraumatic   Eyes: PERRLA, conjunctiva/corneas clear, no icterus, no conjunctival pallor, EOM's " intact, both eyes   ENT and Mouth: Lips, tongue, gums normal; oral mucosa pink and moist   Neck: Supple, symmetrical, trachea midline, no JVD   Respiratory: Clear to auscultation bilaterally, respirations unlabored   Cardiovascular: Irregular rate and rhythm, S1 and S2 normal, no murmur, No rub or gallop. Pulses normal.   Gastrointestinal: BS present x4. Soft, non-tender, bowels sounds active, no masses no hepatosplenomegaly.   : No hernia. Normal exam for sex.   Neurologic: CN-XII intact, motor strength grossly intact, sensation grossly intact to light touch, no focal reflex deficits noted.   Psychiatric: Alert, oriented X3, no delusions, psychoses, depression or anxiety   Heme/Lymph/Imun: No bruises, petechiae. Lymph nodes normal in size / configuration.     Data Review:  Lab Results   Component Value Date    CALCIUM 9.6 02/14/2021     Results from last 7 days   Lab Units 02/14/21  1428   AST (SGOT) U/L 34   MAGNESIUM mg/dL 1.8   SODIUM mmol/L 139   POTASSIUM mmol/L 4.3   CHLORIDE mmol/L 100   CO2 mmol/L 22.8   BUN mg/dL 31*   CREATININE mg/dL 2.44*   GLUCOSE mg/dL 147*   CALCIUM mg/dL 9.6   WBC 10*3/mm3 8.90   HEMOGLOBIN g/dL 11.6*   PLATELETS 10*3/mm3 248   ALT (SGPT) U/L <5     Lab Results   Component Value Date    TROPONINT 0.055 (C) 02/14/2021     Estimated Creatinine Clearance: 25.5 mL/min (A) (by C-G formula based on SCr of 2.44 mg/dL (H)).  WEIGHTS:     Wt Readings from Last 1 Encounters:   02/14/21 1832 72.2 kg (159 lb 3.2 oz)   02/14/21 1318 72.6 kg (160 lb)         Assessment:    Acute kidney injury superimposed on chronic kidney disease (CMS/HCC)    Atrial fibrillation (CMS/HCC)    Generalized weakness    Elevated troponin    Ataxia    Volume depletion    Bradycardia    Cardiomegaly    Anemia    Type 2 diabetes mellitus (CMS/HCC)    Hypertension    Hyperlipidemia    Gout    Parkinson disease (CMS/HCC)    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)     Anxiety      Attending Physician Assessment and Plan:    1.  Elevated troponin in patient with history of coronary artery disease requiring stenting in the past, and ongoing issues with atrial fibrillation and severe bradycardia while on beta-blockers.  Patient may require additional medication adjustments.  Cardiology consult has been obtained.  Troponin will be checked again tomorrow morning.  Troponin may be simply related to patient's renal status.    2.  Acute kidney injury superimposed on chronic kidney disease and associated with volume depletion.  We will correct fluid with gentle IV rehydration overnight.  Renal has been consulted for their input on the situation and further recommendations about further fluid management.    3.  Generalized weakness possibly related to the bradycardic episodes and recent increase in metoprolol dosing to 75 mg twice daily.  Cardiology has been consulted as mentioned above may want to adjust medication dosing.  PT and OT have been consulted for evaluation of the patient balance and walking abilities.    4.  Anemia of chronic disease.  Probably related to renal issues.  May be a residual problem from his previous admission 10 days ago.  In any case we will monitor and treat with transfusion.  Drops further.  Iron levels are a little low and iron replacement will be beneficial.  Oral medicine has been ordered for the patient.    5.  Type 2 diabetes mellitus with somewhat poor control of blood sugars.  Diet will be regulated.  Patient may benefit from diabetic education if desired.    6.  Hypertension.  Seems well controlled at present time on current medication regime.    7.  Hyperlipidemia.  Also controlled on outpatient basis with regular medications.    8.  Parkinson's disease.  Recently diagnosed inpatient and started on Sinemet therapy.  Patient is stable with his medication at the present time but may require some adjustment.    9.  Gouty arthritis.  Has been an issue for  patient in the past.  Will check uric acid level and adjust medication if necessary.    10.  Anxiety.  This is an ongoing issue for the patient and may relate to his recent hospitalizations.  Will monitor and treat if necessary.    Plan for disposition: Home with  wife in 1 to 2 days if symptoms improved and condition remains stable.      Marshall Medina MD  2/14/2021  15:00 EST    Electronically signed by Marshall Medina MD, 02/15/21, 2:45 AM EST.

## 2021-02-15 NOTE — CONSULTS
Patient Name: Dustin Villeda  :1940  80 y.o.    Date of Admission: 2021  Date of Consultation:  02/15/21  Encounter Provider: Justine Uribe RN  Place of Service: [facility] CARDIOLOGY  Referring Provider: Mal Barboza MD  Patient Care Team:  Mal Barboza MD as PCP - General (Internal Medicine)      Chief complaint: Elevated troponin    Reason for Consult:  Elevated troponin/ bradycardia    History of Present Illness:    Mr Villeda is a 80 year old gentleman with a history of atrial fibrillation (now on Eliquis), hypertension, diabetes, hyperlipidemia, coronary artery disease (s/p stent), anxiety, and recently diagnosed Parkinson's disease.     He was seen in the ED on  with a nose bleed and his INR was found to be >10 with Hgb 13.2.  He was told to hold his warfarin and given Vitamin K and was discharged with his PCP to follow up on his INR.  He returned to the hospital on 2021 with worsening fatigue.  His INR again was > 10, his stools positive for occult blood, and Hgb had dropped to 8.7.  He was admitted for GI consult and given Kcentra and Vitamin K.  I saw this patient at that time.  He was in atrial fibrillation and slightly tachycardic and his metoprolol was increased to 75 BID. The patient was noted to be non-compliant with his warfarin so he was changed to Eliquis.  EGD showed no active bleeding and the patient agreed to ENT evaluation as an outpatient.      He presented to the ED yesterday with weakness.  He has been weak since his discharge on  but it has worsened to the point he could hardly ambulate with some SOA.  His HR was noted to be low in the 50's with B/P 101/60. Labs included BUN/Creat /2.44 (which is worse from his previous admission of .), troponin 0.055, Hgb 11.6.     I agree with what is documented above.  The patient says that on Saturday he felt really weak and tired and could barely walk across the  room.  He had no angina or shortness of breath.  He's not feel lightheaded or dizzy, no vertigo.    Repeat troponin was 0.065, BNP 9307    Previous Cardiac Testing  ECHO 1/27/2021  · Calculated left ventricular EF = 53.1% Estimated left ventricular EF = 53% Estimated left ventricular EF was in agreement with the calculated left ventricular EF. Left ventricular systolic function is normal. Normal left ventricular cavity size noted. Left ventricular wall thickness is consistent with mild concentric hypertrophy. All left ventricular wall segments contract normally. Left ventricular diastolic function was indeterminate.  · Left atrial volume is severely increased. The right atrial cavity is severely dilated.  · There is mild thickening of the aortic valve.  · Severe mitral annular calcification is present. Mild mitral valve regurgitation is present.  · Mild tricuspid valve regurgitation is present. Estimated right ventricular systolic pressure from tricuspid regurgitation is mildly elevated (35-45 mmHg). Calculated right ventricular systolic pressure from tricuspid regurgitation is 44.4 mmHg.     Stress ECHO 7/15/2020  Echocardiogram Findings    Left Ventricle Left ventricular systolic function is normal. Calculated EF = 66%. Estimated EF was in agreement with the calculated EF. Normal left ventricular cavity size and wall thickness noted. All left ventricular wall segments contract normally. Left ventricular diastolic function is normal.   Right Ventricle Normal right ventricular cavity size, wall thickness, systolic function and septal motion noted.   Left Atrium Left atrial cavity size is mild-to-moderately dilated. Left atrial volume is moderately increased.   Right Atrium Normal right atrial size noted.   Aortic Valve The aortic valve is structurally normal. No aortic valve regurgitation is present. No aortic valve stenosis is present.   Mitral Valve The mitral valve is normal in structure. Trace-to-mild mitral  valve regurgitation is present with a centrally-directed jet noted. No significant mitral valve stenosis is present.   Tricuspid Valve The tricuspid valve is normal.  Mild tricuspid valve regurgitation is present. Estimated right ventricular systolic pressure from tricuspid regurgitation is mildly elevated (35-45 mmHg). Calculated right ventricular systolic pressure from tricuspid regurgitation is 42.3 mmHg.   Pulmonic Valve The pulmonic valve is structurally normal. There is no significant pulmonic valve stenosis present. There is no pulmonic valve regurgitation present.   Greater Vessels No dilation of the aortic root is present.   Pericardium The pericardium is normal. There is no evidence of pericardial effusion.   Stress Procedure    Rest ECG Baseline ECG of normal sinus rhythm noted. Normal baseline ECG noted at rest.   NM interval = 160 ms. QRS complex = 80 ms. There was no ST segment deviation noted.   Stress Description A stress test was performed following the Gage protocol.   The patient achieved the target heart rate. The patient requested the test to be stopped  because the patient experienced fatigue and shortness of breath. Pt physically unable to continue with test.   The patient reported expected effects during the stress test. The patient experienced no angina during the stress test.   Moderate risk for ischemic heart disease.   Blood pressure demonstrated a normal response to stress. Heart rate demonstrated a normal response to stress. Overall, the patient's exercise capacity was severely impaired.   Stress ECG Stress ECG of normal sinus rhythm noted. Normal ECG with no significant stress induced changes noted.   There was no ST segment deviation noted during stress.   Arrhythmias during stress: occasional PVCs, SVT. Pt had 2 separate runs of SVT during exercise.   Arrhythmias were not significant during stress.      Stress ECG was interpretable and is consistent with a normal stress ECG.      Recovery ECG During recovery, the patient complained of no significant symptoms following stress.   Sinus rhythm was noted during recovery. Normal ECG with no significant recovery phase changes noted. There was no ST segment deviation noted during recovery.   There were no arrhythmias during recovery.   There were no significant arrhythmias during recovery.   Stress Echo Findings    Ventricle Size / Description Segment augmentation had a normal response to stress. Cavity size behaved normally in response to stress. Left ventricular function is normal.   Stress Echo - Peak Stress Findings Post stress images with adequate visualization of myocardium to assess for ischemia. Normal stress echo with no significant echocardiographic evidence for myocardial ischemia.         Holter Monitor 1/27/2021  · An abnormal monitor study.  · No complications noted. The predominant rhythm noted during the testing period was atrial fibrillation.  · The patient spent 94.7% of the total Holter monitor in atrial fibrillation.  · There were frequent PVCs with a total of 9102 isolated events. There were frequent couplets and rare triplets.. There were no episodes of ventricular tachycardia    Past Medical History:   Diagnosis Date   • Ankle fracture     RIGHT ANKLE    • Anxiety     ABOUT CURRENT HEALTH SITUATION    • Atrial fibrillation (CMS/HCC)    • Constipation due to opioid therapy     NO BOWEL MOVEMENT IN 5 DAYS   • Coronary artery disease    • Diabetes mellitus (CMS/HCC)    • Gout     BIG TOES   • Hyperlipidemia    • Hypertension    • Iron deficiency anemia    • Parkinson disease (CMS/HCC)    • Supratherapeutic INR        Past Surgical History:   Procedure Laterality Date   • ANKLE OPEN REDUCTION INTERNAL FIXATION Right 11/12/2019    Procedure: OPEN REDUCTION INTERNAL FIXATION RIGHT ANKLE;  Surgeon: Evgeny Keith II, MD;  Location: [facility];  Service: Orthopedics   • CATARACT EXTRACTION EXTRACAPSULAR W/ INTRAOCULAR  LENS IMPLANTATION Bilateral    • COLONOSCOPY  2011   • CORONARY ANGIOPLASTY WITH STENT PLACEMENT  1998   • ENDOSCOPY  1/31/2021    Procedure: ESOPHAGOGASTRODUODENOSCOPY AT BEDSIDE;  Surgeon: Geoff Rosenthla MD;  Location: [facility] ENDOSCOPY;  Service: Gastroenterology;;  PRE OP - BLACK STOOLS  POST OP - CANDIDA   • EXTRACORPOREAL SHOCK WAVE LITHOTRIPSY (ESWL)      OVER  10 YEARS AGO   • PILONIDAL CYSTECTOMY  1964         Prior to Admission medications    Medication Sig Start Date End Date Taking? Authorizing Provider   allopurinol (ZYLOPRIM) 100 MG tablet Take 100 mg by mouth Daily.   Yes Luis Aiken MD   amantadine (SYMMETREL) 100 MG capsule Take 100 mg by mouth 2 (Two) Times a Day.   Yes Luis Aiken MD   apixaban (ELIQUIS) 5 MG tablet tablet Take 1 tablet by mouth Every 12 (Twelve) Hours for 30 days. 2/2/21 3/4/21 Yes Marcelo Rdz MD   carbidopa-levodopa (SINEMET)  MG per tablet Take 1 tablet by mouth 3 (Three) Times a Day.   Yes Luis Aiken MD   cholecalciferol (VITAMIN D3) 25 MCG (1000 UT) tablet Take 1,000 Units by mouth Daily.   Yes ProviderLuis MD   ezetimibe (ZETIA) 10 MG tablet Take 10 mg by mouth Daily.   Yes ProviderLuis MD   ferrous sulfate 325 (65 FE) MG EC tablet Take 1 tablet by mouth Daily With Breakfast. 2/4/21  Yes Mal Barboza MD   fluticasone-salmeterol (ADVAIR) 100-50 MCG/DOSE DISKUS Inhale 2 (Two) Times a Day.   Yes ProviderLuis MD   furosemide (Lasix) 20 MG tablet Take 1 tablet by mouth Daily. 2/4/21  Yes Mal Barboza MD   metoprolol tartrate (LOPRESSOR) 50 MG tablet Take 1.5 tablets by mouth Every 12 (Twelve) Hours. 2/4/21  Yes Nadira Heart APRN   montelukast (SINGULAIR) 10 MG tablet Take 10 mg by mouth Every Night.   Yes Luis Aiken MD   pioglitazone (ACTOS) 15 MG tablet Take 15 mg by mouth Daily.   Yes Luis Aiken MD   pravastatin (PRAVACHOL) 40 MG tablet Take 40 mg by mouth Daily.   Yes  "Provider, Luis, MD       No Known Allergies    Social History     Socioeconomic History   • Marital status:      Spouse name: Not on file   • Number of children: Not on file   • Years of education: Not on file   • Highest education level: Not on file   Tobacco Use   • Smoking status: Former Smoker   • Smokeless tobacco: Never Used   Substance and Sexual Activity   • Alcohol use: Yes     Alcohol/week: 3.0 standard drinks     Types: 3 Shots of liquor per week     Comment: burbon   • Drug use: No   • Sexual activity: Defer       Family History   Problem Relation Age of Onset   • Malig Hyperthermia Neg Hx        REVIEW OF SYSTEMS:   All systems reviewed.  Pertinent positives identified in HPI.  All other systems are negative.      Objective:     Vitals:    02/14/21 1756 02/14/21 1832 02/14/21 1957 02/14/21 2241   BP: 107/53 117/68 118/55 113/56   BP Location:  Right arm Right arm Right arm   Patient Position: Standing Sitting Sitting Sitting   Pulse: 86 64 60 58   Resp:  18 18 18   Temp:  97.5 °F (36.4 °C) 97.5 °F (36.4 °C) 97.5 °F (36.4 °C)   TempSrc:  Oral Oral Oral   SpO2:  100% 95% 100%   Weight:  72.2 kg (159 lb 3.2 oz)     Height:  172.7 cm (68\")       Body mass index is 24.21 kg/m².    General Appearance:    Alert, cooperative, in no acute distress   Head:    Normocephalic, without obvious abnormality, atraumatic   Eyes:            Lids and lashes normal, conjunctivae and sclerae normal, no icterus, no pallor, corneas clear, PERRLA   Ears:    Ears appear intact with no abnormalities noted   Throat:   No oral lesions, no thrush, oral mucosa moist   Neck:   No adenopathy, supple, trachea midline, no thyromegaly, no carotid bruit, no JVD   Back:     No kyphosis present, no scoliosis present, no skin lesions, erythema or scars, no tenderness to percussion or palpation, range of motion normal   Lungs:     Clear to auscultation, respirations regular, even and unlabored    Heart:   Regular, bradycardic, " normal S1 and S2, no murmur, no gallop, no rub, no click   Chest Wall:    No abnormalities observed   Abdomen:     Normal bowel sounds, no masses, no organomegaly, soft, nontender, nondistended, no guarding, no rebound  tenderness   Extremities:   Moves all extremities well, no edema, no cyanosis, no redness   Pulses:   Pulses palpable and equal bilaterally. Normal radial, carotid, femoral, dorsalis pedis and posterior tibial pulses bilaterally. Normal abdominal aorta   Skin:  Psychiatric:   No bleeding, bruising or rash    Alert and oriented x 3, normal mood and affect   Lab Review:     Results from last 7 days   Lab Units 02/15/21  0545 02/14/21  1428   SODIUM mmol/L 139 139   POTASSIUM mmol/L 4.8 4.3   CHLORIDE mmol/L 100 100   CO2 mmol/L 18.0* 22.8   BUN mg/dL 42* 31*   CREATININE mg/dL 3.44* 2.44*   CALCIUM mg/dL 9.2 9.6   BILIRUBIN mg/dL  --  1.1   ALK PHOS U/L  --  207*   ALT (SGPT) U/L  --  <5   AST (SGOT) U/L  --  34   GLUCOSE mg/dL 137* 147*     Results from last 7 days   Lab Units 02/15/21  0545 02/14/21  1428   TROPONIN T ng/mL 0.065* 0.055*     Results from last 7 days   Lab Units 02/15/21  0545   WBC 10*3/mm3 7.58   HEMOGLOBIN g/dL 10.6*   HEMATOCRIT % 34.3*   PLATELETS 10*3/mm3 206         Results from last 7 days   Lab Units 02/15/21  0545   MAGNESIUM mg/dL 1.8     Results from last 7 days   Lab Units 02/15/21  0545   CHOLESTEROL mg/dL 67   TRIGLYCERIDES mg/dL 78   HDL CHOL mg/dL 30*   LDL CHOL mg/dL 20             Telemetry          EKG    EKG 1/30/2021      I personally viewed and interpreted the patient's EKG/Telemetry data.  Sinus bradycardia      Assessment and Plan:         1.  Atrial fibrillation  2.  Acute renal failure  3.  Weakness and fatigue  4.  Sinus bradycardia    This is a 80-year-old man was recently hospitalized.  We increase his metoprolol due to intermittent rapid rates at the last hospitalization.  He wore a Holter monitor prior to that hospitalization in January which showed  basically a 94% burden with average heart rate of 126 and maximum heart rate of 162.  Minimum heart rate was 94.  Thus, we know he has intermittent rapid rates that should be treated.  I think he is maybe a little overtreated with metoprolol 75 twice daily and has reduced it to metoprolol 50 twice daily.  I do not think we can attribute all of his symptoms to bradycardia.  I am sure that his renal failure is also contributing to his weakness and fatigue.  I do not think he needs any further testing at this time.  I would suggest keeping him on telemetry monitoring his heart rates.  If he is having rapid rates along with symptomatic bradycardia we would have to consider an AV node ablation permanent pacemaker implant discussion with [facility].    Zoila Montoya MD  [facility]  02/15/21

## 2021-02-16 NOTE — PLAN OF CARE
Goal Outcome Evaluation:  Plan of Care Reviewed With: patient  Progress: no change  Outcome Summary: No acute changes overnight. A/Ox4. VSS. Remains on RA. NS @100. Ambulated to bathroom w/ assist x1 and cane. C/o nausea, refused Zofran. Wctm.

## 2021-02-16 NOTE — PROGRESS NOTES
Repeat labs reviewed.  Bicarb 7, K 6.7.  Lactic acid 12.5.  I Dw Dr. Gabriel Newberry and Dr. Barboza.  Transfer to ICU. Will need dialysis ASAP.  Concerned about vascular showering, possible aortic source versus just hypoperfusion .  Not stable for CT now.   Dw patient and wife. Explained need for ICU care and dialysis.

## 2021-02-16 NOTE — PLAN OF CARE
Goal Outcome Evaluation:  Plan of Care Reviewed With: patient  Progress: improving  Outcome Summary: Pt seen for PT eval this am. He was [facility] on [facility]/14 with increased weakness, bradycardia, and low BP. At baseline, pt lives at home with his wife and is normally independent with mobility and ADLS. Pt currently using a cane for ambulation. Today, pt presents with generalized weakness, increased fatitgue, decreased activity tolerance, and overall decreased functioanl mobility. Pt currently requiring CGA/min A with all bed mobility, CGA to stand, and CGA to ambulate approx 20 ft with Rwx. Pt limited by fatigue and decreased activity tolerance. He is hopeful to DC home, but may need rehab pending progress with therapy. He will continue to benefit from skilled PT to maximize safety, strength, endurance, and independence with mobility.  Patient was intermittently wearing a face mask during this therapy encounter. Therapist used appropriate personal protective equipment including eye protection, mask, and gloves.  Mask used was standard procedure mask. Appropriate PPE was worn during the entire therapy session. Hand hygiene was completed before and after therapy session. Patient is not in enhanced droplet precautions.

## 2021-02-16 NOTE — PROGRESS NOTES
"   LOS: 2 days    Patient Care Team:  Mal Barboza MD as PCP - General (Internal Medicine)    Chief Complaint:    Chief Complaint   Patient presents with   • Weakness - Generalized     Dr. Medina sent patinet in for c/o generalized weakness and lethargy ongoing for 3 weeks but worsened today.     Follow UP AARON  Subjective     Interval History:     Sigificant clinical worsening today. Hyperkalemic 6.9, serum bicarb 11 with AG 32.   No urine output this am, but bladder scans with less than 100 cc.  Denies pain.  Does feel SOA.   Renal US without hydronephrosis.  Pulse 44-50, not hypotensive .  No fever.   Review of Systems:   As noted above    Objective     Vital Signs  Temp:  [96.8 °F (36 °C)] 96.8 °F (36 °C)  Heart Rate:  [44-51] 44  Resp:  [14-24] 22  BP: (123-146)/(45-74) 146/58    Flowsheet Rows      First Filed Value   Admission Height  172.7 cm (68\") Documented at 02/14/2021 1318   Admission Weight  72.6 kg (160 lb) Documented at 02/14/2021 1318          I/O this shift:  In: 2000 [I.V.:1450; IV Piggyback:550]  Out: -   I/O last 3 completed shifts:  In: 358 [P.O.:358]  Out: -     Intake/Output Summary (Last 24 hours) at 2/16/2021 1347  Last data filed at 2/16/2021 1328  Gross per 24 hour   Intake 2358 ml   Output --   Net 2358 ml       Physical Exam:  General Appearance: fatigued.  Mild SOA with conversation.  Mask in place.   Skin: warm and dry  HEENT: pupils round and reactive to light,  nonicteric sclera  Neck: supple, no JVD, trachea midline  Lungs: Clear to auscultation, no wheezing.   Heart: RRR, normal S1 and S2, no S3, no rub  Abdomen: soft, nontender, + bs, slightly distended.   : no palpable bladder  Extremities: no edema, fingers and toes cyanotic.  Toes and feet cold.  Knees mottled.  Pulses radial and brachial dopplerable. I cannot doppler PT or DP in lower ext.    Neuro: flat affect.       Results Review:    Results from last 7 days   Lab Units 02/16/21  1045 02/15/21  0545 02/14/21  1428 "   SODIUM mmol/L 141 139 139   POTASSIUM mmol/L 6.9* 4.8 4.3   CHLORIDE mmol/L 98 100 100   CO2 mmol/L 11.0* 18.0* 22.8   BUN mg/dL 62* 42* 31*   CREATININE mg/dL 5.41* 3.44* 2.44*   CALCIUM mg/dL 8.8 9.2 9.6   BILIRUBIN mg/dL  --   --  1.1   ALK PHOS U/L  --   --  207*   ALT (SGPT) U/L  --   --  <5   AST (SGOT) U/L  --   --  34   GLUCOSE mg/dL 39* 137* 147*       Estimated Creatinine Clearance: 11.3 mL/min (A) (by C-G formula based on SCr of 5.41 mg/dL (H)).    Results from last 7 days   Lab Units 02/16/21  1045 02/16/21  0402 02/15/21  0545 02/14/21  1428   MAGNESIUM mg/dL  --  2.1 1.8 1.8   PHOSPHORUS mg/dL 8.8*  --  6.0*  --        Results from last 7 days   Lab Units 02/15/21  0545   URIC ACID mg/dL 6.6       Results from last 7 days   Lab Units 02/16/21  0402 02/15/21  0545 02/14/21  1428   WBC 10*3/mm3 11.17* 7.58 8.90   HEMOGLOBIN g/dL 12.0* 10.6* 11.6*   PLATELETS 10*3/mm3 199 206 248               Imaging Results (Last 24 Hours)     ** No results found for the last 24 hours. **        allopurinol, 100 mg, Oral, Daily  amantadine, 100 mg, Oral, Q12H  budesonide-formoterol, 2 puff, Inhalation, BID - RT  carbidopa-levodopa, 1 tablet, Oral, TID  cholecalciferol, 1,000 Units, Oral, Daily  docusate sodium, 100 mg, Oral, BID  ferrous sulfate, 325 mg, Oral, Daily With Breakfast  montelukast, 10 mg, Oral, Nightly  pravastatin, 40 mg, Oral, Daily  sodium chloride, 500 mL, Intravenous, Once  sodium chloride, 3 mL, Intravenous, Q12H      custom IV infusion builder,   sodium chloride, 150 mL/hr, Last Rate: 150 mL/hr (02/16/21 1323)        Medication Review:   Current Facility-Administered Medications   Medication Dose Route Frequency Provider Last Rate Last Admin   • acetaminophen (TYLENOL) tablet 650 mg  650 mg Oral Q4H PRN Marshall Medina MD        Or   • acetaminophen (TYLENOL) 160 MG/5ML solution 650 mg  650 mg Oral Q4H PRN Marshall Medina MD        Or   • acetaminophen (TYLENOL) suppository 650 mg  650 mg  Rectal Q4H PRN Marshall Medina MD       • allopurinol (ZYLOPRIM) tablet 100 mg  100 mg Oral Daily Marshall Medina MD   100 mg at 02/16/21 0942   • amantadine (SYMMETREL) capsule 100 mg  100 mg Oral Q12H Marshall Medina MD   100 mg at 02/16/21 0942   • budesonide-formoterol (SYMBICORT) 160-4.5 MCG/ACT inhaler 2 puff  2 puff Inhalation BID - RT Marshall Medina MD   2 puff at 02/16/21 0735   • calcium carbonate (TUMS) chewable tablet 500 mg (200 mg elemental)  2 tablet Oral TID PRN Marshall Medina MD       • carbidopa-levodopa (SINEMET)  MG per tablet 1 tablet  1 tablet Oral TID Marshall Medina MD   1 tablet at 02/16/21 0942   • cholecalciferol (VITAMIN D3) tablet 1,000 Units  1,000 Units Oral Daily Marshall Medina MD   1,000 Units at 02/16/21 0942   • dextrose (D50W) 25 g/ 50mL Intravenous Solution 50 mL  50 mL Intravenous Q1H PRN Mal Barboza MD   50 mL at 02/16/21 1328   • dextrose 5 % 900 mL with sodium bicarbonate 8.4 % 100 mEq infusion   Intravenous Continuous Tia Tanner MD       • docusate sodium (COLACE) capsule 100 mg  100 mg Oral BID Marshall Medina MD   100 mg at 02/16/21 0940   • ferrous sulfate tablet 325 mg  325 mg Oral Daily With Breakfast Marshall Medina MD   325 mg at 02/16/21 0942   • melatonin tablet 5 mg  5 mg Oral Nightly PRN Marshall Medina MD       • montelukast (SINGULAIR) tablet 10 mg  10 mg Oral Nightly Marshall Medina MD   10 mg at 02/15/21 2243   • nitroglycerin (NITROSTAT) SL tablet 0.4 mg  0.4 mg Sublingual Q5 Min PRN Marshall Medina MD       • ondansetron (ZOFRAN) injection 4 mg  4 mg Intravenous Q6H PRN Marshall Medina MD       • pravastatin (PRAVACHOL) tablet 40 mg  40 mg Oral Daily Marshall Medina MD   40 mg at 02/16/21 0941   • sodium chloride 0.9 % bolus 500 mL  500 mL Intravenous Once Mal Barboza MD 1,000 mL/hr at 02/16/21 1323 500 mL at 02/16/21 1323   • sodium chloride 0.9 % flush 10 mL  10 mL Intravenous PRN Marshall Medina  MD JUAN MANUEL       • sodium chloride 0.9 % flush 3 mL  3 mL Intravenous Q12H Marshall Medina MD   3 mL at 02/16/21 0943   • sodium chloride 0.9 % flush 3-10 mL  3-10 mL Intravenous PRN Marshall Medina MD       • sodium chloride 0.9 % infusion  150 mL/hr Intravenous Continuous Mal Barboza  mL/hr at 02/16/21 1323 150 mL/hr at 02/16/21 1323       Assessment/Plan   1. AARON with rapid rate of rise of waste products, hyperkalemia and anion gap metabolic acidemia ( unable to get ABG, but serum bicarb 11).  US negative for obstruction, may be a little bladder retention, agree with Hu. UA with protein, but few RBC so not really active to suggest GN.  Complements normal.   Poor perfusion as evidenced by acral  cyanosis  with bradycardia, but preserved bp. Volume status by exam looks euvolemic.   Received IVF overnight without improvement in renal function.  Meds reviewed, no clear nephrotoxins.   Serologic eval in progress.  Check CPK.   This am, anuric so far. Hu being placed. Start IVF with bicarb.   If chemistries not better will need dialysis today.    2. Afib, Bradycardia, acral cyanosis.  Echo yesterday with normal LV function and diastolic function.  Recheck troponin.  DC metoprolol.  Could he be showering emboli.  Asked Dr. Nino to review echo.  See below.   3. Hypoglycemia.  I dc'd the Actos.  Check liver function with profound low glucose.   4.  Anemia with recent epistaxis due to supra-therapeutic INR.  5. CAD    Plan:  1. Repeat chemistries now CMP, Lactic acid, CPK, troponin, LDH  2. Check Procalcitonin  3. I DW Cardiology.  Dr. Nino is going to look at his echo again to focus on the valves. He called back. Nothing on echo to suggest source of showering emboli.    4. Dc metoprolol  5. Dc actos  6. Dw Dr. Barboza  7. Hu  8. I think he needs ICU level care.   9. IVF with bicarb.   10. DW wife.         Tia Tanner MD  02/16/21  13:47 EST

## 2021-02-16 NOTE — NURSING NOTE
Lab called with critical blood glucose of 39 and potassium of 6.9.  POC accu check completed and blood sugar 28.  Amp of D50% pushed, per protocol and patient given 4 cartons of OJ.  STAT called placed to Dr. Barboza.  See new orders.  Dr. Barboza asked RN to call nephrologist with potassium order. STAT call placed to nephrologist.

## 2021-02-16 NOTE — PROGRESS NOTES
"     LOS: 3 days   Patient Care Team:  Mal Barboza MD as PCP - General  Mal Barboza MD as PCP - Family Medicine    Chief Complaint:   Chief Complaint   Patient presents with   • Weakness - Generalized     Dr. Medina sent patinet in for c/o generalized weakness and lethargy ongoing for 3 weeks but worsened today.         Subjective    Today the patient looks ill and fatigued.  He has no specific complaint other than \"I feel bad\".  He is anorexic as well.  He actually had an episode of hypoglycemia this morning.  He continues to be bradycardic but he is not complaining of lightheadedness or dizziness.  He is not complaining of chest pain or shortness of breath.  His O2 sat seem maintained on just room air.  Nonetheless he looks ill, somewhat cyanotic, and his hands and feet are cold.  Laboratory tests indicate a precipitous drop in his renal function.  He is being seen by nephrology but is not clear exactly what is going on.  His echocardiogram suggests he has good left ventricular function so I think he will tolerate IV fluid boluses and an increased IV fluid rate.    Interval History:     Patient Complaints: Fatigue and malaise along with anorexia  Patient Denies: He denies chest pain or abdominal pain.  No nausea or vomiting.  History taken from: patient chart family RN    Review of Systems:    Overall his review of system primarily revolves around his fatigue and malaise along with some anorexia.  He clearly feels bad and looks ill.  His fingertips are cold and cyanotic his feet are cold, bladder scan indicates no evidence for acute urinary retention, his monitor continues to show bradycardia but his blood pressure seems well maintained.  Noted he had this episode of hypoglycemia but that was quickly reversed.  Exactly what is going on is unclear but he certainly seems unstable.  Nonetheless he has no specific complaint as noted above.  He is not complaining of pain in his hands or feet or anywhere else " for that matter.    Objective      Vital Signs  Temp:  [98 °F (36.7 °C)-99.2 °F (37.3 °C)] 98.9 °F (37.2 °C)  Pulse:  [] 109  Resp:  [18] 18  BP: (111-154)/(55-97) 154/97    I/O'S  Intake & Output (last 7 days)       02/09 0701 - 02/10 0700 02/10 0701 - 02/11 0700 02/11 0701 - 02/12 0700 02/12 0701 - 02/13 0700 02/13 0701 - 02/14 0700 02/14 0701 - 02/15 0700 02/15 0701 - 02/16 0700 02/16 0701 - 02/17 0700    P.O.       358     I.V. (mL/kg)        950 (13.4)    Total Intake(mL/kg)       358 (5.1) 950 (13.4)    Net       +358 +950                Urine Unmeasured Occurrence      1 x      Stool Unmeasured Occurrence      1 x            Physical Exam:   General Appearance alert, pleasant, appears stated age, cooperative and looks much more ill and debilitated today.  Nonetheless he still oriented and conversant.  Lungs clear to auscultation, respirations regular, respirations even and respirations unlabored  Heart regular rhythm & normal rate, normal S1, S2, no murmur, no gallop, no rub and no click but he is markedly bradycardic again today.  Abdomen normal bowel sounds, no masses, no hepatomegaly, no splenomegaly, soft non-tender, no guarding and no rebound tenderness  Extremities cyanosisat his fingers and toes.  His fingers and toes also very cold.  Skin As noted his fingers and toes appear somewhat cyanotic     Results Review:     I reviewed the patient's new clinical results.  I reviewed the patient's other test results and agree with the interpretation                     Results from last 7 days   Lab Units 02/16/21  1045 02/15/21  0545 02/14/21  1428   SODIUM mmol/L 141 139 139   POTASSIUM mmol/L 6.9* 4.8 4.3   CHLORIDE mmol/L 98 100 100   CO2 mmol/L 11.0* 18.0* 22.8   BUN mg/dL 62* 42* 31*   CREATININE mg/dL 5.41* 3.44* 2.44*   GLUCOSE mg/dL 39* 137* 147*   CALCIUM mg/dL 8.8 9.2 9.6         Results from last 7 days   Lab Units 02/16/21  0402 02/15/21  0545 02/14/21  1428   WBC 10*3/mm3 11.17* 7.58 8.90    HEMOGLOBIN g/dL 12.0* 10.6* 11.6*   HEMATOCRIT % 39.2 34.3* 37.2*   PLATELETS 10*3/mm3 199 206 248         Results from last 7 days   Lab Units 02/16/21  0402 02/15/21  0545 02/14/21  1428   MAGNESIUM mg/dL 2.1 1.8 1.8     Results from last 7 days   Lab Units 02/15/21  0545   CHOLESTEROL mg/dL 67   TRIGLYCERIDES mg/dL 78   HDL CHOL mg/dL 30*   LDL CHOL mg/dL 20     Results from last 7 days   Lab Units 02/15/21  0545   PROBNP pg/mL 9,307.0*           Lab Results   Component Value Date    HGBA1C 6.04 (H) 02/15/2021    HGBA1C 6.20 (H) 02/01/2021     Glucose   Date/Time Value Ref Range Status   02/16/2021 1215 98 70 - 130 mg/dL Final   02/16/2021 1148 52 (L) 70 - 130 mg/dL Final   02/16/2021 1141 72 70 - 130 mg/dL Final   02/16/2021 1131 28 (C) 70 - 130 mg/dL Final   02/14/2021 2342 121 70 - 130 mg/dL Final             Medication Review:   Scheduled Meds:allopurinol, 100 mg, Oral, Daily  amantadine, 100 mg, Oral, Q12H  budesonide-formoterol, 2 puff, Inhalation, BID - RT  carbidopa-levodopa, 1 tablet, Oral, TID  cholecalciferol, 1,000 Units, Oral, Daily  docusate sodium, 100 mg, Oral, BID  ferrous sulfate, 325 mg, Oral, Daily With Breakfast  montelukast, 10 mg, Oral, Nightly  pravastatin, 40 mg, Oral, Daily  sodium chloride, 500 mL, Intravenous, Once  sodium chloride, 3 mL, Intravenous, Q12H      Continuous Infusions:sodium chloride, 150 mL/hr      PRN Meds:.•  acetaminophen **OR** acetaminophen **OR** acetaminophen  •  calcium carbonate  •  dextrose  •  melatonin  •  nitroglycerin  •  ondansetron  •  sodium chloride  •  sodium chloride              Patient Active Problem List   Diagnosis   • Closed right ankle fracture   • Poisoning by warfarin sodium   • Anemia   • Type 2 diabetes mellitus (CMS/HCC)   • Hypertension   • Hyperlipidemia   • Gout   • Parkinson disease (CMS/HCC)   • Anxiety   • Atrial fibrillation (CMS/HCC)   • 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)   •  Poor compliance with medication   • Acute kidney injury superimposed on chronic kidney disease (CMS/HCC)   • Acute gastrointestinal bleeding   • Nosebleed (Resolved)   • Melena   • Generalized weakness   • Elevated troponin   • Ataxia   • Volume depletion   • Bradycardia   • Cardiomegaly     #1 Fatigue and Malaise-he seems even more tired and fatigued today.  Is remarkably asymptomatic but he does look like he is much more ill today.    2.  Bradycardia-he continues to be bradycardic and I agree with the plans by the nephrologist to stop his metoprolol    3.  ASCVD-he is not complaining of any chest pain or any other cardiac-like symptoms at this point.    4.  Atrial fibrillation-he is on a number of medications for this and actually his heart rate seems to be persistently slow as noted above.  He had a 2D echocardiogram which showed relatively well-maintained left ventricular function.    5.  Diabetes mellitus type 2-an episode of hypoglycemia this morning and I agree with stopping his Actos.  We will follow his blood sugars very closely before every meal and nightly    6.  Anemia-he continues to be mildly anemic    7.  AK I/CKD 3-his renal function is precipitously declined in the last several days.  His fingers and toes are cold and he looks like he should be hypotensive although his blood pressure continues to be well-maintained.  In order to Hu catheter to be placed, normal saline bolus of fluid, and IV fluids.  Exactly what is going on with his renal function is not clear but hopefully nephrology can offer some insights.    8.  High cholesterol-generally well controlled on medication    9.  Parkinson's disease-well-controlled on medications with few signs at this point    10.  Hypertension-his blood pressures are relatively well controlled on modest medications    11.  Gout-he is on low-dose allopurinol has not had any further episodes    He seems to have decompensated even further today.  It may be that he  is actually volume depleted although that is hard to tell.  He certainly not tachycardic and hypotensive as would be expected but that may be confounded by medications.  Nephrology is seeing the patient and hopefully they can offer some insight as to why his renal function is declined so dramatically.        Plan for disposition:Where: home    Mal Barboza MD  02/16/21  13:12 EST          Time:

## 2021-02-16 NOTE — PROGRESS NOTES
Patient Name: Dustin Villeda  Patient : [id number]        Date of Service:21  Provider of Service: Himanshu Nino MD  Place of Service: [facility]  Referral Provider: Mal Barboza MD          Follow Up: Paroxysmal atrial fibrillation, bradycardic    Interval Hx: Patient still feeling poorly.  Still feeling rather weak and fatigued.  Heart rate generally remaining in the 50s.  No evidence of atrial fibrillation since admission.  Blood pressure stable.        OBJECTIVE  Temp:  [96.8 °F (36 °C)] 96.8 °F (36 °C)  Heart Rate:  [49-51] 50  Resp:  [14-18] 16  BP: (118-137)/(45-56) 137/45     Intake/Output Summary (Last 24 hours) at 2021 0933  Last data filed at 2021 0200  Gross per 24 hour   Intake 358 ml   Output --   Net 358 ml     Body mass index is 23.69 kg/m².      21  1832 02/15/21  1425 21  0406   Weight: 72.2 kg (159 lb 3.2 oz) 72.1 kg (159 lb) 70.7 kg (155 lb 12.8 oz)         Physical Exam:   Cardiovascular:      Bradycardia present. Regular rhythm.       Patient remains in bathroom for > 30 min    CURRENT MEDS    Scheduled Meds:allopurinol, 100 mg, Oral, Daily  amantadine, 100 mg, Oral, Q12H  budesonide-formoterol, 2 puff, Inhalation, BID - RT  carbidopa-levodopa, 1 tablet, Oral, TID  cholecalciferol, 1,000 Units, Oral, Daily  docusate sodium, 100 mg, Oral, BID  ferrous sulfate, 325 mg, Oral, Daily With Breakfast  metoprolol tartrate, 50 mg, Oral, Q12H  montelukast, 10 mg, Oral, Nightly  pioglitazone, 15 mg, Oral, Daily  pravastatin, 40 mg, Oral, Daily  sodium chloride, 3 mL, Intravenous, Q12H      Continuous Infusions:sodium chloride, 75 mL/hr, Last Rate: 75 mL/hr (02/15/21 6913)          Lab Review:   Results from last 7 days   Lab Units 02/15/21  0545 21  1428   SODIUM mmol/L 139 139   POTASSIUM mmol/L 4.8 4.3   CHLORIDE mmol/L 100 100   CO2 mmol/L 18.0* 22.8   BUN mg/dL 42* 31*   CREATININE mg/dL 3.44* 2.44*   GLUCOSE mg/dL 137* 147*    CALCIUM mg/dL 9.2 9.6   AST (SGOT) U/L  --  34   ALT (SGPT) U/L  --  <5     Results from last 7 days   Lab Units 02/15/21  0545 02/14/21  1428   TROPONIN T ng/mL 0.065* 0.055*     Results from last 7 days   Lab Units 02/16/21  0402 02/15/21  0545   WBC 10*3/mm3 11.17* 7.58   HEMOGLOBIN g/dL 12.0* 10.6*   HEMATOCRIT % 39.2 34.3*   PLATELETS 10*3/mm3 199 206         Results from last 7 days   Lab Units 02/16/21  0402 02/15/21  0545   MAGNESIUM mg/dL 2.1 1.8     Results from last 7 days   Lab Units 02/15/21  0545   CHOLESTEROL mg/dL 67   TRIGLYCERIDES mg/dL 78   HDL CHOL mg/dL 30*   LDL CHOL mg/dL 20     Results from last 7 days   Lab Units 02/15/21  0545   PROBNP pg/mL 9,307.0*     Results from last 7 days   Lab Units 02/15/21  0545   TSH uIU/mL 4.370*       I personally reviewed the patient's ECG and telemetry data    ASSESSMENT & PLAN    Elevated troponin    Anemia    Type 2 diabetes mellitus (CMS/[facility])    Hypertension    Hyperlipidemia    Gout    Parkinson disease (CMS/[facility])    Anxiety    Atrial fibrillation (CMS/[facility])    Coronary artery disease involving native coronary artery with angina pectoris (CMS/[facility])    Acute kidney injury superimposed on chronic kidney disease (CMS/HCC)    Generalized weakness    Ataxia    Volume depletion    Bradycardia    Cardiomegaly      1.  Paroxysmal atrial fibrillation: Patient has what appears to be sick sinus syndrome with intermittent atrial fibrillation with tachycardia.  At present his metoprolol has been decreased but his rate remains relatively low in the 50s.  It may take some time for his rate to increase.  Continue to monitor.  Presently he is off anticoagulation.  Resume when able  2.  Weakness and fatigue: Multifactorial  3.  Acute kidney injury: Per nephrology.  Present being hydrated.          Himanshu Nino MD  02/16/21

## 2021-02-16 NOTE — THERAPY EVALUATION
Patient Name: Dustin Villeda  : 1940    MRN: [id number]                              Today's Date: 2021       Admit Date: 2021    Visit Dx:     ICD-10-CM ICD-9-CM   1. Generalized weakness  R53.1 780.79   2. Renal insufficiency  N28.9 593.9   3. Elevated troponin  R77.8 790.6   4. Bradycardia  R00.1 427.89     Patient Active Problem List   Diagnosis   • Closed right ankle fracture   • Poisoning by warfarin sodium   • Anemia   • Type 2 diabetes mellitus (CMS/HCC)   • Hypertension   • Hyperlipidemia   • Gout   • Parkinson disease (CMS/HCC)   • Anxiety   • Atrial fibrillation (CMS/HCC)   • 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)   • Poor compliance with medication   • Acute kidney injury superimposed on chronic kidney disease (CMS/HCC)   • Acute gastrointestinal bleeding   • Nosebleed (Resolved)   • Melena   • Generalized weakness   • Elevated troponin   • Ataxia   • Volume depletion   • Bradycardia   • Cardiomegaly     Past Medical History:   Diagnosis Date   • Ankle fracture     RIGHT ANKLE    • Anxiety     ABOUT CURRENT HEALTH SITUATION    • Atrial fibrillation (CMS/HCC)    • Constipation due to opioid therapy     NO BOWEL MOVEMENT IN 5 DAYS   • Coronary artery disease    • Diabetes mellitus (CMS/HCC)    • Gout     BIG TOES   • Hyperlipidemia    • Hypertension    • Iron deficiency anemia    • Parkinson disease (CMS/HCC)    • Supratherapeutic INR      Past Surgical History:   Procedure Laterality Date   • ANKLE OPEN REDUCTION INTERNAL FIXATION Right 2019    Procedure: OPEN REDUCTION INTERNAL FIXATION RIGHT ANKLE;  Surgeon: Evgeny Keith II, MD;  Location: [facility];  Service: Orthopedics   • CATARACT EXTRACTION EXTRACAPSULAR W/ INTRAOCULAR LENS IMPLANTATION Bilateral    • COLONOSCOPY     • CORONARY ANGIOPLASTY WITH STENT PLACEMENT     • ENDOSCOPY  2021    Procedure: ESOPHAGOGASTRODUODENOSCOPY AT BEDSIDE;  Surgeon:  Geoff Rosenthal MD;  Location: [facility] ENDOSCOPY;  Service: Gastroenterology;;  PRE OP - BLACK STOOLS  POST OP - CANDIDA   • EXTRACORPOREAL SHOCK WAVE LITHOTRIPSY (ESWL)      OVER  10 YEARS AGO   • PILONIDAL CYSTECTOMY  1964     General Information     Row Name 02/16/21 0851          Physical Therapy Time and Intention    Document Type  evaluation  -EJ     Mode of Treatment  physical therapy  -EJ     Row Name 02/16/21 0851          General Information    Patient Profile Reviewed  yes  -EJ     Prior Level of Function  independent:;ADL's;all household mobility  -EJ     Existing Precautions/Restrictions  no known precautions/restrictions  -EJ     Barriers to Rehab  none identified  -EJ     Row Name 02/16/21 0851          Living Environment    Lives With  spouse  -EJ     Row Name 02/16/21 0851          Home Main Entrance    Number of Stairs, Main Entrance  five  -EJ     Row Name 02/16/21 0851          Cognition    Orientation Status (Cognition)  oriented x 3  -EJ     Row Name 02/16/21 0851          Safety Issues, Functional Mobility    Impairments Affecting Function (Mobility)  endurance/activity tolerance;strength  -EJ       User Key  (r) = Recorded By, (t) = Taken By, (c) = Cosigned By    Initials Name Provider Type    EJ Lily Allan, PT Physical Therapist        Mobility     Row Name 02/16/21 0852          Bed Mobility    Bed Mobility  supine-sit;sit-supine  -EJ     Supine-Sit [place] (Bed Mobility)  verbal cues;minimum assist (75% patient effort)  -EJ     Sit-Supine [place] (Bed Mobility)  verbal cues;minimum assist (75% patient effort)  -     Assistive Device (Bed Mobility)  bed rails;head of bed elevated  -     Row Name 02/16/21 0852          Sit-Stand Transfer    Sit-Stand [place] (Transfers)  verbal cues;contact guard  -     Assistive Device (Sit-Stand Transfers)  walker, front-wheeled  -     Row Name 02/16/21 0852          Gait/Stairs (Locomotion)    [place] Level (Gait)   verbal cues;nonverbal cues (demo/gesture);contact guard;minimum assist (75% patient effort)  -EJ     Assistive Device (Gait)  walker, front-wheeled  -EJ     Distance in Feet (Gait)  20  -EJ     Deviations/Abnormal Patterns (Gait)  sandeep decreased;stride length decreased;festinating/shuffling  -EJ     Bilateral Gait Deviations  forward flexed posture;heel strike decreased  -EJ     Comment (Gait/Stairs)  slow pace, cues for increased B step length, limited by generalized weakness and fatigue  -EJ       User Key  (r) = Recorded By, (t) = Taken By, (c) = Cosigned By    Initials Name Provider Type    Lily Gutierrez, PT Physical Therapist        Obj/Interventions     Row Name 02/16/21 0854          Range of Motion Comprehensive    General Range of Motion  no range of motion deficits identified  -     Row Name 02/16/21 0854          Strength Comprehensive (MMT)    Comment, General Manual Muscle Testing (MMT) Assessment  generalized weakness  -     Row Name 02/16/21 0854          Motor Skills    Therapeutic Exercise  -- seated BLE ther ex x 10 reps: AP, LAQ, and MIP  -EJ     Row Name 02/16/21 0854          Balance    Static Sitting Balance  WFL;sitting, edge of bed  -EJ     Static Standing Balance  WFL;supported  -EJ       User Key  (r) = Recorded By, (t) = Taken By, (c) = Cosigned By    Initials Name Provider Type    Lily Gutierrez, PT Physical Therapist        Goals/Plan     Row Name 02/16/21 0858          Bed Mobility Goal 1 (PT)    Activity/Assistive Device (Bed Mobility Goal 1, PT)  bed mobility activities, all  -EJ     [place] Level/Cues Needed (Bed Mobility Goal 1, PT)  standby assist  -EJ     Time Frame (Bed Mobility Goal 1, PT)  1 week  -EJ     Row Name 02/16/21 0858          Transfer Goal 1 (PT)    Activity/Assistive Device (Transfer Goal 1, PT)  transfers, all;walker, rolling  -EJ     [place] Level/Cues Needed (Transfer Goal 1, PT)  standby assist  -EJ     Time Frame (Transfer Goal  1, PT)  1 week  -     Row Name 02/16/21 0858          Gait Training Goal 1 (PT)    Activity/Assistive Device (Gait Training Goal 1, PT)  gait (walking locomotion);walker, rolling  -EJ     Distance (Gait Training Goal 1, PT)  150  -EJ     Time Frame (Gait Training Goal 1, PT)  1 week  -EJ       User Key  (r) = Recorded By, (t) = Taken By, (c) = Cosigned By    Initials Name Provider Type    EJ Lily Allan, PT Physical Therapist        Clinical Impression     Row Name 02/16/21 0855          Pain    Additional Documentation  Pain Scale: Numbers Pre/Post-Treatment (Group)  -     Row Name 02/16/21 0869          Pain Scale: Numbers Pre/Post-Treatment    Pretreatment Pain Rating  0/10 - no pain  -EJ     Posttreatment Pain Rating  0/10 - no pain  -     Row Name 02/16/21 0845          Plan of Care Review    Plan of Care Reviewed With  patient  -EJ     Progress  improving  -EJ     Outcome Summary  Pt seen for PT eval this am. He was admitted to [facility] on 2/14 with increased weakness, bradycardia, and low BP. At baseline, pt lives at home with his wife and is normally independent with mobility and ADLS. Pt currently using a cane for ambulation. Today, pt presents with generalized weakness, increased fatitgue, decreased activity tolerance, and overall decreased functioanl mobility. Pt currently requiring CGA/min A with all bed mobility, CGA to stand, and CGA to ambulate approx 20 ft with Rwx. Pt limited by fatigue and decreased activity tolerance. He is hopeful to DC home, but may need rehab pending progress with therapy. He will continue to benefit from skilled PT to maximize safety, strength, endurance, and independence with mobility.  -     Row Name 02/16/21 0879          Therapy Assessment/Plan (PT)    Patient/Family Therapy Goals Statement (PT)  go home  -EJ     Rehab Potential (PT)  good, to achieve stated therapy goals  -     Criteria for Skilled Interventions Met (PT)  yes  -     Row Name 02/16/21 0807           Positioning and Restraints    Pre-Treatment Position  in bed  -EJ     Post Treatment Position  bed  -EJ     In Bed  notified nsg;supine;call light within reach;encouraged to call for assist;exit alarm on  -EJ       User Key  (r) = Recorded By, (t) = Taken By, (c) = Cosigned By    Initials Name Provider Type     Lily Allan, PT Physical Therapist        Outcome Measures     Row Name 02/16/21 0859          How much help from another person do you currently need...    Turning from your back to your side while in flat bed without using bedrails?  3  -EJ     Moving from lying on back to sitting on the side of a flat bed without bedrails?  3  -EJ     Moving to and from a bed to a chair (including a wheelchair)?  3  -EJ     Standing up from a chair using your arms (e.g., wheelchair, bedside chair)?  3  -EJ     Climbing 3-5 steps with a railing?  2  -EJ     To walk in hospital room?  3  -EJ     AM-PAC 6 Clicks Score (PT)  17  -EJ     Row Name 02/16/21 0859          Functional Assessment    Outcome Measure Options  AM-PAC 6 Clicks Basic Mobility (PT)  -EJ       User Key  (r) = Recorded By, (t) = Taken By, (c) = Cosigned By    Initials Name Provider Type    Lily Gutierrez, PT Physical Therapist        Physical Therapy Education                 Title: PT OT SLP Therapies (In Progress)     Topic: Physical Therapy (In Progress)     Point: Mobility training (Done)     Learning Progress Summary           Patient Acceptance, E,TB,D, VU,NR by  at 2/16/2021 0859                   Point: Home exercise program (Not Started)     Learner Progress:  Not documented in this visit.          Point: Body mechanics (Not Started)     Learner Progress:  Not documented in this visit.          Point: Precautions (Not Started)     Learner Progress:  Not documented in this visit.                      User Key     Initials Effective Dates Name Provider Type [facility] 04/03/18 -  Lily Allan, PT Physical Therapist  PT              PT Recommendation and Plan  Planned Therapy Interventions (PT): balance training, bed mobility training, gait training, home exercise program, patient/family education, strengthening, stair training, ROM (range of motion), transfer training  Plan of Care Reviewed With: patient  Progress: improving  Outcome Summary: Pt seen for PT eval this am. He was admitted to [facility] on 2/14 with increased weakness, bradycardia, and low BP. At baseline, pt lives at home with his wife and is normally independent with mobility and ADLS. Pt currently using a cane for ambulation. Today, pt presents with generalized weakness, increased fatitgue, decreased activity tolerance, and overall decreased functioanl mobility. Pt currently requiring CGA/min A with all bed mobility, CGA to stand, and CGA to ambulate approx 20 ft with Rwx. Pt limited by fatigue and decreased activity tolerance. He is hopeful to DC home, but may need rehab pending progress with therapy. He will continue to benefit from skilled PT to maximize safety, strength, endurance, and independence with mobility.     Time Calculation:   PT Charges     Row Name 02/16/21 0900             Time Calculation    Start Time  0825  -EJ      Stop Time  0848  -EJ      Time Calculation (min)  23 min  -EJ      PT Received On  02/16/21  -EJ      PT - Next Appointment  02/17/21  -EJ      PT Goal Re-Cert Due Date  02/23/21  -EJ         Time Calculation- PT    Total Timed Code Minutes- PT  14 minute(s)  -EJ        User Key  (r) = Recorded By, (t) = Taken By, (c) = Cosigned By    Initials Name Provider Type    EJ Lily Allan, PT Physical Therapist        Therapy Charges for Today     Code Description Service Date Service Provider Modifiers Qty    08891490762 HC PT EVAL MOD COMPLEXITY 2 2/16/2021 Lily Allan, PT GP 1    94486080607 HC PT THER PROC EA 15 MIN 2/16/2021 Lily Allan, PT GP 1          PT G-Codes  Outcome Measure Options: AM-PAC 6 Clicks Basic  Mobility (PT)  AM-PAC 6 Clicks Score (PT): 17  AM-PAC 6 Clicks Score (OT): 20    Lily Allan, PT  2/16/2021

## 2021-02-16 NOTE — PROCEDURES
"Non-Tunneled Catheter    Date/Time: 2/16/2021 5:12 PM  Performed by: Sourav Lyons MD  Authorized by: Sourav Lyons MD   Consent: Verbal consent obtained. Written consent obtained.  Risks and benefits: risks, benefits and alternatives were discussed  Procedure consent: procedure consent matches procedure scheduled  Imaging studies: imaging studies available  Patient identity confirmed: arm band  Time out: Immediately prior to procedure a \"time out\" was called to verify the correct patient, procedure, equipment, support staff and site/side marked as required.  Indications: vascular access  Anesthesia: local infiltration    Anesthesia:  Local Anesthetic: lidocaine 1% without epinephrine  Anesthetic total: 4 mL    Sedation:  Patient sedated: no    Preparation: skin prepped with 2% chlorhexidine  Skin prep agent dried: skin prep agent completely dried prior to procedure  Sterile barriers: all five maximum sterile barriers used - cap, mask, sterile gown, sterile gloves, and large sterile sheet  Hand hygiene: hand hygiene performed prior to central venous catheter insertion  Location details: right femoral  Site selection rationale: hyperkalemia  Patient position: flat  Catheter type: double lumen  Catheter size: 14 Fr  Ultrasound guidance: yes  Number of attempts: 1  Successful placement: yes  Post-procedure: line sutured and dressing applied  Assessment: blood return through all ports  Patient tolerance: Patient tolerated the procedure well with no immediate complications        "

## 2021-02-16 NOTE — CONSULTS
[facility]    Reason for consult: critical care management    HPI:  Mr. Villeda is a 79yo WM admitted 2/14 with weakness and shortness of breath, gradual onset, progressively worsening and worse with exertion.  He was found to be bradycardic and to have AARON.  Prior to this admission he had actually been tachycardic and was started on metoprolol for this.  His metoprolol has since been discontinued by cardiology but his HR remains low.  He has iv fluids and nephrology has been following but his renal function has continued to worsen and he has developed hyperkalemia.  His systolic and diastolic function looks pretty normal on echo.  He has developed hypoglycemia and has mottling of extremities with lactic acidosis despite normal bp. Transfer to the ICU for stat dialysis and further management.       Past Medical History:   Diagnosis Date   • Ankle fracture     RIGHT ANKLE    • Anxiety     ABOUT CURRENT HEALTH SITUATION    • Atrial fibrillation (CMS/HCC)    • Constipation due to opioid therapy     NO BOWEL MOVEMENT IN 5 DAYS   • Coronary artery disease    • Diabetes mellitus (CMS/HCC)    • Gout     BIG TOES   • Hyperlipidemia    • Hypertension    • Iron deficiency anemia    • Parkinson disease (CMS/HCC)    • Supratherapeutic INR      Social History     Socioeconomic History   • Marital status:      Spouse name: Not on file   • Number of children: Not on file   • Years of education: Not on file   • Highest education level: Not on file   Tobacco Use   • Smoking status: Former Smoker   • Smokeless tobacco: Never Used   Substance and Sexual Activity   • Alcohol use: Yes     Alcohol/week: 3.0 standard drinks     Types: 3 Shots of liquor per week     Comment: rickey   • Drug use: No   • Sexual activity: Defer     Family History   Problem Relation Age of Onset   • Malig Hyperthermia Neg Hx      MEDS: reviewed as per chart  ALL: NKDA  ROS: 12 point negative except as in HPI    Vital Sign Min/Max for last  24 hours  Temp  Min: 96.8 °F (36 °C)  Max: 96.8 °F (36 °C)   BP  Min: 123/55  Max: 146/58   Pulse  Min: 44  Max: 51   Resp  Min: 14  Max: 24   SpO2  Min: 90 %  Max: 100 %   No data recorded   Weight  Min: 70.7 kg (155 lb 12.8 oz)  Max: 70.7 kg (155 lb 12.8 oz)     GEN:   appears ill,  AxOx3 --but slightly confused on deeper conversation  HEENT: PERRL, EOMI, no icterus, mmm, no jvd, trachea midline, neck supple  CHEST: CTA bilat, no wheezes, no crackles, no use of accessory muscles  CV: kelly, regular, no m/g/r  ABD: soft, nt, nd +bs, no hepatosplenomegaly  EXT: no clubbing or edema. Cyanosis of the extremities.  SKIN: no rashes, no xanthomas, decreased turgor, cool extremities, dry,  -- mottling of the knees and extremities  LYMPH: no palpable cervical or supraclavicular lymphadenopathy  NEURO: CN 2-12 intact and symmetric bilaterally  PSYCH: nl affect, nl orientation, nl judgement, nl mood   MSK: No kyphoscoliosis, 5/5 strength ue and le bilaterally  He has palpable pulses bilateral femoral pulses    Labs: 2/16: reviewed:  U/a: bact 1+;wbc 13-20;   Glucose 236  Bun 62  Cr 5.53  Na 138  k 6.7  Bicarb 7.7  Lactate 12.7    Alt 42  ast 368  Alk phos 174  tbili 1.4  Trop 0.066  procal 0.41  Ck 1474    Wbc 11 (86%neutrophils)  hgb 12  plts 199    2/15: renal ultrasound: normal  2/14: CXR: reviewed: cadiomegagly no acute disease    A/P:  1. AARON --discussed with Dr. Tanner -- unclear reason for his AARON at this point.  Certainly has worsened rapidly.  Question if he is showering emboli as he has PAF and has been off his anticoagultion given recent admission for inr>10 with nosebleed.  His urinalysis is not particularly active.  He is going to need stat dialysis at this point given his hyperkalemia  2. Lactic acidosis -- unclear source, again showering emboli is a concern, will try and get a ct abd/pelvis once he is more stable  He does meet criteria for sepsis will go ahead and start antibiotics for now.  3. Bradycardia --  cardiology notes likely sick sinus syndrome.  His bradycardia seems inappropriate for how ill he is, will check a cortisol level. He did have some hypoglycemia as well  4. PAF -- has not had afib during this admission, cards following  5. Hypoglycemia -- treat as needed, check cortisol,   6. Elevated liver enzymes -- will check hepatitis panel  7. Parkinson's disease -- on home meds, hold amantadine for now given renal failure  8. Sepsis -- will go ahead and start antibiotics, but I am not convinced he is septic, he actually is behaving more like cardiogenic shock?    Picture is a bit confusing.  I discussed with Dr. viarmontes before seeming patient and again after.  I discussed with Dr. Lyons as well.    Will plan imaging of the abd/pelvis post control of K and then further dialysis after imaging.    Discussed with RN and with wife as well.     CC 65 mins

## 2021-02-16 NOTE — PROGRESS NOTES
Patient now in ICU. Right femoral shiley placed.  Dialysis RN here.  I think that in order to make sure he does not have vascular occlusive disease or potentially mesenteric or renal ischemia, CTA is warranted despite the risk of contrast in this patient.    Will dialyze to correct hyperkalemia and acidemia,  Get CTA, then start CRRT overnight to maintain K and keep acidemia under control. I have explained to patient and wife.

## 2021-02-16 NOTE — SIGNIFICANT NOTE
02/16/21 1241   OTHER   Discipline occupational therapist   Rehab Time/Intention   Session Not Performed other (see comments)  (OT-RN said to hold tx. today due to blood sugar problems. OT to follow up next service date.)   Recommendation   OT - Next Appointment 02/17/21

## 2021-02-16 NOTE — PLAN OF CARE
Problem: Adult Inpatient Plan of Care  Goal: Plan of Care Review  Outcome: Ongoing, Progressing  Flowsheets (Taken 2/16/2021 1851)  Outcome Summary: Transfered to ICU for STAT shiley placement and STAT dialysis. Shiley placed and HD in room at this time setting up. CT angiogram planned after HD completed. Then pt to go on CRRT.

## 2021-02-17 ENCOUNTER — INPATIENT HOSPITAL (OUTPATIENT)
Dept: URBAN - METROPOLITAN AREA HOSPITAL 113 | Facility: HOSPITAL | Age: 81
End: 2021-02-17

## 2021-02-17 DIAGNOSIS — R53.83 OTHER FATIGUE: ICD-10-CM

## 2021-02-17 DIAGNOSIS — I49.9 CARDIAC ARRHYTHMIA, UNSPECIFIED: ICD-10-CM

## 2021-02-17 DIAGNOSIS — R94.5 ABNORMAL RESULTS OF LIVER FUNCTION STUDIES: ICD-10-CM

## 2021-02-17 PROBLEM — D68.9 COAGULOPATHY (HCC): Status: ACTIVE | Noted: 2021-01-01

## 2021-02-17 PROBLEM — D64.9 ANEMIA: Status: ACTIVE | Noted: 2021-01-01

## 2021-02-17 PROBLEM — D69.6 THROMBOCYTOPENIA (HCC): Status: ACTIVE | Noted: 2021-01-01

## 2021-02-17 PROCEDURE — 99222 1ST HOSP IP/OBS MODERATE 55: CPT | Performed by: INTERNAL MEDICINE

## 2021-02-17 NOTE — PROGRESS NOTES
[facility]     Mar/chart reviewed  Follow up critical care management  Confusion overnight  No abdominal pain or chest pain    Vital Sign Min/Max for last 24 hours  Temp  Min: 95.9 °F (35.5 °C)  Max: 98.6 °F (37 °C)   BP  Min: 89/73  Max: 148/81   Pulse  Min: 44  Max: 53   Resp  Min: 18  Max: 24   SpO2  Min: 87 %  Max: 100 %   Flow (L/min)  Min: 2  Max: 2   Weight  Min: 74.4 kg (164 lb 0.4 oz)  Max: 74.4 kg (164 lb 0.4 oz)   6897/1633    Appears ill,  Sleepy, confused  perrl, eomi, normal sclera, no palpable thyroid nodules  mmm, no jvd, trachea midline, neck supple,  chest cta bilaterally, no crackles, no wheezes,   bradycardia  soft, nt, nd +bs,  no /c/ e acral cyanosis,worsening  Skin warm, dry , mottling    Labs: 2/17: reviewed:  Glucose 139  Bun 26  Cr 2.46  Na 135  Bicarb 18.8  Alt 217  ast 1185  Alk phos 170  tbili 1.5  Lactate 10.2  procal 0.53  INR >10  Wbc 6.75  hgb 10.9  plts 115  Albumin 2.8  Cortisol 44    Ct abd/pelvis: largely unremarkable: mild anasarca, difuse sclerosis of L5 vertebral body  Ct angio head; negative    A/P:  1. AARON -- the prescence of confusion, coagulopathy and liver dysfunction raises the question of HUS?vasculitis? Agree with having hematology evaluate him  2. Lactic acidosis -- must be small vessel changes resulting in this? As his bp has been good and there is no evidence of ischemic bowel on ct imaging  3. Acute liver injury -- unclear etiology? Will have GI evaluate  Will check ammonia given the confusion  4. Sepsis -- no clear source and really wbc count is normal, procal is only mildly elevated and he is afebrile but would err on the side of caution and continue antibiotics for now.  5. Coagulopathy -- was admitted a few weeks ago with coagulopathy as well  6. Encephalopathy  7. Parkinson's disease  8. Anemia  9. 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      CC 45 mins.

## 2021-02-17 NOTE — SIGNIFICANT NOTE
02/17/21 1330   OTHER   Discipline physical therapist   Rehab Time/Intention   Session Not Performed unable to treat, medical status change  (Pt has experienced a medical status change since last PT session. Pt is now on CRRT in the ICU. RN Rita suggests PT sign off at this time. Please enter new PT order if/when pt is able to participate in activity.)

## 2021-02-17 NOTE — CONSULTS
[facility]   Consult Note    Patient Name: Dustin Villeda  : 1940  MRN: [id number]  Primary Care Physician: Mal Barboza MD  Referring Physician: Mal Barboza MD  Date of admission: 2021    Subjective   Subjective     Reason for Consult/ Chief Complaint: increased liver function tests     History of Present Illness  Unfortunate gentleman who presented  with weakness and bradycardia.   Has had hypotension and has been in the ICU.  He has had significant increase in liver function tests and coagulopathy.  His wife said he is confused.  He has no know liver disease prior to admission but she states he does consume a few mixed drinks each evening ( done so for years)   Ct noted hepatic granulomas.     Review of Systems   Constitutional: Positive for appetite change, fatigue and unexpected weight change.   Musculoskeletal: Positive for arthralgias, back pain and myalgias.   Neurological: Positive for weakness.        Personal History     Past Medical History:   Diagnosis Date   • Ankle fracture     RIGHT ANKLE    • Anxiety     ABOUT CURRENT HEALTH SITUATION    • Atrial fibrillation (CMS/HCC)    • Constipation due to opioid therapy     NO BOWEL MOVEMENT IN 5 DAYS   • Coronary artery disease    • Diabetes mellitus (CMS/HCC)    • Gout     BIG TOES   • Hyperlipidemia    • Hypertension    • Iron deficiency anemia    • Parkinson disease (CMS/HCC)    • Supratherapeutic INR        Past Surgical History:   Procedure Laterality Date   • ANKLE OPEN REDUCTION INTERNAL FIXATION Right 2019    Procedure: OPEN REDUCTION INTERNAL FIXATION RIGHT ANKLE;  Surgeon: Evgeny Keith II, MD;  Location: [facility];  Service: Orthopedics   • CATARACT EXTRACTION EXTRACAPSULAR W/ INTRAOCULAR LENS IMPLANTATION Bilateral    • COLONOSCOPY     • CORONARY ANGIOPLASTY WITH STENT PLACEMENT     • ENDOSCOPY  2021    Procedure: ESOPHAGOGASTRODUODENOSCOPY AT BEDSIDE;  Surgeon: Geoff Rosenthal,  MD;  Location: [facility] ENDOSCOPY;  Service: Gastroenterology;;  PRE OP - BLACK STOOLS  POST OP - CANDIDA   • EXTRACORPOREAL SHOCK WAVE LITHOTRIPSY (ESWL)      OVER  10 YEARS AGO   • PILONIDAL CYSTECTOMY  1964       Family History: family history is not on file. Otherwise pertinent FHx was reviewed and not pertinent to current issue.    Social History:  reports that he has quit smoking. He has never used smokeless tobacco. He reports current alcohol use of about 3.0 standard drinks of alcohol per week. He reports that he does not use drugs.    Home Medications:  allopurinol, amantadine, apixaban, carbidopa-levodopa, cholecalciferol, ezetimibe, ferrous sulfate, fluticasone-salmeterol, furosemide, metoprolol tartrate, montelukast, pioglitazone, and pravastatin      Allergies:  No Known Allergies    Objective    Objective   Vitals:  Temp:  [95.9 °F (35.5 °C)-98.6 °F (37 °C)] 97.3 °F (36.3 °C)  Heart Rate:  [44-53] 50  Resp:  [18-24] 23  BP: ()/(37-98) 104/53  Flow (L/min):  [2] 2    Physical Exam  Constitutional:       Appearance: He is ill-appearing.   HENT:      Right Ear: External ear normal.      Left Ear: External ear normal.      Nose: Nose normal.      Mouth/Throat:      Pharynx: Oropharynx is clear.   Eyes:      Conjunctiva/sclera: Conjunctivae normal.   Cardiovascular:      Rate and Rhythm: Regular rhythm.   Pulmonary:      Breath sounds: Wheezing and rales present.   Abdominal:      General: Abdomen is flat.      Palpations: Abdomen is soft.   Skin:     General: Skin is warm and dry.   Neurological:      Mental Status: He is lethargic.      Motor: Weakness present.   Psychiatric:         Cognition and Memory: Cognition is impaired.         Result Review    Result Review:  I have personally reviewed the results from the time of this admission to 02/17/21 11:32 AM EST and agree with these findings:  [x]  Laboratory  []  Microbiology  [x]  Radiology  []  EKG/Telemetry   []  Cardiology/Vascular   [x]   Pathology  []  Old records  []  Other:  Most notable findings include: disoriented, ill appearance   Assessment/Plan   Assessment / Plan     Brief Patient Summary:  Dustin Villeda is a 80 y.o. male who presents sudden increase in tranaminaases, pattern suggestive of hepatic ischemia.  He certainly could have element of underlying liver disease ( SERRANO 0 as well.      Active Hospital Problems:  Active Hospital Problems    Diagnosis   • **Elevated troponin   • Generalized weakness   • Ataxia   • Volume depletion   • Bradycardia   • Cardiomegaly   • Type 2 diabetes mellitus (CMS/HCC)   • Hypertension   • Hyperlipidemia   • Gout     BIG TOES     • Parkinson disease (CMS/HCC)   • Anxiety     ABOUT CURRENT HEALTH SITUATION      • Atrial fibrillation (CMS/HCC)   • Acute kidney injury superimposed on chronic kidney disease (CMS/HCC)   • 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)   • Anemia     Added automatically from request for surgery [id number]         Plan: agree with hematology work up on the coagulopathy  Reviewed with wife, care is largely supportive and his liver should recover.  Given hepatic granulomas, will check some autoimmune markers, but this pattern of injury is most consider with ischemia.  As review with wife,  He is not a transplant candidate,  I do not think a liver biopsy would alter manangement.  I see mention of steroids, which is fine from my viewpoint . Will followup       Electronically signed by Wallace Benson MD, 02/17/21, 11:32 AM EST.

## 2021-02-17 NOTE — PROGRESS NOTES
"Dustin Villeda  1940 80 y.o.  [id number]      Patient Care Team:  Mal Barboza MD as PCP - General (Internal Medicine)    CC: YASMINE simmons, long QT    Interval History: He is awake mildly confused doing poorly      Objective   Vital Signs  Temp:  [95.9 °F (35.5 °C)-98.6 °F (37 °C)] 97.3 °F (36.3 °C)  Heart Rate:  [44-53] 46  Resp:  [18-24] 23  BP: ()/(37-98) 94/74    Intake/Output Summary (Last 24 hours) at 2/17/2021 0959  Last data filed at 2/17/2021 0900  Gross per 24 hour   Intake 5947.2 ml   Output 1903 ml   Net 4044.2 ml     Flowsheet Rows      First Filed Value   Admission Height  172.7 cm (68\") Documented at 02/14/2021 1318   Admission Weight  72.6 kg (160 lb) Documented at 02/14/2021 1318          Physical Exam:   General Appearance:   Awake, in no acute distress   Lungs:     Clear to auscultation,BS are equal    Heart:    Normal S1 and S2, RRR without murmur, gallop or rub   HEENT:    Sclerae are clear, no JVD or adenopathy   Abdomen:     Normal bowel sounds, soft nontender, nondistended, no HSM   Extremities:  Diffusely mottled his extremities are actually warm I do not feel great pulses but no pains in his legs he has mild throughout his entire body     Medication Review:      budesonide-formoterol, 2 puff, Inhalation, BID - RT  carbidopa-levodopa, 1 tablet, Oral, TID  cefTRIAXone, 1 g, Intravenous, Q24H  cholecalciferol, 1,000 Units, Oral, Daily  docusate sodium, 100 mg, Oral, BID  hydrocortisone sodium succinate, 100 mg, Intravenous, Q8H  metroNIDAZOLE, 500 mg, Intravenous, Q8H  sodium chloride, 3 mL, Intravenous, Q12H      Phoxillum 4 K/2.5 CA, 1,000 mL/hr, Last Rate: 1,000 mL/hr (02/17/21 0544)  Phoxillum 4 K/2.5 CA, 1,000 mL/hr, Last Rate: 1,000 mL/hr (02/17/21 0544)  Phoxillum 4 K/2.5 CA, 1,000 mL/hr, Last Rate: 1,000 mL/hr (02/17/21 0544)  sodium bicarbonate, 150 mEq, Last Rate: 150 mEq (02/17/21 0624)          I reviewed the patient's new clinical results.  I personally viewed and " interpreted the patient's EKG/Telemetry data    Assessment/Plan  Active Hospital Problems    Diagnosis  POA   • **Elevated troponin [R77.8]  Yes   • Generalized weakness [R53.1]  Yes   • Ataxia [R27.0]  Yes   • Volume depletion [E86.9]  Yes   • Bradycardia [R00.1]  Yes   • Cardiomegaly [I51.7]  Yes   • Type 2 diabetes mellitus (CMS/HCC) [E11.9]  Yes   • Hypertension [I10]  Yes   • Hyperlipidemia [E78.5]  Yes   • Gout [M10.9]  Yes   • Parkinson disease (CMS/HCC) [G20]  Yes   • Anxiety [F41.9]  Yes   • Atrial fibrillation (CMS/HCC) [I48.91]  Yes   • Acute kidney injury superimposed on chronic kidney disease (CMS/HCC) [N17.9, N18.9]  Yes   • 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) [I25.119]  Yes   • Anemia [D64.9]  Yes      Resolved Hospital Problems   No resolved problems to display.       I think his cardiac issues are actually very much secondary with what is going on now.  He has had a history of A. fib but is now in sinus and has been relatively bradycardic.  Yesterday his QT level was very long but that was before he had acute dialysis his QT is normalized today.    There is some kind of systemic life-threatening problem going on right now he has marked rise of his anticoagulation status, acute renal failure, acute liver failure, diffuse mottling, elevated MCV I would suspect either some form of vasculitis or cryoglobulinemia.  I strongly agree with getting the hematologist involved.  I have spoken with nephrology.  His prognosis is guarded at best and this is a life-threatening situation unfortunately for this gentleman    Marshall García MD  02/17/21  09:59 EST

## 2021-02-17 NOTE — NURSING NOTE
Team D imaging completed. Dr. Carney reviewed chart and imaging. No evidence of stroke seen. Will not proceed with further neuro intervention. Because Team D imaging took priority we were unable to do CTA of abdomen for fear of further injuring his kidneys. Dr. Rdz was called about this issue. Orders to change abdomen CT to non-contrast. CT abdomen completed and pt returned to room. Will start pt on CRRT.

## 2021-02-17 NOTE — PROGRESS NOTES
Called to bedside for unresponsiveness, agnonal respirations, and bradycardia.      Discussed with wife at bedside  She requested comfort measures only at this point    CC 15 mins.

## 2021-02-17 NOTE — PROGRESS NOTES
"   LOS: 3 days    Patient Care Team:  Mal Barboza MD as PCP - General (Internal Medicine)    Chief Complaint:    Chief Complaint   Patient presents with   • Weakness - Generalized     Dr. Medina sent patinet in for c/o generalized weakness and lethargy ongoing for 3 weeks but worsened today.     Follow UP AARON  Subjective     Interval History:     Eventful night.  Hemodialysis initially.  Developed confusion, dysarthria.  CTA head and neck without significant abnormality.  Due to contrast load, CT of abdomen and pelvis done without contrast.  Other than cholelithiasis, inguinal hernias, pleural effusion, no concerning findings .  Started on CRRT.  Tolerating 25 cc per hour net loss.  Acral cyanosis unchanged, but now with more livedo of abdominal wall, scalp.  Hypothermic on warming blanket.  He is dysarthric this am, but oriented to place and date, month.  Having some visual hallucinations.  Recognized me.   SOA unchanged. Denies pain. No bm.   Review of Systems:   As noted above    Objective     Vital Signs  Temp:  [95.9 °F (35.5 °C)-98.6 °F (37 °C)] 97.3 °F (36.3 °C)  Heart Rate:  [44-53] 51  Resp:  [18-24] 23  BP: ()/(37-98) 106/68    Flowsheet Rows      First Filed Value   Admission Height  172.7 cm (68\") Documented at 2021 1318   Admission Weight  72.6 kg (160 lb) Documented at 2021 1318          No intake/output data recorded.  I/O last 3 completed shifts:  In: 7137.2 [P.O.:240; I.V.:6347.2; IV Piggyback:550]  Out: 1633 [Urine:35; Other:1598]    Intake/Output Summary (Last 24 hours) at 2021 0802  Last data filed at 2021 0700  Gross per 24 hour   Intake 6897.2 ml   Output 1633 ml   Net 5264.2 ml       Physical Exam:  General Appearance: fatigued. O CRRT.  Mild SOA with conversation.  Speech dysarthric, mouth very dry.  Recognized me.  Having some visual hallucinations, thinks there is a computer about to fall from ceiling.  Knows month and Valentines day ,  Knows  and wife's " name .   Skin: mottled. See below .  HEENT: pupils round and reactive to light,  nonicteric sclera  Neck: supple, no JVD, trachea midline  Lungs: Clear to auscultation, no wheezing.   Heart: RRR, normal S1 and S2, no S3, no rub  Abdomen: Abdominal wall mottling to just below ribs. soft, nontender, + bs, slightly distended.   : no palpable bladder  Extremities: no edema, fingers and toes cyanotic.  Toes and feet arm under Jordan hugger .  Knees and thighs mottled.    Neuro:moves all 4 ext.  Follows commands.       Results Review:    Results from last 7 days   Lab Units 02/17/21  0510 02/17/21  0032 02/16/21  1715 02/16/21  1343  02/14/21  1428   SODIUM mmol/L 135*  135* 131* 139 138  138   < > 139   POTASSIUM mmol/L 4.6  4.6 4.3 6.0* 6.7*  6.7*   < > 4.3   CHLORIDE mmol/L 94*  94* 89* 96* 98  98   < > 100   CO2 mmol/L 18.8*  18.8* 19.2* 10.7* 7.7*  7.7*   < > 22.8   BUN mg/dL 26*  26* 36* 63* 62*  62*   < > 31*   CREATININE mg/dL 2.46*  2.46* 3.55* 5.42* 5.53*  5.53*   < > 2.44*   CALCIUM mg/dL 7.4*  7.4* 7.3* 8.4* 8.6  8.6   < > 9.6   BILIRUBIN mg/dL 1.5*  --   --  1.4*  --  1.1   ALK PHOS U/L 170*  --   --  174*  --  207*   ALT (SGPT) U/L 217*  --   --  42*  --  <5   AST (SGOT) U/L 1,185*  --   --  368*  --  34   GLUCOSE mg/dL 139*  139* 215* 296* 236*  236*   < > 147*    < > = values in this interval not displayed.       Estimated Creatinine Clearance: 26 mL/min (A) (by C-G formula based on SCr of 2.46 mg/dL (H)).    Results from last 7 days   Lab Units 02/17/21  0510 02/17/21  0032 02/16/21  1715  02/16/21  0402   MAGNESIUM mg/dL 2.0 1.5*  --   --  2.1   PHOSPHORUS mg/dL 5.1* 5.2* 9.1*   < >  --     < > = values in this interval not displayed.       Results from last 7 days   Lab Units 02/15/21  0545   URIC ACID mg/dL 6.6       Results from last 7 days   Lab Units 02/17/21  0510 02/16/21  0402 02/15/21  0545 02/14/21  1428   WBC 10*3/mm3 6.75 11.17* 7.58 8.90   HEMOGLOBIN g/dL 10.9* 12.0* 10.6*  11.6*   PLATELETS 10*3/mm3 115* 199 206 248       Results from last 7 days   Lab Units 02/17/21  0510 02/16/21  1902   INR  >10.00* >10.00*         Imaging Results (Last 24 Hours)     Procedure Component Value Units Date/Time    CT Angiogram Head [[id number]] Collected: 02/17/21 0018     Updated: 02/17/21 0026    Addenda:        ADDENDUM:  02 17 21 00:25 Call Doctor Regarding Stroke, called   Dr. DWYER on 02 17   00:24 (-05:00)      Signed: 02/17/21 0018 by Darian Garner Jr., MD    Narrative:      CR  Patient: LG AREVALO  Time Out: 00:18  Exam(s): CTA HEAD, CTA HEAD     EXAM:    CT Angiography Head With Intravenous Contrast    CLINICAL HISTORY:     Reason for exam: new onset confusion + aphagia and dysarthria..    TECHNIQUE:    Axial computed tomographic angiography images of the head with   intravenous contrast.  CTDI is 50.70 mGy and DLP is 1041.70 mGy-cm.  This   CT exam was performed according to the principle of ALARA (As Low As   Reasonably Achievable) by using one or more of the following dose   reduction techniques: automated exposure control, adjustment of the mA   and or kV according to patient size, and or use of iterative   reconstruction technique.    MIP reconstructed images were created and reviewed.    COMPARISON:    No relevant prior studies available.    FINDINGS:    Right internal carotid artery:  No acute findings.  Intracranial   segment is patent with no significant stenosis.  No aneurysm.    Right anterior cerebral artery:  Unremarkable.  No occlusion or   significant stenosis.  No aneurysm.    Right middle cerebral artery:  Unremarkable.  No occlusion or   significant stenosis.  No aneurysm.    Right posterior cerebral artery:  Unremarkable.  No occlusion or   significant stenosis.  No aneurysm.    Right vertebral artery:  Unremarkable as visualized.      Left internal carotid artery:  No acute findings.  Intracranial segment   is patent with no significant stenosis.  No aneurysm.    Left  anterior cerebral artery:  Unremarkable.  No occlusion or   significant stenosis.  No aneurysm.    Left middle cerebral artery:  Unremarkable.  No occlusion or   significant stenosis.  No aneurysm.    Left posterior cerebral artery:  Unremarkable.  No occlusion or   significant stenosis.  No aneurysm.    Left vertebral artery:  Unremarkable as visualized.      Basilar artery:  Unremarkable.  No occlusion or significant stenosis.    No aneurysm.    IMPRESSION:       Negative head CTA.  No severe stenosis or major intracranial branch   occlusion.      Impression:            Communications:     Call Doctor Stroke    Electronically signed by Darian Garner MD on 02-17-21 at 0018    CT CEREBRAL PERFUSION W WO CONTRAST W AI ANALYSIS OF LVO [[id number]] Collected: 02/17/21 0016     Updated: 02/17/21 0025    Addenda:        ADDENDUM:  02 17 21 00:25 Call Doctor Regarding Stroke, called   Dr. DWYER on 02 17   00:24 (-05:00)      Signed: 02/17/21 0015 by Darian Garner Jr., MD    Narrative:      CR  Patient: LG AREVALO  Time Out: 00:15  Exam(s): CT PERFUSION     EXAM:    CT Brain Perfusion Without Intravenous Contrast    CLINICAL HISTORY:     Reason for exam: Neuro deficit, acute, stroke suspected.    TECHNIQUE:    Axial computed tomography images of the brain without intravenous   contrast using cerebral perfusion protocol.  Post-processing parametric   maps were created and reviewed.  These include cerebral blood flow,   cerebral blood volume and mean transit time.  CTDI is 110.5 mGy and DLP   is 441.9 mGy-cm.  This CT exam was performed according to the principle   of ALARA (As Low As Reasonably Achievable) by using one or more of the   following dose reduction techniques: automated exposure control,   adjustment of the mA and or kV according to patient size, and or use of   iterative reconstruction technique.    COMPARISON:    No relevant prior studies available.    FINDINGS:    Cerebral blood flow:  Unremarkable.   Symmetric with no defects.    CBF<30% volume=0cc.     Cerebral blood volume:  Unremarkable.  Symmetric.    Mean transit time:  Unremarkable.  No delayed perfusion.  Tmax>6.0 sec   volume= 0cc.    IMPRESSION:       Negative brain perfusion CT.      Impression:            Communications:     Call Doctor Stroke    Electronically signed by Darian Garner MD on 02-17-21 at 0015    CT Angiogram Carotids [[id number]] Collected: 02/17/21 0020     Updated: 02/17/21 0025    Addenda:        ADDENDUM:  02 17 21 00:25 Call Doctor Regarding Stroke, called  Dr. DWYER on 02 17 00:  24 (-05:00)      Signed: 02/17/21 0019 by Darian Garner Jr., MD    Narrative:      CR  Patient: LG AREVALO  Time Out: 00:19  Exam(s): CTA CAROTIDS     EXAM:    CT Angiography Neck With Intravenous Contrast    CLINICAL HISTORY:     Reason for exam: team D, aphasia.    TECHNIQUE:    Axial computed tomographic angiography images of the neck with   intravenous contrast.  CTDI is 27.60 mGy and DLP is 1103.80 mGy-cm.  This   CT exam was performed according to the principle of ALARA (As Low As   Reasonably Achievable) by using one or more of the following dose   reduction techniques: automated exposure control, adjustment of the mA   and or kV according to patient size, and or use of iterative   reconstruction technique.    MIP reconstructed images were created and reviewed.    COMPARISON:    No relevant prior studies available.    FINDINGS:  Limited by some motion artifact.   VASCULATURE:    Right common carotid artery:  Unremarkable.  No significant stenosis.    No dissection or occlusion.    Right internal carotid artery:  Unremarkable.  Extracranial segment is   patent with no significant stenosis.  No dissection or occlusion.    Right external carotid artery:  Unremarkable.  No occlusion.    Right vertebral artery:  Unremarkable.  No significant stenosis.  No   dissection or occlusion.      Left common carotid artery:  Unremarkable.  No significant  stenosis.    No dissection or occlusion.    Left internal carotid artery: Calcified plaques appear to show at least   50% stenosis of the proximal left internal carotid artery, though there   is some motion artifact in the region, limiting evaluation.    Left external carotid artery:  Unremarkable.  No occlusion.    Left vertebral artery:  Unremarkable.  No significant stenosis.  No   dissection or occlusion.     NECK:    Bones joints:  No acute fracture.  No dislocation.    Soft tissues:  Unremarkable as visualized.  No mass.     CAROTID STENOSIS REFERENCE USING NASCET CRITERIA:    % ICA stenosis = (1 - narrowest ICA diameter diameter of distal   cervical ICA) x 100.    Mild - <50% stenosis.    Moderate - 50-69% stenosis.    Severe - 70-94% stenosis.    Near occlusion - 95-99% stenosis.    Occluded - 100% stenosis.    IMPRESSION:         Calcified plaques appear to show at least 50% stenosis of the proximal   left internal carotid       Impression:            Communications:     Call Doctor Stroke    Electronically signed by Darian Garner MD on 02-17-21 at 0019    CT Abdomen Pelvis Without Contrast [[id number]] Collected: 02/17/21 0025     Updated: 02/17/21 0025    Narrative:        Patient: LG AREVALO  Time Out: 00:24  Exam(s): CT ABDOMEN + PELVIS Without Contrast     EXAM:    CT Abdomen and Pelvis Without Intravenous Contrast    CLINICAL HISTORY:     Reason for exam: Renal artery stenosis. lactic acidosis, renal failure.    TECHNIQUE:    Axial computed tomography images of the abdomen and pelvis without   intravenous contrast.  CTDI is 23.70 mGy and DLP is 1437.20 mGy-cm.  This   CT exam was performed according to the principle of ALARA (As Low As   Reasonably Achievable) by using one or more of the following dose   reduction techniques: automated exposure control, adjustment of the mA   and or kV according to patient size, and or use of iterative   reconstruction technique.    COMPARISON:    No relevant prior  studies available.    FINDINGS:    Lung bases:  Calcified right basilar granuloma.  11 mm partially   calcified left basilar nodule which may be a granuloma or hamartoma.    Pleural space:  Small right pleural effusion.  Mild underlying right   basilar consolidation which may be compressive atelectasis.    Heart:  Mild cardiomegaly with calcified mitral annulus.     ABDOMEN:    Liver:  Numerous scattered calcified hepatic granulomas.    Gallbladder and bile ducts:  Multiple calcified gallstones.  No ductal   dilation.    Pancreas:  Unremarkable.  No ductal dilation.    Spleen:  Numerous scattered calcified splenic granulomas.    Adrenals:  Unremarkable.  No mass.    Kidneys and ureters:  Several bilateral nonobstructing renal calculi   with the largest measuring 8 mm on the right.  Kidneys are mildly   atrophic with mild bilateral perinephric stranding.  No ureteral calculus   or hydronephrosis.    Stomach and bowel:  Unremarkable.  No obstruction.  No mucosal   thickening.  No significant bowel wall thickening or pneumatosis.     PELVIS:    Appendix:  Unremarkable appendix.    Bladder:  Bladder decompressed by a Hu catheter.  No stones.    Reproductive:  Mildly enlarged prostate gland.  Moderate right scrotal   hydrocele.     ABDOMEN and PELVIS:    Intraperitoneal space:  Trace pelvic ascites.  No loculated fluid   collection.  Presacral edema fluid.  No free air.    Bones joints:  Multilevel degenerative changes of the thoracolumbar   spine.  Diffuse sclerosis of the L5 vertebral body with patchy central   lucencies.  No acute fracture.  No dislocation.    Soft tissues:  Bilateral fat-containing inguinal hernias with mild   fluid.  Mild body wall edema.    Vasculature:  Calcified abdominal aorta.  No aneurysm.  Bilateral renal   artery appear patent though not optimally evaluated on this exam.  The   celiac and superior mesenteric arteries also appear patent but not   optimally evaluated on this exam.    Lymph  nodes:  Calcified portacaval lymph nodes.    IMPRESSION:       1.  Cholelithiasis.  2.  Bilateral nonobstructing renal calculi.  No hydronephrosis.    Bilateral renal arteries appear patent though not optimally evaluated on   this exam.  Mild bilateral renal atrophy.  3.  Small right pleural effusion with mild underlying basilar   consolidation which may be compressive atelectasis.  4.  Evidence of remote granulomatous infection.  5.  Trace pelvic ascites.  6.  Nonspecific presacral edema fluid.  7.  Bilateral fat-containing inguinal hernias with mild fluid.  Moderate   right scrotal hydrocele.  8.  Mild anasarca.  9.  Diffuse sclerosis of the L5 vertebral body.  Possible considerations   include osteoblastic metastasis or Paget's disease.  10.  Additional findings, as described above      Impression:          Electronically signed by Kenny Davila M.D. on 02-17-21 at 0024        allopurinol, 100 mg, Oral, Daily  budesonide-formoterol, 2 puff, Inhalation, BID - RT  carbidopa-levodopa, 1 tablet, Oral, TID  cefTRIAXone, 1 g, Intravenous, Q24H  cholecalciferol, 1,000 Units, Oral, Daily  docusate sodium, 100 mg, Oral, BID  ferrous sulfate, 325 mg, Oral, Daily With Breakfast  hydrocortisone sodium succinate, 100 mg, Intravenous, Q8H  metroNIDAZOLE, 500 mg, Intravenous, Q8H  sodium chloride, 3 mL, Intravenous, Q12H      Phoxillum 4 K/2.5 CA, 1,000 mL/hr, Last Rate: 1,000 mL/hr (02/17/21 0544)  Phoxillum 4 K/2.5 CA, 1,000 mL/hr, Last Rate: 1,000 mL/hr (02/17/21 0544)  Phoxillum 4 K/2.5 CA, 1,000 mL/hr, Last Rate: 1,000 mL/hr (02/17/21 0544)  sodium bicarbonate, 150 mEq, Last Rate: 150 mEq (02/17/21 0624)        Medication Review:   Current Facility-Administered Medications   Medication Dose Route Frequency Provider Last Rate Last Admin   • acetaminophen (TYLENOL) tablet 650 mg  650 mg Oral Q4H PRN Marshall Medina MD        Or   • acetaminophen (TYLENOL) 160 MG/5ML solution 650 mg  650 mg Oral Q4H PRN Marshall Medina MD         Or   • acetaminophen (TYLENOL) suppository 650 mg  650 mg Rectal Q4H PRN Marshall Medina MD       • albumin human 25 % IV SOLN 12.5 g  12.5 g Intravenous PRN Tia Tanner MD       • allopurinol (ZYLOPRIM) tablet 100 mg  100 mg Oral Daily Marshall Medina MD   100 mg at 02/16/21 0942   • budesonide-formoterol (SYMBICORT) 160-4.5 MCG/ACT inhaler 2 puff  2 puff Inhalation BID - RT Marshall Medina MD   2 puff at 02/16/21 0735   • calcium gluconate 1 g in sodium chloride 0.9 % 50 mL IVPB  1 g Intravenous PRN Tia Tanner MD        Or   • calcium gluconate 2 g in sodium chloride 0.9 % 50 mL IVPB  2 g Intravenous PRN Tia Tanner MD       • carbidopa-levodopa (SINEMET)  MG per tablet 1 tablet  1 tablet Oral TID Marshall Medina MD   1 tablet at 02/16/21 2032   • cefTRIAXone (ROCEPHIN) IVPB 1 g  1 g Intravenous Q24H Gabriel Newberry  mL/hr at 02/16/21 1805 1 g at 02/16/21 1805   • cholecalciferol (VITAMIN D3) tablet 1,000 Units  1,000 Units Oral Daily Marshall Medina MD   1,000 Units at 02/16/21 0942   • dextrose (D50W) 25 g/ 50mL Intravenous Solution 50 mL  50 mL Intravenous Q1H PRN Mal Barboza MD   50 mL at 02/16/21 1328   • docusate sodium (COLACE) capsule 100 mg  100 mg Oral BID Marshall Medina MD   100 mg at 02/16/21 2032   • ferrous sulfate tablet 325 mg  325 mg Oral Daily With Breakfast Marshall Medina MD   325 mg at 02/16/21 0942   • heparin (porcine) injection 1,000-2,000 Units  1,000-2,000 Units Intravenous PRN Tia Tanner MD       • hydrocortisone sodium succinate (Solu-CORTEF) injection 100 mg  100 mg Intravenous Q8H Marcelo Rdz MD       • magnesium sulfate 2g/50 mL (PREMIX) infusion  2 g Intravenous PRN Tia Tanner MD   2 g at 02/17/21 0353   • melatonin tablet 5 mg  5 mg Oral Nightly PRN Marshall Medina MD       • metroNIDAZOLE (FLAGYL) 500 mg/100mL IVPB  500 mg Intravenous Q8H Gabriel Newberry MD   500 mg at 02/17/21 0122   •  nitroglycerin (NITROSTAT) SL tablet 0.4 mg  0.4 mg Sublingual Q5 Min PRN Marshall Medina MD       • ondansetron (ZOFRAN) injection 4 mg  4 mg Intravenous Q6H PRN Marshall Medina MD       • Phoxillum 4 K/2.5 CA dialysis solution  1,000 mL/hr CRRT Continuous Tia Tanner MD 1,000 mL/hr at 02/17/21 0544 1,000 mL/hr at 02/17/21 0544   • Phoxillum 4 K/2.5 CA dialysis solution  1,000 mL/hr CRRT Continuous Tia Tanner MD 1,000 mL/hr at 02/17/21 0544 1,000 mL/hr at 02/17/21 0544   • Phoxillum 4 K/2.5 CA dialysis solution  1,000 mL/hr CRRT Continuous Tia Tanner MD 1,000 mL/hr at 02/17/21 0544 1,000 mL/hr at 02/17/21 0544   • potassium chloride 20 mEq in 50 mL IVPB  20 mEq Intravenous PRN Tia Tanner MD       • sodium bicarbonate 150 mEq/1000 mL dextrose infusion  150 mEq Intravenous Continuous Tia Tanner  mL/hr at 02/17/21 0624 150 mEq at 02/17/21 0624   • sodium chloride 0.9 % bolus 200 mL  200 mL Intravenous PRN Tia Tanner MD       • sodium chloride 0.9 % flush 10 mL  10 mL Intravenous PRN Marshall Medina MD       • sodium chloride 0.9 % flush 3 mL  3 mL Intravenous Q12H Marshall Medina MD   3 mL at 02/16/21 2028   • sodium chloride 0.9 % flush 3-10 mL  3-10 mL Intravenous PRN Marshall Medina MD       • sodium phosphates 10 mmol in sodium chloride 0.9 % 100 mL IVPB  10 mmol Intravenous PRN Tia Tanner MD           Assessment/Plan   1. AARON with rapid rate of rise of waste products, hyperkalemia and anion gap metabolic acidemia.   UA with protein, but few RBC so not really active to suggest GN.  Complements normal.   Poor perfusion as evidenced by acral  cyanosis  with bradycardia, but preserved bp.  SP HD last night, now on CRRT to control acidemia and K.    Waste products, potassium, and acidemia better after dialysis and now on CRRT.   Pending : cryoglobulins, ANCA.  2. Afib, Bradycardia.  Echo 2/15 with normal LV function and diastolic  function. Valves a little thickened, but Dr. Nino reviewed and does not think vegetations present.    3. Acral cyanosis, now with increasing mottling, livedo. ? Small vessel Vasculitis, cryoglobulinemia.  INR over 10 ( as it was last admission),  PTT 62 without heparin, Platelets starting to fall, fibrinogen normal, FSP 5-20. I have ordered anticardiolipin antibodies for possible anti-phospholipid syndrome.  Ask hematology to see.  On hydrocortisone.   4.  Anemia with recent epistaxis due to supra-therapeutic INR.  5. CAD  6. Altered mental status. CTA unrevealing.  Likely more metabolic, toxic than structural.  DW Dr. Carney.   7. Hypoglycemia.  Actos dc yesterday.  Glucose 107-215 currently on bicarb drip in D5.   8. Hypothermia with lactic acidemia.  On empiric ceftriaxone .  9. PAF.   10. Elevated transaminases.     Plan:  1. Dw. Dr. Carney and Dr. Barboza.  2. Hematology consult  3. Anticardiolipin antibody/lupus anticoag pending .  4. Continue CRRT today.  5. I spoke to lab regarding time frame on send outs  Cryoglobulin ( 4-5 days, possible prelim 2 days),  ANCA should be resulted tomorrow .  6. Critically ill and prognosis appears poor.  Continue full support .    Tia Tanner MD  02/17/21  08:02 EST

## 2021-02-17 NOTE — DISCHARGE PLACEMENT REQUEST
"Dustin Arevalo (80 y.o. Male)     Date of Birth Social Security Number Address Home Phone MRN    [id number]  [id number]  [facility] [address] [phone] [id number]    Hinduism Marital Status          Non-Baptism        Admission Date Admission Type Admitting Provider Attending Provider Department, Room/Bed    2/14/21 Emergency Mal Barboza MD Rueff, Lawrence J, MD [facility] INTENSIVE CARE, I382/1    Discharge Date Discharge Disposition Discharge Destination                       Attending Provider: Mal Barboza MD    Allergies: No Known Allergies    Isolation: None   Infection: None   Code Status: CPR    Ht: 172.7 cm (68\")   Wt: 74.4 kg (164 lb 0.4 oz)    Admission Cmt: None   Principal Problem: Elevated troponin [R77.8]                 Active Insurance as of 2/14/2021     Primary Coverage     Payor Plan Insurance Group Employer/Plan Group    MEDICARE MEDICARE A & B      Payor Plan Address Payor Plan Phone Number Payor Plan Fax Number Effective Dates    [address] [phone]  10/1/2007 - None Entered    [facility] [id number]       Subscriber Name Subscriber Birth Date Member ID       DUSTIN AREVALO 1940 [id number]           Secondary Coverage     Payor Plan Insurance Group Employer/Plan Group    [facility] SUPP [id number]     Payor Plan Address Payor Plan Phone Number Payor Plan Fax Number Effective Dates    [address]   12/1/2016 - None Entered    [facility] [id number]       Subscriber Name Subscriber Birth Date Member ID       DUSTIN AREVALO 1940 [id number]                 Emergency Contacts      (Rel.) Home Phone Work Phone Mobile Phone    Angi Arevalo (Spouse) [phone] -- --              "

## 2021-02-17 NOTE — CONSULTS
.     REASON FOR CONSULTATION:     Provide an opinion on any further workup or treatment                             REQUESTING PHYSICIAN: Tia Tanner MD     RECORDS OBTAINED:  Records of the patient's history including those obtained from the referring provider were reviewed and summarized in detail.    HISTORY OF PRESENT ILLNESS:  The patient is a 80 y.o. year old male who is admitted at this time to [facility] because of generalized weakness for about 3 weeks.  He was admitted on 02/14/2021 this time.  This patient actually recently was discharged from this hospital on 02/04/2021 because of epistaxis secondary to supratherapeutic INR above 10, and anemia thought secondary to epistaxis.  The patient was on Coumadin for his atrial fibrillation, however because of the recent incident, Coumadin was discontinued and he was put on Eliquis 5 mg twice a day during the hospitalization and later at the time of discharge.  The patient also has other history with chronic renal insufficiency, Parkinson disease, type 2 diabetes mellitus, hypertension, etc.  During this hospitalization, patient had multiple medical issues, he had significant liver injury, and also with worsening renal function, increased waste product including hyperkalemia, and has been on CRRT and he has worsening INR again, above 10, PTT 62 seconds without even on Coumadin.      He also developed acrocyanosis with increased mottling of the skin, suspected for possible small vessel vasculitis, cryoglobulinemia and Dr. Tanner already requested laboratory study for anticardiolipin antibody and lupus anticoagulant, and also cryoglobulin. Laboratory study today reported an AST of 1185, , alkaline phosphatase 170 and total bilirubin 1.5.  His renal function with creatinine 2.46, potassium 4.6, glucose 139, and sodium 135.  His total serum protein is 5.6 and albumin 2.8, globulin 2.8.  Lactate of 10.2. Procalcitonin 0.53.  Patient also  had improved FDP 5-20 mcg/mL.  He had an INR above 10 and PT above 100 seconds, PTT 61.9 seconds, normal fibrinogen 348 and hemoglobin 10.9, .0, MCHC 32.4, platelets 115,000 and WBC 6750, ANC 6000, lymphocytes 200, monocytes 540.      His altered mental status was also thought related to metabolic encephalopathy.  CT scan of the head was unrevealing.          Past Medical History:   Diagnosis Date   • Ankle fracture     RIGHT ANKLE    • Anxiety     ABOUT CURRENT HEALTH SITUATION    • Atrial fibrillation (CMS/HCC)    • Constipation due to opioid therapy     NO BOWEL MOVEMENT IN 5 DAYS   • Coronary artery disease    • Diabetes mellitus (CMS/HCC)    • Gout     BIG TOES   • Hyperlipidemia    • Hypertension    • Iron deficiency anemia    • Parkinson disease (CMS/HCC)    • Supratherapeutic INR      Past Surgical History:   Procedure Laterality Date   • ANKLE OPEN REDUCTION INTERNAL FIXATION Right 11/12/2019    Procedure: OPEN REDUCTION INTERNAL FIXATION RIGHT ANKLE;  Surgeon: Evgeny Keith II, MD;  Location: [facility] OR;  Service: Orthopedics   • CATARACT EXTRACTION EXTRACAPSULAR W/ INTRAOCULAR LENS IMPLANTATION Bilateral    • COLONOSCOPY  2011   • CORONARY ANGIOPLASTY WITH STENT PLACEMENT  1998   • ENDOSCOPY  1/31/2021    Procedure: ESOPHAGOGASTRODUODENOSCOPY AT BEDSIDE;  Surgeon: Geoff Rosenthal MD;  Location: [facility] ENDOSCOPY;  Service: Gastroenterology;;  PRE OP - BLACK STOOLS  POST OP - CANDIDA   • EXTRACORPOREAL SHOCK WAVE LITHOTRIPSY (ESWL)      OVER  10 YEARS AGO   • PILONIDAL CYSTECTOMY  1964       MEDICATIONS    Current Facility-Administered Medications:   •  acetaminophen (TYLENOL) tablet 650 mg, 650 mg, Oral, Q4H PRN **OR** acetaminophen (TYLENOL) 160 MG/5ML solution 650 mg, 650 mg, Oral, Q4H PRN **OR** acetaminophen (TYLENOL) suppository 650 mg, 650 mg, Rectal, Q4H PRN, Marshall Meidna MD  •  albumin human 25 % IV SOLN 12.5 g, 12.5 g, Intravenous, PRN, Tia Tanner MD  •   budesonide-formoterol (SYMBICORT) 160-4.5 MCG/ACT inhaler 2 puff, 2 puff, Inhalation, BID - RT, Marshall Medina MD, 2 puff at 02/16/21 0735  •  calcium gluconate 1 g in sodium chloride 0.9 % 50 mL IVPB, 1 g, Intravenous, PRN **OR** calcium gluconate 2 g in sodium chloride 0.9 % 50 mL IVPB, 2 g, Intravenous, PRN, Tia Tanner MD  •  carbidopa-levodopa (SINEMET)  MG per tablet 1 tablet, 1 tablet, Oral, TID, Marshall Medina MD, 1 tablet at 02/16/21 2032  •  cefTRIAXone (ROCEPHIN) IVPB 1 g, 1 g, Intravenous, Q24H, Gabriel Newberry MD, Last Rate: 100 mL/hr at 02/16/21 1805, 1 g at 02/16/21 1805  •  cholecalciferol (VITAMIN D3) tablet 1,000 Units, 1,000 Units, Oral, Daily, Marshall Medina MD, 1,000 Units at 02/16/21 0942  •  dextrose (D50W) 25 g/ 50mL Intravenous Solution 50 mL, 50 mL, Intravenous, Q1H PRN, Mal Barboza MD, 50 mL at 02/16/21 1328  •  docusate sodium (COLACE) capsule 100 mg, 100 mg, Oral, BID, Marshall Medina MD, 100 mg at 02/16/21 2032  •  famotidine (PEPCID) injection 20 mg, 20 mg, Intravenous, Daily, Gabriel Newberry MD, 20 mg at 02/17/21 1236  •  heparin (porcine) injection 1,000-2,000 Units, 1,000-2,000 Units, Intravenous, PRN, Tia Tanner MD  •  magnesium sulfate 2g/50 mL (PREMIX) infusion, 2 g, Intravenous, PRN, Tia Tanner MD, 2 g at 02/17/21 0353  •  melatonin tablet 5 mg, 5 mg, Oral, Nightly PRN, Marshall Medina MD  •  metroNIDAZOLE (FLAGYL) 500 mg/100mL IVPB, 500 mg, Intravenous, Q8H, Gabriel Newberry MD, 500 mg at 02/17/21 1013  •  nitroglycerin (NITROSTAT) SL tablet 0.4 mg, 0.4 mg, Sublingual, Q5 Min PRN, Marshall Medina MD  •  Phoxillum 4 K/2.5 CA dialysis solution, 1,000 mL/hr, CRRT, Continuous, Tia Tanner MD, Last Rate: 1,000 mL/hr at 02/17/21 0544, 1,000 mL/hr at 02/17/21 0544  •  Phoxillum 4 K/2.5 CA dialysis solution, 1,000 mL/hr, CRRT, Continuous, Tia Tanner MD, Last Rate: 1,000 mL/hr at 02/17/21 0544, 1,000 mL/hr at  02/17/21 0544  •  Phoxillum 4 K/2.5 CA dialysis solution, 1,000 mL/hr, CRRT, Continuous, Tia Tanner MD, Last Rate: 1,000 mL/hr at 02/17/21 1054, 1,000 mL/hr at 02/17/21 1054  •  potassium chloride 20 mEq in 50 mL IVPB, 20 mEq, Intravenous, PRNFlores Karen Marie, MD  •  sodium bicarbonate 150 mEq/1000 mL dextrose infusion, 150 mEq, Intravenous, Continuous, Tia Tanner MD, Last Rate: 200 mL/hr at 02/17/21 1147, 150 mEq at 02/17/21 1147  •  sodium chloride 0.9 % bolus 200 mL, 200 mL, Intravenous, PRN, Tia Tanner MD  •  sodium chloride 0.9 % flush 10 mL, 10 mL, Intravenous, PRN, Marshall Medina MD  •  sodium chloride 0.9 % flush 3 mL, 3 mL, Intravenous, Q12H, Marshall Medina MD, 3 mL at 02/17/21 0923  •  sodium chloride 0.9 % flush 3-10 mL, 3-10 mL, Intravenous, PRN, Marshall Medina MD  •  sodium phosphates 10 mmol in sodium chloride 0.9 % 100 mL IVPB, 10 mmol, Intravenous, PRN, Tia Tanner MD    ALLERGIES:   No Known Allergies    SOCIAL HISTORY:       Social History     Socioeconomic History   • Marital status:      Spouse name: Not on file   • Number of children: Not on file   • Years of education: Not on file   • Highest education level: Not on file   Tobacco Use   • Smoking status: Former Smoker   • Smokeless tobacco: Never Used   Substance and Sexual Activity   • Alcohol use: Yes     Alcohol/week: 3.0 standard drinks     Types: 3 Shots of liquor per week     Comment: burbon   • Drug use: No   • Sexual activity: Defer         FAMILY HISTORY:  Family History   Problem Relation Age of Onset   • Malig Hyperthermia Neg Hx        REVIEW OF SYSTEMS:  Review of Systems   Constitutional: Positive for activity change, appetite change and fatigue. Negative for fever.   HENT: Positive for mouth sores (Dry mouth). Negative for nosebleeds and sore throat.    Respiratory: Negative for cough and shortness of breath.    Cardiovascular: Positive for leg swelling. Negative for  chest pain.   Gastrointestinal: Positive for abdominal distention. Negative for anal bleeding, blood in stool and nausea.   Endocrine: Negative for cold intolerance.   Genitourinary: Negative for hematuria.   Musculoskeletal: Negative for joint swelling.   Skin: Positive for color change. Negative for rash.   Allergic/Immunologic: Negative for immunocompromised state.   Neurological: Negative for syncope and headaches.   Hematological: Bruises/bleeds easily.   Psychiatric/Behavioral: Positive for confusion and decreased concentration.              Vitals:    02/17/21 1215 02/17/21 1229 02/17/21 1230 02/17/21 1245   BP: 99/83  101/83 93/75   Pulse: (!) 48 (!) 49  64   Resp:       Temp:       TempSrc:       SpO2: (!) 65% (!) 61% (!) 60%    Weight:       Height:         No flowsheet data found.   PHYSICAL EXAM:      CONSTITUTIONAL:  Vital signs reviewed.  Patient looks very weak lying in bed not in acute distress.  Follows simple commands.  Unable to communicate verbally.  EYES:  Conjunctiva and lids unremarkable.  PERRLA  EARS,NOSE,MOUTH,THROAT: Dry mouth.  Lips appear unremarkable.  RESPIRATORY:  Normal respiratory effort.  Lungs clear to auscultation bilaterally.  CARDIOVASCULAR: Regular rhythm.  Rate controlled.  Normal S1, S2.  No murmurs.   GASTROINTESTINAL: Abdomen slightly distended.  Nontender.  Bowel sounds present.    LYMPHATIC:  No cervical, supraclavicular lymphadenopathy.  MUSCULOSKELETAL:   1+ edema lower extremity edema.  Unremarkable digits/nails.  No cyanosis or clubbing.  SKIN:  Warm.  No rashes.  Positive for bruises.  Acrocyanosis.  Skin mottling.          RECENT LABS:        WBC   Date Value Ref Range Status   02/17/2021 6.75 3.40 - 10.80 10*3/mm3 Final   02/16/2021 11.17 (H) 3.40 - 10.80 10*3/mm3 Final   02/15/2021 7.58 3.40 - 10.80 10*3/mm3 Final   02/14/2021 8.90 3.40 - 10.80 10*3/mm3 Final     Hemoglobin   Date Value Ref Range Status   02/17/2021 10.9 (L) 13.0 - 17.7 g/dL Final   02/16/2021  12.0 (L) 13.0 - 17.7 g/dL Final   02/15/2021 10.6 (L) 13.0 - 17.7 g/dL Final   02/14/2021 11.6 (L) 13.0 - 17.7 g/dL Final     Platelets   Date Value Ref Range Status   02/17/2021 136 (L) 140 - 450 10*3/mm3 Final   02/17/2021 115 (L) 140 - 450 10*3/mm3 Final   02/16/2021 199 140 - 450 10*3/mm3 Final   02/15/2021 206 140 - 450 10*3/mm3 Final   02/14/2021 248 140 - 450 10*3/mm3 Final     Results from last 7 days   Lab Units 02/17/21  0510 02/17/21  0032 02/16/21  1715 02/16/21  1343  02/14/21  1428   SODIUM mmol/L 135*  135* 131* 139 138  138   < > 139   POTASSIUM mmol/L 4.6  4.6 4.3 6.0* 6.7*  6.7*   < > 4.3   CHLORIDE mmol/L 94*  94* 89* 96* 98  98   < > 100   CO2 mmol/L 18.8*  18.8* 19.2* 10.7* 7.7*  7.7*   < > 22.8   BUN mg/dL 26*  26* 36* 63* 62*  62*   < > 31*   CREATININE mg/dL 2.46*  2.46* 3.55* 5.42* 5.53*  5.53*   < > 2.44*   CALCIUM mg/dL 7.4*  7.4* 7.3* 8.4* 8.6  8.6   < > 9.6   BILIRUBIN mg/dL 1.5*  --   --  1.4*  --  1.1   ALK PHOS U/L 170*  --   --  174*  --  207*   ALT (SGPT) U/L 217*  --   --  42*  --  <5   AST (SGOT) U/L 1,185*  --   --  368*  --  34   GLUCOSE mg/dL 139*  139* 215* 296* 236*  236*   < > 147*    < > = values in this interval not displayed.     Results from last 7 days   Lab Units 02/17/21  0510 02/16/21  1902   INR  >10.00* >10.00*   APTT seconds 61.9*  --      Assessment/Plan     ASSESSMENT:   1. Coagulopathy, etiology is not very clear. This patient has liver injury with significantly elevated AST and ALT and also slightly elevated total bilirubin. The coagulopathy could be because of the liver problem. I recommended to obtain mixing study for INR and mixing study of PTT. Will also give the patient 1 dose vitamin K 10 mg IV. Depending on the study results, if he confirms to have factor deficiency, we could give the patient FFP to reverse the elevated PTT and INR. If this patient has inhibitor, then we will expand the study for antiphospholipid antibodies; Dr. Tanner  already ordered antiphospholipid antibodies, but we certainly can use more study in that setting.      2. Mild thrombocytopenia. Will repeat platelets and also check IPF. This patient had normal platelets yesterday at 199,000, and normal prior to that. This patient looks to have DIC picture, with elevated FDP.     3. Anemia. Hemoglobin 10.9. He had recent iron studies on 02/15/2021 with ferritin 205, iron 33, TIBC 350, and iron saturation 9%. This looks like a mixed picture of inflammatory response and iron deficiency. Considering his very complicated problem. We will also check vitamin B12 and folic acid to help with evaluation.     4. Acute renal injury on top of chronic renal insufficiency, currently on CRRT. The patient is being followed by Nephrology service.     5. Acute liver injury, etiology not clear. He was tested negative for hepatitis. I think it is not unreasonable to try steroids, considering this patient might have vasculitis.       Discussed with the patient's wife at the bedside.      Discussed with Dr. Tanner and Dr. Newberry.      CARMEL QUESADA M.D., Ph.D.    2/17/2021

## 2021-02-17 NOTE — NURSING NOTE
Unable to keep consistent oxygen saturation readings with multiple site and probe changes, patient work of breathing  Increasing, advised Dr Newberry at bedside new orders for stat ABG.

## 2021-02-17 NOTE — CONSULTS
"Neurology Consult Note    Consult Date: 2/17/2021    Referring MD: Mal Barboza MD    Reason for Consult I have been asked to see the patient in neurological consultation to render advice and opinion regarding encephalopathy, aphasia    Dustin Villeda is a 80 y.o. male with past medical history of atrial fibrillation on warfarin, diabetes, hypertension.  He recently was admitted for epistaxis in the setting of supratherapeutic INR.  His warfarin was stopped.  He was readmitted yesterday for bradycardia, AARON.  He deteriorated and went to the ICU for worsening AARON, lactic acidosis hypoglycemia.  He was started on dialysis overnight.  I was called after completion for altered renal status.  His nurse reported fluctuating word finding difficulty and generalized confusion.  He was taken for CTA and CT perfusion overnight which were negative.  This morning his mental status is somewhat better although he remains encephalopathic and inattentive.    Past Medical History:   Diagnosis Date   • Ankle fracture     RIGHT ANKLE    • Anxiety     ABOUT CURRENT HEALTH SITUATION    • Atrial fibrillation (CMS/HCC)    • Constipation due to opioid therapy     NO BOWEL MOVEMENT IN 5 DAYS   • Coronary artery disease    • Diabetes mellitus (CMS/HCC)    • Gout     BIG TOES   • Hyperlipidemia    • Hypertension    • Iron deficiency anemia    • Parkinson disease (CMS/HCC)    • Supratherapeutic INR        ROS:  No fevers, chills  + weakness, confusion    Exam  /68   Pulse 51   Temp 97.3 °F (36.3 °C) (Rectal)   Resp 23   Ht 172.7 cm (68\")   Wt 74.4 kg (164 lb 0.4 oz)   SpO2 (!) 88%   BMI 24.94 kg/m²   Gen: NAD, vitals reviewed  MS: oriented x2, recent/remote memory intact, impaired attention/concentration, language intact, no neglect.  CN: visual acuity grossly normal, PERRL, EOMI, no facial droop, severe dysarthria  Motor: 4+/5 throughout, normal tone, + asterixis    DATA:    Lab Results   Component Value Date    GLUCOSE 139 " (H) 02/17/2021    GLUCOSE 139 (H) 02/17/2021    CALCIUM 7.4 (L) 02/17/2021    CALCIUM 7.4 (L) 02/17/2021     (L) 02/17/2021     (L) 02/17/2021    K 4.6 02/17/2021    K 4.6 02/17/2021    CO2 18.8 (L) 02/17/2021    CO2 18.8 (L) 02/17/2021    CL 94 (L) 02/17/2021    CL 94 (L) 02/17/2021    BUN 26 (H) 02/17/2021    BUN 26 (H) 02/17/2021    CREATININE 2.46 (H) 02/17/2021    CREATININE 2.46 (H) 02/17/2021    EGFRIFAFRI  02/16/2021      Comment:      <15 Indicative of kidney failure.    EGFRIFNONA 26 (L) 02/17/2021    EGFRIFNONA 26 (L) 02/17/2021    BCR 10.6 02/17/2021    BCR 10.6 02/17/2021    ANIONGAP 22.2 (H) 02/17/2021    ANIONGAP 22.2 (H) 02/17/2021     Lab Results   Component Value Date    WBC 6.75 02/17/2021    HGB 10.9 (L) 02/17/2021    HCT 33.6 (L) 02/17/2021    .0 (H) 02/17/2021     (L) 02/17/2021       Lab review: GFR 26, Na 125,     Imaging review: I personally reviewed the CTA head and neck and CT perfusion performed last night which showed no acute abnormalities.    Diagnoses:  Acute encephalopathy  Acute kidney injury  Bradycardia  supratherapeutic INR  Paroxysmal atrial fibrillation    Comment: Suspect fluctuating encephalopathy related to his overall medical condition. No clinical signs of stroke, CTA/CTP negative. MRI brain without contrast is reasonable given possibility of systemic vasculitis    PLAN:  MRI brain without contrast when stable

## 2021-02-17 NOTE — PROGRESS NOTES
Discharge Planning Assessment  [facility]     Patient Name: Dustin Villeda  MRN: [id number]  Today's Date: 2/17/2021    Admit Date: 2/14/2021    Discharge Needs Assessment     Row Name 02/17/21 1527       Living Environment    Lives With  spouse    Current Living Arrangements  home/apartment/condo    Potentially Unsafe Housing Conditions  unable to assess    Primary Care Provided by  self;spouse/significant other    Provides Primary Care For  no one    Family Caregiver if Needed  spouse    Quality of Family Relationships  supportive    Able to Return to Prior Arrangements  yes       Resource/Environmental Concerns    Resource/Environmental Concerns  none       Transition Planning    Patient/Family Anticipates Transition to  home with family    Patient/Family Anticipated Services at Transition  none    Transportation Anticipated  family or friend will provide       Discharge Needs Assessment    Equipment Currently Used at Home  cane, straight;shower chair;glucometer;grab bar    Concerns to be Addressed  discharge planning        Discharge Plan     Row Name 02/17/21 1530       Plan    Plan  Pt is current with [facility]  they will follow  DC plans undetermined    Plan Comments  IMM noted.   CCP spoke with patient’s wife Cecy, [phone] at bedside..  CCP role explained and discharge planning discussed. Face sheet verified. Pt PCP is Dr. Mal Barboza.  Pt. emergency contact is his wife.    Pt lives in a two story house with his wife.  He uses a cane to ambulate.  He is independent with ADL’s.   Pt obtains his medications from [facility] in [place].    Patient has a current history of Home Health with [facility]   Plan for discharge is undetermined  Humaira@ [facility] will follow for HH  .  CCP following for discharge needs as the patient's clinical course evolves.        Continued Care and Services - Admitted Since 2/14/2021     Home Medical Care     Service Provider Request Status Selected Services Address Phone Fax Patient  Preferred    [facility] [place]  Accepted N/A [address] [facility] [id number] [phone] --            Selected Continued Care - Prior Encounters Includes selections from prior encounters from 11/16/2020 to 2/17/2021    Discharged on 2/4/2021 Admission date: 1/30/2021 - Discharge disposition: Home or Self Care    Home Medical Care     Service Provider Selected Services Address Phone Fax Patient Preferred    [facility] [address] [id number] [phone] --                      Demographic Summary    No documentation.       Functional Status    No documentation.       Psychosocial    No documentation.       Abuse/Neglect    No documentation.       Legal    No documentation.       Substance Abuse    No documentation.       Patient Forms    No documentation.           Ramila Heath RN

## 2021-02-17 NOTE — NURSING NOTE
Patient unresponsive, heart rate dropped to 30's agonal breaths,  called Dr Newberry to bedside, 1536 1 amp of epi given 1537 cpr started 1538 cpr stopped at wife's request at bedside. To make comfortable

## 2021-02-17 NOTE — NURSING NOTE
Patient unresponsive, absent breath sounds, and heart tones, absent rise and fall of chest, confirmed by 2 RN Rita Hahn RN and Cecilia Castillo RN. Pupils fixed and non reactive, 0 pulse, 0 RR, and 0 blood pressure noted.

## 2021-02-17 NOTE — PROGRESS NOTES
LOS: 3 days   Patient Care Team:  Mal Barboza MD as PCP - General  Mal Barboza MD as PCP - Family Medicine    Chief Complaint:   Chief Complaint   Patient presents with   • Weakness - Generalized     Dr. Medina sent patinet in for c/o generalized weakness and lethargy ongoing for 3 weeks but worsened today.         Subjective    Today the patient is awake but his speech is a little garbled and dysarthric.  He seems a little confused but he does recognize person and place and he recognized me.  He is not agitated and he has no focal neurologic signs.  Apparently last night he was worse but fortunately his head CT was unremarkable.  He had a noncontrast abdominal CT that was also done but did not show any significant pathology to explain his rapid decompensation.  Currently he is on continuous dialysis and seems to be stable.  His potassium is back to normal.  Amazingly his INR is high again at greater than 10 and he is a little thrombocytopenic as well.  His fingers and toes warmer but his skin is more mottled with more livedo-like changes.  Unfortunately it continues to remain unclear exactly what is going on.  I discussed this case again the today with Dr. Tanner.    Interval History:     Patient Complaints: none  Patient Denies: He denies pain, shortness of breath, or nausea  History taken from: patient chart RN    Review of Systems:    Review of systems could not be obtained due to  patient confusion.    Objective      Vital Signs  Temp:  [98 °F (36.7 °C)-99.2 °F (37.3 °C)] 98.9 °F (37.2 °C)  Pulse:  [] 109  Resp:  [18] 18  BP: (111-154)/(55-97) 154/97    I/O'S  Intake & Output (last 7 days)       02/10 0701 - 02/11 0700 02/11 0701 - 02/12 0700 02/12 0701 - 02/13 0700 02/13 0701 - 02/14 0700 02/14 0701 - 02/15 0700 02/15 0701 - 02/16 0700 02/16 0701 - 02/17 0700 02/17 0701 - 02/18 0700    P.O.      358 0     I.V. (mL/kg)       6347.2 (85.3)     IV Piggyback       550     Total Intake(mL/kg)       358 (5.1) 6897.2 (92.7)     Urine (mL/kg/hr)       35 (0)     Other       1598     Total Output       1633     Net      +358 +5264.2                 Urine Unmeasured Occurrence     1 x       Stool Unmeasured Occurrence     1 x             Physical Exam:   General Appearance appears stated age, fatigued, cooperative, pale and a little confused.   Lungs clear to auscultation, respirations regular, respirations even and respirations unlabored  Heart regular rhythm & normal rate, normal S1, S2, no murmur, no gallop, no rub, no click and his rate remains persistently bradycardic and even seems a little slower today.  Abdomen normal bowel sounds, no masses, no hepatomegaly, no splenomegaly, soft non-tender, no guarding and no rebound tenderness  Extremities cyanosisOf fingers and toes persists but seems less in his hands and also less cold but more mottled in general.     Results Review:     I reviewed the patient's new clinical results.  I reviewed the patient's new imaging results and agree with the interpretation.  I reviewed the patient's other test results and agree with the interpretation                     Results from last 7 days   Lab Units 02/17/21  0510 02/17/21  0032 02/16/21  1715 02/16/21  1343 02/16/21  1045 02/15/21  0545 02/14/21  1428   SODIUM mmol/L 135*  135* 131* 139 138  138 141 139 139   POTASSIUM mmol/L 4.6  4.6 4.3 6.0* 6.7*  6.7* 6.9* 4.8 4.3   CHLORIDE mmol/L 94*  94* 89* 96* 98  98 98 100 100   CO2 mmol/L 18.8*  18.8* 19.2* 10.7* 7.7*  7.7* 11.0* 18.0* 22.8   BUN mg/dL 26*  26* 36* 63* 62*  62* 62* 42* 31*   CREATININE mg/dL 2.46*  2.46* 3.55* 5.42* 5.53*  5.53* 5.41* 3.44* 2.44*   GLUCOSE mg/dL 139*  139* 215* 296* 236*  236* 39* 137* 147*   CALCIUM mg/dL 7.4*  7.4* 7.3* 8.4* 8.6  8.6 8.8 9.2 9.6     Results from last 7 days   Lab Units 02/16/21  1343   CK TOTAL U/L 1,474*     Results from last 7 days   Lab Units 02/17/21  0510 02/16/21  0402 02/15/21  0545 02/14/21  142    WBC 10*3/mm3 6.75 11.17* 7.58 8.90   HEMOGLOBIN g/dL 10.9* 12.0* 10.6* 11.6*   HEMATOCRIT % 33.6* 39.2 34.3* 37.2*   PLATELETS 10*3/mm3 115* 199 206 248     Results from last 7 days   Lab Units 02/17/21  0510 02/16/21  1902   INR  >10.00* >10.00*   APTT seconds 61.9*  --      Results from last 7 days   Lab Units 02/17/21  0510 02/17/21  0032 02/16/21  0402 02/15/21  0545 02/14/21  1428   MAGNESIUM mg/dL 2.0 1.5* 2.1 1.8 1.8     Results from last 7 days   Lab Units 02/15/21  0545   CHOLESTEROL mg/dL 67   TRIGLYCERIDES mg/dL 78   HDL CHOL mg/dL 30*   LDL CHOL mg/dL 20     Results from last 7 days   Lab Units 02/15/21  0545   PROBNP pg/mL 9,307.0*           Lab Results   Component Value Date    HGBA1C 6.04 (H) 02/15/2021    HGBA1C 6.20 (H) 02/01/2021     Glucose   Date/Time Value Ref Range Status   02/17/2021 0754 107 70 - 130 mg/dL Final   02/17/2021 0322 118 70 - 130 mg/dL Final   02/17/2021 0059 166 (H) 70 - 130 mg/dL Final   02/16/2021 1936 244 (H) 70 - 130 mg/dL Final   02/16/2021 1618 174 (H) 70 - 130 mg/dL Final   02/16/2021 1448 224 (H) 70 - 130 mg/dL Final   02/16/2021 1324 31 (C) 70 - 130 mg/dL Final   02/16/2021 1215 98 70 - 130 mg/dL Final             Medication Review:   Scheduled Meds:budesonide-formoterol, 2 puff, Inhalation, BID - RT  carbidopa-levodopa, 1 tablet, Oral, TID  cefTRIAXone, 1 g, Intravenous, Q24H  cholecalciferol, 1,000 Units, Oral, Daily  docusate sodium, 100 mg, Oral, BID  hydrocortisone sodium succinate, 100 mg, Intravenous, Q8H  metroNIDAZOLE, 500 mg, Intravenous, Q8H  sodium chloride, 3 mL, Intravenous, Q12H      Continuous Infusions:Phoxillum 4 K/2.5 CA, 1,000 mL/hr, Last Rate: 1,000 mL/hr (02/17/21 0544)  Phoxillum 4 K/2.5 CA, 1,000 mL/hr, Last Rate: 1,000 mL/hr (02/17/21 0544)  Phoxillum 4 K/2.5 CA, 1,000 mL/hr, Last Rate: 1,000 mL/hr (02/17/21 0544)  sodium bicarbonate, 150 mEq, Last Rate: 150 mEq (02/17/21 0624)      PRN Meds:.•  acetaminophen **OR** acetaminophen **OR**  acetaminophen  •  albumin human  •  calcium gluconate IVPB **OR** calcium gluconate IVPB  •  dextrose  •  heparin (porcine)  •  magnesium sulfate  •  melatonin  •  nitroglycerin  •  potassium chloride  •  sodium chloride  •  sodium chloride  •  sodium chloride  •  sodium phosphate IVPB              Patient Active Problem List   Diagnosis   • Closed right ankle fracture   • Poisoning by warfarin sodium   • Anemia   • Type 2 diabetes mellitus (CMS/HCC)   • Hypertension   • Hyperlipidemia   • Gout   • Parkinson disease (CMS/HCC)   • Anxiety   • Atrial fibrillation (CMS/HCC)   • 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  He is now in the ICU and on continuous dialysis.  He had a profound hyperkalemia yesterday that is been resolved.  Nonetheless the exact cause for this decompensation is not clear at this point.  Nephrology is concerned he may have an underlying vasculitis or some other underlying vascular disorder.  For now his diagnosis remains unclear.    2.  Bradycardia-the patient continues to be profoundly bradycardic despite being off his medications.  This is likely affecting his perfusion to some degree as well.    3.  ASCVD-is not complaining of any chest pain or other cardiac symptoms.    4.  Atrial fibrillation-he is persistent atrial fibrillation but as noted he is bradycardic off his medications.  His recent 2D echocardiogram did not show any significant changes.    5.  Diabetes mellitus type 2-he has been hypoglycemic on several occasions for now his blood sugars seem  reasonably stable    6.  Parkinson's disease-relatively stable    7.  Hypertension-well-controlled as an outpatient and still is relatively stable in the hospital    8.  Gout-controlled with low doses of allopurinol    9.  Anemia-relatively mild and stable at this point.    10.  High cholesterol-well-controlled as an outpatient    11.  Liver failure-he seems to have developed acute liver dysfunction as well.  His AST is over 1000, his bilirubin is up, his INR is high, and just 3 days ago these were normal.  His hepatitis profile was negative.  His lactate and pro calcitonin's are also elevated.  This almost looks like shock liver.    This point the patient seems somewhat stable and to be tolerating his dialysis well.  Again exactly what is causing all of this is not clear.  Fortunately he has not had an acute neurologic event and I think some of his neurologic symptoms may be more of a uremia issue as opposed to a focal neurologic change.              Plan for disposition:Where: rehab    Mal Barboza MD  02/17/21  08:18 EST          Time:

## 2021-02-17 NOTE — PROGRESS NOTES
[facility] current with PT and will continue to follow for D/C needs, add on of disciplines as ordered.  Thank you. Humaira Mckinney RN

## 2021-02-17 NOTE — PLAN OF CARE
Pt remained in the ICU tonight. Pt is AAOx2, but very confused. Pt speech is slurred and very hard to understand. See pervious note about neuro changes. Pt has remained SB 40s-50s. Pt received HD tonight and went for a CT of head/abd. See results. Pt extremities have become more mottled overnight. Bilateral hands have become more cyanotic. Pulses are palpable, but very weak. MD was notified of changes. Ordered to increase solu-cortef dosages from 50mg to 100 mg, repeat a lactic acid, and get an ABG. Pt remains on Bicarb gtt. Magnesium was replaced. Wife update over the phone. Will continue to monitor.

## 2021-02-17 NOTE — NURSING NOTE
Stroke pager called for new onset confusion, severe dysarthria, and aphagia. NIH 6. Pt history is very complicated. He just finished his first HD treatment for an sadie and severe metabolic acidosis and it has been noted in his chart a concern for showering clots. Team D imagaing ordered per Dr. Carney.

## 2021-02-17 NOTE — PLAN OF CARE
Patient remains in ICU now comfort measures, CRRT discontinued this afternoon when patient became unresponsive, family requested not to proceed and to make patient comfortable    Problem: Fall Injury Risk  Goal: Absence of Fall and Fall-Related Injury  Intervention: Identify and Manage Contributors to Fall Injury Risk  Recent Flowsheet Documentation  Taken 2/17/2021 1500 by Rita Hahn, RN  Medication Review/Management: medications reviewed  Taken 2/17/2021 1400 by Rita Hahn, RN  Medication Review/Management: medications reviewed  Taken 2/17/2021 1300 by Rita Hahn, RN  Medication Review/Management: medications reviewed  Taken 2/17/2021 0800 by Rita Hahn, RN  Medication Review/Management: medications reviewed  Intervention: Promote Injury-Free Environment  Recent Flowsheet Documentation  Taken 2/17/2021 1500 by Rita Hahn, RN  Safety Promotion/Fall Prevention:  • fall prevention program maintained  • room organization consistent  • safety round/check completed  Taken 2/17/2021 1400 by Rita Hahn, RN  Safety Promotion/Fall Prevention:  • fall prevention program maintained  • clutter free environment maintained  • assistive device/personal items within reach  • lighting adjusted  • safety round/check completed  • room organization consistent  Taken 2/17/2021 1300 by Rita Hahn, RN  Safety Promotion/Fall Prevention:  • clutter free environment maintained  • fall prevention program maintained  • room organization consistent  • safety round/check completed  • lighting adjusted  Taken 2/17/2021 1200 by Rita Hahn, RN  Safety Promotion/Fall Prevention:  • assistive device/personal items within reach  • clutter free environment maintained  • fall prevention program maintained  • lighting adjusted  Taken 2/17/2021 1100 by Rita Hahn, RN  Safety Promotion/Fall Prevention:  • clutter free environment maintained  • fall  prevention program maintained  • safety round/check completed  • lighting adjusted  Taken 2/17/2021 0900 by Rita Hahn, RN  Safety Promotion/Fall Prevention:  • clutter free environment maintained  • fall prevention program maintained  • room organization consistent  • safety round/check completed  Taken 2/17/2021 0800 by Rita Hahn, RN  Safety Promotion/Fall Prevention:  • assistive device/personal items within reach  • clutter free environment maintained  • fall prevention program maintained  • lighting adjusted  • room organization consistent  • safety round/check completed     Problem: Adult Inpatient Plan of Care  Goal: Absence of Hospital-Acquired Illness or Injury  Intervention: Identify and Manage Fall Risk  Recent Flowsheet Documentation  Taken 2/17/2021 1500 by Rita Hahn, RN  Safety Promotion/Fall Prevention:  • fall prevention program maintained  • room organization consistent  • safety round/check completed  Taken 2/17/2021 1400 by Rita Hahn, RN  Safety Promotion/Fall Prevention:  • fall prevention program maintained  • clutter free environment maintained  • assistive device/personal items within reach  • lighting adjusted  • safety round/check completed  • room organization consistent  Taken 2/17/2021 1300 by Rita Hahn, RN  Safety Promotion/Fall Prevention:  • clutter free environment maintained  • fall prevention program maintained  • room organization consistent  • safety round/check completed  • lighting adjusted  Taken 2/17/2021 1200 by Rita Hahn, RN  Safety Promotion/Fall Prevention:  • assistive device/personal items within reach  • clutter free environment maintained  • fall prevention program maintained  • lighting adjusted  Taken 2/17/2021 1100 by Rita Hahn, RN  Safety Promotion/Fall Prevention:  • clutter free environment maintained  • fall prevention program maintained  • safety round/check completed  •  lighting adjusted  Taken 2/17/2021 0900 by Rita Hahn, RN  Safety Promotion/Fall Prevention:  • clutter free environment maintained  • fall prevention program maintained  • room organization consistent  • safety round/check completed  Taken 2/17/2021 0800 by Rita Hahn, RN  Safety Promotion/Fall Prevention:  • assistive device/personal items within reach  • clutter free environment maintained  • fall prevention program maintained  • lighting adjusted  • room organization consistent  • safety round/check completed  Intervention: Prevent Skin Injury  Recent Flowsheet Documentation  Taken 2/17/2021 1400 by Rita Hahn, RN  Body Position:  • turned  • side-lying, right  Taken 2/17/2021 1200 by Rita Hahn RN  Body Position:  • turned  • side-lying, left  Taken 2/17/2021 0800 by Rita Hahn RN  Body Position:  • turned  • side-lying, left  Intervention: Prevent and Manage VTE (venous thromboembolism) Risk  Recent Flowsheet Documentation  Taken 2/17/2021 0800 by Rita Hahn RN  VTE Prevention/Management: dorsiflexion/plantar flexion performed  Intervention: Prevent Infection  Recent Flowsheet Documentation  Taken 2/17/2021 1500 by Rita Hahn RN  Infection Prevention:  • rest/sleep promoted  • single patient room provided  • visitors restricted/screened  Taken 2/17/2021 1400 by Rita Hahn RN  Infection Prevention:  • rest/sleep promoted  • personal protective equipment utilized  • environmental surveillance performed  • visitors restricted/screened  • single patient room provided  Taken 2/17/2021 1300 by Rita Hahn, RN  Infection Prevention:  • equipment surfaces disinfected  • personal protective equipment utilized  • rest/sleep promoted  • single patient room provided  Taken 2/17/2021 1200 by Rita Hahn RN  Infection Prevention:  • environmental surveillance performed  • rest/sleep promoted  • single  patient room provided  Taken 2/17/2021 1100 by Rita Hahn RN  Infection Prevention:  • environmental surveillance performed  • personal protective equipment utilized  • rest/sleep promoted  Taken 2/17/2021 0900 by Rita Hahn RN  Infection Prevention:  • personal protective equipment utilized  • environmental surveillance performed  • single patient room provided  • rest/sleep promoted  • visitors restricted/screened  Taken 2/17/2021 0800 by Rita Hahn RN  Infection Prevention:  • environmental surveillance performed  • personal protective equipment utilized  • single patient room provided  • visitors restricted/screened  Goal: Optimal Comfort and Wellbeing  Intervention: Provide Person-Centered Care  Recent Flowsheet Documentation  Taken 2/17/2021 1200 by Rita Hahn RN  Trust Relationship/Rapport:  • care explained  • choices provided  • questions encouraged  • reassurance provided  Taken 2/17/2021 0800 by Rita Hahn RN  Trust Relationship/Rapport:  • choices provided  • care explained  • thoughts/feelings acknowledged  • reassurance provided     Problem: Skin Injury Risk Increased  Goal: Skin Health and Integrity  Intervention: Optimize Skin Protection  Recent Flowsheet Documentation  Taken 2/17/2021 1400 by Rita Hahn, RN  Head of Bed (HOB): HOB at 20-30 degrees  Taken 2/17/2021 1200 by Rita Hahn RN  Pressure Reduction Techniques:  • frequent weight shift encouraged  • heels elevated off bed  • pressure points protected  • weight shift assistance provided  Head of Bed (HOB): HOB at 20-30 degrees  Pressure Reduction Devices:  • positioning supports utilized  • pressure-redistributing mattress utilized  • specialty bed utilized  Taken 2/17/2021 0800 by Rita Hahn, RN  Pressure Reduction Techniques:  • frequent weight shift encouraged  • heels elevated off bed  • pressure points protected  • weight shift assistance  provided  Head of Bed (HOB): HOB at 30-45 degrees  Pressure Reduction Devices: specialty bed utilized   Goal Outcome Evaluation:

## 2021-02-18 NOTE — CONSULTS
This was a code blue. The patient was in coma and his wife was present at the time of visit. Wife could share about her current feelings and about her .emotional and spiritual support provided through listening and prayer. Thanks offered and  remains available.

## 2021-02-18 NOTE — DISCHARGE SUMMARY
Date of Discharge:  2/18/2021    Discharge Diagnosis:   #1 Acute Renal Failure-precipitous decline in renal function with no clear cause.  2.  Acute liver failure-also precipitous with no clear cause  3.  Possible vasculitis as a cause of #1 and #2, laboratory studies still pending  4.  Acute respiratory failure-the patient became hypoxic likely related to #1 and #2  5.  Bradycardia-secondary to medications  6.  Atrial fibrillation-chronic but with a stable rate  7.  Diabetes mellitus type 2-chronic and stable  8.  Hypertension-well-controlled and not hypotensive during the majority of this episode  9.  High cholesterol-well-controlled on medications  10.  Gout-well-controlled with low-dose allopurinol  11.  ASCVD-no evidence for active cardiac disease per cardiology's evaluation  12.  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  Possible sepsis-no clear etiology or source  14.  Encephalopathy-likely metabolic related to his multiple organ failure                  DETAILS OF HOSPITAL STAY     Presenting Problem/History of Present Illness  Bradycardia [R00.1]  Renal insufficiency [N28.9]  Elevated troponin [R77.8]  Generalized weakness [R53.1]    Elevated troponin    Anemia    Type 2 diabetes mellitus (CMS/HCC)    Hypertension    Hyperlipidemia    Gout    Parkinson disease (CMS/HCC)    Anxiety    Atrial fibrillation (CMS/HCC)    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)    Acute kidney injury superimposed on chronic kidney disease (CMS/HCC)    Generalized weakness    Ataxia    Volume depletion    Bradycardia    Cardiomegaly    Coagulopathy (CMS/HCC)    Thrombocytopenia (CMS/HCC)    Anemia    Past Medical History:   Diagnosis Date   • Ankle fracture     RIGHT ANKLE    • Anxiety     ABOUT CURRENT HEALTH SITUATION    • Atrial fibrillation (CMS/HCC)    • Constipation due to opioid therapy     NO BOWEL MOVEMENT IN 5 DAYS   • Coronary artery disease    •  Diabetes mellitus (CMS/HCC)    • Gout     BIG TOES   • Hyperlipidemia    • Hypertension    • Iron deficiency anemia    • Parkinson disease (CMS/HCC)    • Supratherapeutic INR      Past Surgical History:   Procedure Laterality Date   • ANKLE OPEN REDUCTION INTERNAL FIXATION Right 11/12/2019    Procedure: OPEN REDUCTION INTERNAL FIXATION RIGHT ANKLE;  Surgeon: Evgeny Keith II, MD;  Location: [facility] MAIN OR;  Service: Orthopedics   • CATARACT EXTRACTION EXTRACAPSULAR W/ INTRAOCULAR LENS IMPLANTATION Bilateral    • COLONOSCOPY  2011   • CORONARY ANGIOPLASTY WITH STENT PLACEMENT  1998   • ENDOSCOPY  1/31/2021    Procedure: ESOPHAGOGASTRODUODENOSCOPY AT BEDSIDE;  Surgeon: Geoff Rosenthal MD;  Location: [facility] ENDOSCOPY;  Service: Gastroenterology;;  PRE OP - BLACK STOOLS  POST OP - CANDIDA   • EXTRACORPOREAL SHOCK WAVE LITHOTRIPSY (ESWL)      OVER  10 YEARS AGO   • PILONIDAL CYSTECTOMY  1964       Allergies  Patient has no known allergies.    Discharge Medications     Discharge Medications      ASK your doctor about these medications      Instructions Start Date   allopurinol 100 MG tablet  Commonly known as: ZYLOPRIM   300 mg, Oral, Daily      amantadine 100 MG capsule  Commonly known as: SYMMETREL   100 mg, Oral, 2 Times Daily      carbidopa-levodopa  MG per tablet  Commonly known as: SINEMET   1 tablet, Oral, 3 Times Daily      cholecalciferol 25 MCG (1000 UT) tablet  Commonly known as: VITAMIN D3   2,000 Units, Oral, Daily      Eliquis 5 MG tablet tablet  Generic drug: apixaban   5 mg, Oral, Every 12 Hours Scheduled      ferrous sulfate 325 (65 FE) MG EC tablet   325 mg, Oral, Daily With Breakfast      fluticasone-salmeterol 100-50 MCG/DOSE DISKUS  Commonly known as: ADVAIR   Inhalation, 2 Times Daily - RT      furosemide 20 MG tablet  Commonly known as: Lasix   20 mg, Oral, Daily      metoprolol tartrate 50 MG tablet  Commonly known as: LOPRESSOR   75 mg, Oral, Every 12 Hours Scheduled       montelukast 10 MG tablet  Commonly known as: SINGULAIR   10 mg, Oral, Nightly      pioglitazone 15 MG tablet  Commonly known as: ACTOS   15 mg, Oral, Daily      pravastatin 40 MG tablet  Commonly known as: PRAVACHOL   40 mg, Oral, Daily      Zetia 10 MG tablet  Generic drug: ezetimibe   10 mg, Oral, Daily             Hospital Course  Patient is a 80 y.o. male presented with symptoms primarily relating to severe weakness, difficulty ambulating, slow heart rate, and cough.  Apparently the symptoms have been getting worse over the last several days prior to admission.  He had some vague symptoms of shortness of breath but was not complaining of chest pain, abdominal pain, or any other specific discomfort.  In the emergency room his O2 saturations were noted to be 95%, his blood pressure 101/60, his respiratory rate was 16, his heart rate was 48, and his temperature was 96.9.  Chest x-ray was unremarkable and EKG just showed the bradycardia.  At this point the patient was admitted primarily because he was too weak to manage at home any longer.  It was unclear exactly what the underlying issue was but he clearly could go home safely.  He may have also been a little dehydrated since he was having some mild anorexia as well and his BUN and creatinine seem to be elevated.    The patient almost immediately began showing signs of rapid decline in his renal function.  By the next day his GFR had dropped to 17 when his baseline was closer to 57.  Renal ultrasound did not show any evidence for obstructive uropathy.  His laboratory studies suggested initially that he may be dry but he was never hypotensive.  His bradycardia persisted which was thought to be secondary to medications and they were stopped.  He did not look particularly toxic at this point just very tired.  His other vital signs remained relatively stable and his labs were not particularly helpful.  He has been seen and evaluated by nephrology and cardiology at  "this point.  Obviously the initial goals were to stabilize the patient is much as possible.    By the next day the patient clearly was fatigued and tired but now he actually looked ill and toxic.  Instead of calling his symptoms tiredness he is indicated that he felt \" bad\" in general.  Unfortunately he did not have any specific complaints to help guide the diagnosis.  His skin was mottled, he had a gross cyanosis of his hands and feet, both hands and feet were very cold, and his laboratory function showed a precipitous decline in his liver functions as well.  Because of the rapid decline in his overall status in particular because of his renal failure nephrology suggested the patient be transferred to ICU where he was put on continuous dialysis.  In the intensive care unit he was put on antibiotics to cover for sepsis although there was no clear source for infection, the dialysis was initiated to address his renal failure but also the lactic acidosis.  Nonetheless he continued a rapid and precipitous decline.    By the next day he had continued to decline even further.  He showed no evidence that he was responding to any treatments.  Dialysis, steroids, and antibiotics seem to make no difference.  He continues to decline throughout the day until late in the afternoon he appeared to develop respiratory failure and and subsequently cardiac arrest.  The family elected not to attempt resuscitation.  The patient  in the ICU.                Procedures Performed     171 Non-Tunneled Catheter    Consults:   Consults     Date and Time Order Name Status Description    2021 0157 Inpatient Neurology Consult General (neuro changes) Completed     2021 1547 Inpatient Vascular Surgery Consult Completed     2021 Inpatient Nephrology Consult Completed     2021 Inpatient Cardiology Consult Completed     2021 1541 Family Medicine Consult Completed     2/3/2021 0751 Inpatient ENT Consult " "Completed     2021 0154 Inpatient Cardiology Consult Completed     2021 0845 Pulmonology (on-call MD unless specified) Completed     2021 0845 Gastroenterology (on-call MD unless specified) Completed     2021 1412 Family Medicine Consult Completed           Pertinent Test Results:   Blood sugar 85, BUN 15, creatinine 1.57, sodium 138, chloride 99, CO2 19.9, GFR 43 (on continuous dialysis in the ICU)  ABG pH 7.2, CO2 36, O2 96, on 2 L by nasal cannula  INR greater than 10  PT 62.5,  Ammonia 32  Blood culture no growth at 24 hours  Lactate 10.2, procalcitonin 0.53  WBC 6.7, hemoglobin 10.9, hematocrit 33.6, platelets 115  (Predialysis chemistries) blood sugar 215, BUN 36, creatinine 3.55, sodium 131, CO2 19, EGFR 17  Hepatitis profile A, B, C, negative  C-ANCA, P-ANCA (both less than 1:20)  Anti-DNA less than 1  TSH 4.3  Iron 33, iron saturation 9, transferrin 235, TIBC 350, ferritin 205  Uric acid 6.6  Cholesterol 67, triglycerides 78, HDL 30, LDL 20      Condition on Discharge:    The patient was in the ICU and critically ill and continued decline despite all measures to reverse his illness.  When he became more unstable and less responsive he eventually went into respiratory and cardiac failure.  At this point the family elected to not have any other intervention and the patient  in the ICU.  A clear diagnosis has not yet been established.  At this point there are number of outstanding laboratory studies that remain to be evaluated.              Vital Signs  Temp:  [96.5 °F (35.8 °C)-96.7 °F (35.9 °C)] 96.5 °F (35.8 °C)  Heart Rate:  [45-53] 45  Resp:  [30-60] 60  BP: ()/(58-93) 70/60    Flowsheet Rows      First Filed Value   Admission Height  172.7 cm (68\") Documented at 2021 1318   Admission Weight  72.6 kg (160 lb) Documented at 2021 1318              Discharge Disposition   in Medical Facility        Discharge Diet:     Activity at Discharge:     Follow-up " Appointments  No future appointments.      Test Results Pending at Discharge  Pending Labs     Order Current Status    Cryoglobulin In process    Lupus Anticoag / Cardiolipin Ab In process    Blood Culture - Blood, Arm, Right Preliminary result    Blood Culture - Blood, Blood, Central Line Preliminary result           Mal Barboza MD  02/18/21  13:58 EST

## 2021-02-18 NOTE — PROGRESS NOTES
Case Management Discharge Note                Selected Continued Care - Discharged on 2021 Admission date: 2021 - Discharge disposition:  in Medical Facility    Destination    No services have been selected for the patient.              Durable Medical Equipment    No services have been selected for the patient.              Dialysis/Infusion    No services have been selected for the patient.              Home Medical Care    No services have been selected for the patient.              Therapy    No services have been selected for the patient.              Community Resources    No services have been selected for the patient.                Selected Continued Care - Prior Encounters Includes selections from prior encounters from 2020 to 2021    Discharged on 2021 Admission date: 2021 - Discharge disposition: Home or Self Care    Home Medical Care     Service Provider Selected Services Address Phone Fax Patient Preferred    [facility] [address] [address] [phone] --                         Final Discharge Disposition Code: [id number] -  in medical facility

## 2021-02-22 LAB
BACTERIA SPEC AEROBE CULT: NORMAL
BACTERIA SPEC AEROBE CULT: NORMAL
CRYOGLOB SER QL 1D COLD INC: NORMAL

## 2021-03-10 LAB
APTT HEX PL PPP: 0 SEC
APTT IMM NP PPP: 27.8 SEC
APTT PPP 1:1 SALINE: 83.6 SEC
APTT PPP: 49.2 SEC
B2 GLYCOPROT1 IGA SER-ACNC: 11 SAU
B2 GLYCOPROT1 IGG SER-ACNC: <10 SGU
B2 GLYCOPROT1 IGM SER-ACNC: <10 SMU
CARDIOLIPIN IGG SER IA-ACNC: <10 GPL
CARDIOLIPIN IGM SER IA-ACNC: <10 MPL
CONFIRM DRVVT: 46.9 SEC
DRVVT SCREEN TO CONFIRM RATIO: 0.9 RATIO
INR PPP: >10 RATIO
LABORATORY COMMENT REPORT: ABNORMAL
PROTHROMBIN TIME: >113.6 SEC
SCREEN DRVVT: 47.9 SEC
THROMBIN TIME: 18.6 SEC

## 2021-12-03 NOTE — PERIOPERATIVE NURSING NOTE
Waiting on physical therapy to come and teach patient how to walk with crutches.   Fluconazole Counseling:  Patient counseled regarding adverse effects of fluconazole including but not limited to headache, diarrhea, nausea, upset stomach, liver function test abnormalities, taste disturbance, and stomach pain.  There is a rare possibility of liver failure that can occur when taking fluconazole.  The patient understands that monitoring of LFTs and kidney function test may be required, especially at baseline. The patient verbalized understanding of the proper use and possible adverse effects of fluconazole.  All of the patient's questions and concerns were addressed.

## 2022-02-11 NOTE — PLAN OF CARE
151 Goal Outcome Evaluation:  Plan of Care Reviewed With: patient  Progress: improving  Outcome Summary: VSS. No nose bleed overnight. Patient is to see ENT today. Possibly start Eliquis after visit with ENT. Labs for the morning.      Problem: Fall Injury Risk  Goal: Absence of Fall and Fall-Related Injury  Outcome: Ongoing, Progressing  Intervention: Identify and Manage Contributors to Fall Injury Risk  Recent Flowsheet Documentation  Taken 2/2/2021 2053 by Johana Jorgensen RN  Medication Review/Management: medications reviewed  Intervention: Promote Injury-Free Environment  Recent Flowsheet Documentation  Taken 2/3/2021 0416 by Johana Jorgensen RN  Safety Promotion/Fall Prevention:   activity supervised   assistive device/personal items within reach   clutter free environment maintained   fall prevention program maintained   nonskid shoes/slippers when out of bed   room organization consistent   safety round/check completed  Taken 2/3/2021 0226 by Johana Jorgensen RN  Safety Promotion/Fall Prevention:   clutter free environment maintained   activity supervised   assistive device/personal items within reach   fall prevention program maintained   nonskid shoes/slippers when out of bed   room organization consistent   safety round/check completed  Taken 2/3/2021 0026 by Johana Jorgensen RN  Safety Promotion/Fall Prevention:   activity supervised   assistive device/personal items within reach   clutter free environment maintained   fall prevention program maintained   nonskid shoes/slippers when out of bed   room organization consistent   safety round/check completed  Taken 2/2/2021 2201 by Johana Jorgensen RN  Safety Promotion/Fall Prevention:   activity supervised   assistive device/personal items within reach   clutter free environment maintained   fall prevention program maintained   nonskid shoes/slippers when out of bed   room organization consistent   safety round/check completed  Taken 2/2/2021 2053 by Johana Jorgensen RN  Safety  Promotion/Fall Prevention:   activity supervised   assistive device/personal items within reach   fall prevention program maintained   clutter free environment maintained   nonskid shoes/slippers when out of bed   room organization consistent   safety round/check completed     Problem: Skin Injury Risk Increased  Goal: Skin Health and Integrity  Outcome: Ongoing, Progressing  Intervention: Optimize Skin Protection  Recent Flowsheet Documentation  Taken 2/3/2021 0416 by Johana Jorgensen RN  Head of Bed (HOB): HOB at 20-30 degrees  Taken 2/3/2021 0226 by Johana Jorgensen RN  Head of Bed (HOB): HOB at 15 degrees  Taken 2/3/2021 0026 by Johana Jorgensen RN  Pressure Reduction Techniques: frequent weight shift encouraged  Head of Bed (HOB): HOB at 15 degrees  Taken 2/2/2021 2201 by Johana Jorgensen RN  Head of Bed (HOB): HOB at 20 degrees  Taken 2/2/2021 2053 by Johana Jorgensen RN  Pressure Reduction Techniques: frequent weight shift encouraged  Head of Bed (HOB): HOB at 15 degrees     Problem: Bleeding (Gastrointestinal Bleeding)  Goal: Hemostasis  Outcome: Ongoing, Progressing

## (undated) DEVICE — SOL ISO/ALC RUB 70PCT 4OZ

## (undated) DEVICE — ADAPT CLN BIOGUARD AIR/H2O DISP

## (undated) DEVICE — UNDERCAST PADDING: Brand: DEROYAL

## (undated) DEVICE — STPLR SKIN VISISTAT WD 35CT

## (undated) DEVICE — SUT VIC 2/0 CT2 27IN J269H

## (undated) DEVICE — DISPOSABLE TOURNIQUET CUFF SINGLE BLADDER, SINGLE PORT AND QUICK CONNECT CONNECTOR: Brand: COLOR CUFF

## (undated) DEVICE — DRP C/ARM 41X74IN

## (undated) DEVICE — BOOT WALK INTEGRITY FX TALL MD

## (undated) DEVICE — GLV SURG SENSICARE PI PF LF 8.5 GRN STRL

## (undated) DEVICE — CANN O2 ETCO2 FITS ALL CONN CO2 SMPL A/ 7IN DISP LF

## (undated) DEVICE — DRP C/ARMOR

## (undated) DEVICE — KT ORCA ORCAPOD DISP STRL

## (undated) DEVICE — DRSNG ADAPTIC 3X8

## (undated) DEVICE — SUT VIC 0 CT1 36IN J946H

## (undated) DEVICE — SPNG GZ WOVN 4X4IN 12PLY 10/BX STRL

## (undated) DEVICE — LN SMPL CO2 SHTRM SD STREAM W/M LUER

## (undated) DEVICE — DRSNG WND GZ CURAD OIL EMULSION 3X8IN LF STRL 1PK

## (undated) DEVICE — BRUSH CYTO COLONOSCOPE 7F3MM 240CM RED

## (undated) DEVICE — GLV SURG PREMIERPRO ORTHO LTX PF SZ8.5 BRN

## (undated) DEVICE — EVOS 3.5MM X 14MM CORTEX SCREW SELF-TAPPING
Type: IMPLANTABLE DEVICE | Status: NON-FUNCTIONAL
Brand: EVOS
Removed: 2019-11-12

## (undated) DEVICE — EVOS SMALL 2.5MM DRILL W/AO QC SHORT: Brand: EVOS

## (undated) DEVICE — BNDG ELAS ELITE V/CLOSE 6IN 5YD LF STRL

## (undated) DEVICE — OCCLUSIVE GAUZE STRIP OVERWRAP,3% BISMUTH TRIBROMOPHENATE IN PETROLATUM BLEND: Brand: XEROFORM

## (undated) DEVICE — GOWN,PREVENTION PLUS,XLONG/XLARGE,STRL: Brand: MEDLINE

## (undated) DEVICE — SENSR O2 OXIMAX FNGR A/ 18IN NONSTR

## (undated) DEVICE — EVOS SMALL 2.5MM DRILL W/AO QC LONG: Brand: EVOS

## (undated) DEVICE — DRAPE,U/ SHT,SPLIT,PLAS,STERIL: Brand: MEDLINE

## (undated) DEVICE — EVOS SMALL 3.5MM OVER DRILL W/AO                                    QC SHORT: Brand: EVOS

## (undated) DEVICE — TUBING, SUCTION, 1/4" X 10', STRAIGHT: Brand: MEDLINE

## (undated) DEVICE — BITEBLOCK OMNI BLOC

## (undated) DEVICE — APPL CHLORAPREP W/TINT 26ML ORNG

## (undated) DEVICE — BNDG ELAS CO-FLEX SLF ADHR 4IN5YD LF STRL

## (undated) DEVICE — PK ORTHO MINOR 40